# Patient Record
Sex: FEMALE | Race: WHITE | NOT HISPANIC OR LATINO | URBAN - METROPOLITAN AREA
[De-identification: names, ages, dates, MRNs, and addresses within clinical notes are randomized per-mention and may not be internally consistent; named-entity substitution may affect disease eponyms.]

---

## 2021-11-17 ENCOUNTER — INPATIENT (INPATIENT)
Facility: HOSPITAL | Age: 86
LOS: 4 days | Discharge: HOME | DRG: 308 | End: 2021-11-22
Attending: INTERNAL MEDICINE | Admitting: INTERNAL MEDICINE
Payer: MEDICARE

## 2021-11-17 VITALS
HEIGHT: 58 IN | RESPIRATION RATE: 18 BRPM | SYSTOLIC BLOOD PRESSURE: 128 MMHG | OXYGEN SATURATION: 96 % | TEMPERATURE: 99 F | DIASTOLIC BLOOD PRESSURE: 78 MMHG | WEIGHT: 110.01 LBS | HEART RATE: 127 BPM

## 2021-11-17 DIAGNOSIS — I10 ESSENTIAL (PRIMARY) HYPERTENSION: ICD-10-CM

## 2021-11-17 DIAGNOSIS — I50.23 ACUTE ON CHRONIC SYSTOLIC (CONGESTIVE) HEART FAILURE: ICD-10-CM

## 2021-11-17 DIAGNOSIS — K21.9 GASTRO-ESOPHAGEAL REFLUX DISEASE WITHOUT ESOPHAGITIS: ICD-10-CM

## 2021-11-17 DIAGNOSIS — F03.90 UNSPECIFIED DEMENTIA WITHOUT BEHAVIORAL DISTURBANCE: ICD-10-CM

## 2021-11-17 DIAGNOSIS — M19.90 UNSPECIFIED OSTEOARTHRITIS, UNSPECIFIED SITE: ICD-10-CM

## 2021-11-17 DIAGNOSIS — I50.9 HEART FAILURE, UNSPECIFIED: ICD-10-CM

## 2021-11-17 DIAGNOSIS — D50.9 IRON DEFICIENCY ANEMIA, UNSPECIFIED: ICD-10-CM

## 2021-11-17 DIAGNOSIS — I48.91 UNSPECIFIED ATRIAL FIBRILLATION: ICD-10-CM

## 2021-11-17 DIAGNOSIS — Z29.9 ENCOUNTER FOR PROPHYLACTIC MEASURES, UNSPECIFIED: ICD-10-CM

## 2021-11-17 DIAGNOSIS — Z90.49 ACQUIRED ABSENCE OF OTHER SPECIFIED PARTS OF DIGESTIVE TRACT: Chronic | ICD-10-CM

## 2021-11-17 LAB
ALBUMIN SERPL ELPH-MCNC: 3 G/DL — LOW (ref 3.3–5)
ALP SERPL-CCNC: 147 U/L — HIGH (ref 40–120)
ALT FLD-CCNC: 58 U/L — SIGNIFICANT CHANGE UP (ref 12–78)
ANION GAP SERPL CALC-SCNC: 12 MMOL/L — SIGNIFICANT CHANGE UP (ref 5–17)
APTT BLD: 31 SEC — SIGNIFICANT CHANGE UP (ref 27.5–35.5)
AST SERPL-CCNC: 24 U/L — SIGNIFICANT CHANGE UP (ref 15–37)
BASOPHILS # BLD AUTO: 0.02 K/UL — SIGNIFICANT CHANGE UP (ref 0–0.2)
BASOPHILS NFR BLD AUTO: 0.3 % — SIGNIFICANT CHANGE UP (ref 0–2)
BILIRUB SERPL-MCNC: 0.5 MG/DL — SIGNIFICANT CHANGE UP (ref 0.2–1.2)
BUN SERPL-MCNC: 25 MG/DL — HIGH (ref 7–23)
CALCIUM SERPL-MCNC: 8.8 MG/DL — SIGNIFICANT CHANGE UP (ref 8.5–10.1)
CHLORIDE SERPL-SCNC: 112 MMOL/L — HIGH (ref 96–108)
CK MB CFR SERPL CALC: 1.6 NG/ML — SIGNIFICANT CHANGE UP (ref 0–3.6)
CO2 SERPL-SCNC: 21 MMOL/L — LOW (ref 22–31)
CREAT SERPL-MCNC: 0.96 MG/DL — SIGNIFICANT CHANGE UP (ref 0.5–1.3)
EOSINOPHIL # BLD AUTO: 0.03 K/UL — SIGNIFICANT CHANGE UP (ref 0–0.5)
EOSINOPHIL NFR BLD AUTO: 0.4 % — SIGNIFICANT CHANGE UP (ref 0–6)
GLUCOSE SERPL-MCNC: 98 MG/DL — SIGNIFICANT CHANGE UP (ref 70–99)
HCT VFR BLD CALC: 36.5 % — SIGNIFICANT CHANGE UP (ref 34.5–45)
HGB BLD-MCNC: 12 G/DL — SIGNIFICANT CHANGE UP (ref 11.5–15.5)
IMM GRANULOCYTES NFR BLD AUTO: 0.3 % — SIGNIFICANT CHANGE UP (ref 0–1.5)
INR BLD: 1.13 RATIO — SIGNIFICANT CHANGE UP (ref 0.88–1.16)
LYMPHOCYTES # BLD AUTO: 1.34 K/UL — SIGNIFICANT CHANGE UP (ref 1–3.3)
LYMPHOCYTES # BLD AUTO: 17 % — SIGNIFICANT CHANGE UP (ref 13–44)
MCHC RBC-ENTMCNC: 27.6 PG — SIGNIFICANT CHANGE UP (ref 27–34)
MCHC RBC-ENTMCNC: 32.9 GM/DL — SIGNIFICANT CHANGE UP (ref 32–36)
MCV RBC AUTO: 84.1 FL — SIGNIFICANT CHANGE UP (ref 80–100)
MONOCYTES # BLD AUTO: 0.64 K/UL — SIGNIFICANT CHANGE UP (ref 0–0.9)
MONOCYTES NFR BLD AUTO: 8.1 % — SIGNIFICANT CHANGE UP (ref 2–14)
NEUTROPHILS # BLD AUTO: 5.82 K/UL — SIGNIFICANT CHANGE UP (ref 1.8–7.4)
NEUTROPHILS NFR BLD AUTO: 73.9 % — SIGNIFICANT CHANGE UP (ref 43–77)
NRBC # BLD: 0 /100 WBCS — SIGNIFICANT CHANGE UP (ref 0–0)
NT-PROBNP SERPL-SCNC: 4782 PG/ML — HIGH (ref 0–450)
PLATELET # BLD AUTO: 212 K/UL — SIGNIFICANT CHANGE UP (ref 150–400)
POTASSIUM SERPL-MCNC: 4.1 MMOL/L — SIGNIFICANT CHANGE UP (ref 3.5–5.3)
POTASSIUM SERPL-SCNC: 4.1 MMOL/L — SIGNIFICANT CHANGE UP (ref 3.5–5.3)
PROT SERPL-MCNC: 6.3 G/DL — SIGNIFICANT CHANGE UP (ref 6–8.3)
PROTHROM AB SERPL-ACNC: 13.1 SEC — SIGNIFICANT CHANGE UP (ref 10.6–13.6)
RBC # BLD: 4.34 M/UL — SIGNIFICANT CHANGE UP (ref 3.8–5.2)
RBC # FLD: 17.3 % — HIGH (ref 10.3–14.5)
SARS-COV-2 RNA SPEC QL NAA+PROBE: SIGNIFICANT CHANGE UP
SODIUM SERPL-SCNC: 145 MMOL/L — SIGNIFICANT CHANGE UP (ref 135–145)
TROPONIN I, HIGH SENSITIVITY RESULT: 20.2 NG/L — SIGNIFICANT CHANGE UP
WBC # BLD: 7.87 K/UL — SIGNIFICANT CHANGE UP (ref 3.8–10.5)
WBC # FLD AUTO: 7.87 K/UL — SIGNIFICANT CHANGE UP (ref 3.8–10.5)

## 2021-11-17 PROCEDURE — 71045 X-RAY EXAM CHEST 1 VIEW: CPT | Mod: 26

## 2021-11-17 PROCEDURE — 99285 EMERGENCY DEPT VISIT HI MDM: CPT

## 2021-11-17 PROCEDURE — 93010 ELECTROCARDIOGRAM REPORT: CPT | Mod: 77

## 2021-11-17 PROCEDURE — 99222 1ST HOSP IP/OBS MODERATE 55: CPT

## 2021-11-17 PROCEDURE — 93010 ELECTROCARDIOGRAM REPORT: CPT

## 2021-11-17 RX ORDER — METOPROLOL TARTRATE 50 MG
50 TABLET ORAL
Refills: 0 | Status: DISCONTINUED | OUTPATIENT
Start: 2021-11-17 | End: 2021-11-17

## 2021-11-17 RX ORDER — FUROSEMIDE 40 MG
40 TABLET ORAL ONCE
Refills: 0 | Status: COMPLETED | OUTPATIENT
Start: 2021-11-17 | End: 2021-11-17

## 2021-11-17 RX ORDER — FUROSEMIDE 40 MG
40 TABLET ORAL EVERY 12 HOURS
Refills: 0 | Status: DISCONTINUED | OUTPATIENT
Start: 2021-11-17 | End: 2021-11-19

## 2021-11-17 RX ORDER — RIVAROXABAN 15 MG-20MG
15 KIT ORAL DAILY
Refills: 0 | Status: DISCONTINUED | OUTPATIENT
Start: 2021-11-17 | End: 2021-11-22

## 2021-11-17 RX ORDER — METOPROLOL TARTRATE 50 MG
50 TABLET ORAL ONCE
Refills: 0 | Status: COMPLETED | OUTPATIENT
Start: 2021-11-17 | End: 2021-11-17

## 2021-11-17 RX ORDER — ERGOCALCIFEROL 1.25 MG/1
50000 CAPSULE ORAL
Refills: 0 | Status: DISCONTINUED | OUTPATIENT
Start: 2021-11-17 | End: 2021-11-22

## 2021-11-17 RX ORDER — DILTIAZEM HCL 120 MG
10 CAPSULE, EXT RELEASE 24 HR ORAL ONCE
Refills: 0 | Status: COMPLETED | OUTPATIENT
Start: 2021-11-17 | End: 2021-11-17

## 2021-11-17 RX ORDER — ACETAMINOPHEN 500 MG
650 TABLET ORAL EVERY 6 HOURS
Refills: 0 | Status: DISCONTINUED | OUTPATIENT
Start: 2021-11-17 | End: 2021-11-22

## 2021-11-17 RX ORDER — PANTOPRAZOLE SODIUM 20 MG/1
20 TABLET, DELAYED RELEASE ORAL DAILY
Refills: 0 | Status: DISCONTINUED | OUTPATIENT
Start: 2021-11-17 | End: 2021-11-22

## 2021-11-17 RX ORDER — FERROUS SULFATE 325(65) MG
325 TABLET ORAL DAILY
Refills: 0 | Status: DISCONTINUED | OUTPATIENT
Start: 2021-11-17 | End: 2021-11-22

## 2021-11-17 RX ORDER — METOPROLOL TARTRATE 50 MG
5 TABLET ORAL ONCE
Refills: 0 | Status: COMPLETED | OUTPATIENT
Start: 2021-11-17 | End: 2021-11-17

## 2021-11-17 RX ORDER — MEMANTINE HYDROCHLORIDE 10 MG/1
10 TABLET ORAL
Refills: 0 | Status: DISCONTINUED | OUTPATIENT
Start: 2021-11-17 | End: 2021-11-22

## 2021-11-17 RX ORDER — CHOLECALCIFEROL (VITAMIN D3) 125 MCG
1250 CAPSULE ORAL
Refills: 0 | Status: DISCONTINUED | OUTPATIENT
Start: 2021-11-17 | End: 2021-11-17

## 2021-11-17 RX ORDER — ZOLPIDEM TARTRATE 10 MG/1
5 TABLET ORAL AT BEDTIME
Refills: 0 | Status: DISCONTINUED | OUTPATIENT
Start: 2021-11-17 | End: 2021-11-18

## 2021-11-17 RX ORDER — FUROSEMIDE 40 MG
40 TABLET ORAL DAILY
Refills: 0 | Status: DISCONTINUED | OUTPATIENT
Start: 2021-11-17 | End: 2021-11-17

## 2021-11-17 RX ORDER — INFLUENZA VIRUS VACCINE 15; 15; 15; 15 UG/.5ML; UG/.5ML; UG/.5ML; UG/.5ML
0.7 SUSPENSION INTRAMUSCULAR ONCE
Refills: 0 | Status: COMPLETED | OUTPATIENT
Start: 2021-11-17 | End: 2021-11-22

## 2021-11-17 RX ORDER — LANOLIN ALCOHOL/MO/W.PET/CERES
3 CREAM (GRAM) TOPICAL AT BEDTIME
Refills: 0 | Status: DISCONTINUED | OUTPATIENT
Start: 2021-11-17 | End: 2021-11-17

## 2021-11-17 RX ORDER — MEMANTINE HYDROCHLORIDE 10 MG/1
28 TABLET ORAL DAILY
Refills: 0 | Status: DISCONTINUED | OUTPATIENT
Start: 2021-11-17 | End: 2021-11-17

## 2021-11-17 RX ORDER — METOPROLOL TARTRATE 50 MG
50 TABLET ORAL EVERY 8 HOURS
Refills: 0 | Status: DISCONTINUED | OUTPATIENT
Start: 2021-11-17 | End: 2021-11-22

## 2021-11-17 RX ORDER — DILTIAZEM HCL 120 MG
10 CAPSULE, EXT RELEASE 24 HR ORAL
Qty: 125 | Refills: 0 | Status: DISCONTINUED | OUTPATIENT
Start: 2021-11-17 | End: 2021-11-17

## 2021-11-17 RX ORDER — DILTIAZEM HCL 120 MG
5 CAPSULE, EXT RELEASE 24 HR ORAL
Qty: 125 | Refills: 0 | Status: DISCONTINUED | OUTPATIENT
Start: 2021-11-17 | End: 2021-11-17

## 2021-11-17 RX ORDER — METOPROLOL TARTRATE 50 MG
50 TABLET ORAL EVERY 8 HOURS
Refills: 0 | Status: DISCONTINUED | OUTPATIENT
Start: 2021-11-17 | End: 2021-11-17

## 2021-11-17 RX ADMIN — Medication 50 MILLIGRAM(S): at 18:23

## 2021-11-17 RX ADMIN — Medication 10 MILLIGRAM(S): at 20:22

## 2021-11-17 RX ADMIN — MEMANTINE HYDROCHLORIDE 10 MILLIGRAM(S): 10 TABLET ORAL at 18:24

## 2021-11-17 RX ADMIN — Medication 50 MILLIGRAM(S): at 23:09

## 2021-11-17 RX ADMIN — Medication 10 MG/HR: at 19:40

## 2021-11-17 RX ADMIN — Medication 40 MILLIGRAM(S): at 18:24

## 2021-11-17 RX ADMIN — Medication 40 MILLIGRAM(S): at 11:55

## 2021-11-17 RX ADMIN — Medication 5 MILLIGRAM(S): at 13:53

## 2021-11-17 NOTE — H&P ADULT - NEUROLOGICAL DETAILS
alert and oriented x 3/responds to verbal commands/sensation intact/normal strength alert and oriented x 3/responds to verbal commands/sensation intact/cranial nerves intact/normal strength

## 2021-11-17 NOTE — H&P ADULT - NSICDXPASTMEDICALHX_GEN_ALL_CORE_FT
PAST MEDICAL HISTORY:  CHF, chronic     Chronic atrial fibrillation     Dementia     GERD (gastroesophageal reflux disease)     Hypertension     Iron deficiency anemia     Osteoarthritis

## 2021-11-17 NOTE — CHART NOTE - NSCHARTNOTEFT_GEN_A_CORE
Called by Rn for pts HRs in afib to 60's to 80s with 3 second pause. STAT EKG obtained consistent with afib at 75 bpm. Bp 105/62. Will d/c cardizem gtt for now, give PO metoprolol as ordered. Continue tele monitoring, if HRs increase will consider restarting cardizem gtt. Discussed with Dr. Cole Called by Rn for pts HRs in afib to 60's to 80s with 3 second pause. STAT EKG obtained consistent with afib at 75 bpm. Bp 105/62. Will d/c cardizem gtt for now, give PO metoprolol as ordered. Continue tele monitoring, if HRs increase will consider restarting cardizem gtt. Discussed with Dr. Cole    Addendum:   Tele strip examined, pt still in afib with HRs in 100's to 120's. Blood pressure 132/69. Will restart cardizem gtt at 5 cc/hr. Continue to monitor on tele, increase as needed, wean as tolerated. Monitor hemodynamics closely. Rn to notify of changes. Called by Rn for pts HRs in afib to 60's to 80s with 3 second pause. STAT EKG obtained consistent with afib at 75 bpm. Bp 105/62. Will d/c cardizem gtt for now, give PO metoprolol as ordered. Continue tele monitoring, if HRs increase will consider restarting cardizem gtt. Discussed with Dr. Cole    Addendum 3:30 AM on 11/18/21:  Tele strip examined, pt still in afib with HRs in 100's to 120's. Blood pressure 132/69. Will restart cardizem gtt at 5 cc/hr. Continue to monitor on tele, increase as needed, wean as tolerated. Monitor hemodynamics closely. Rn to notify of changes.

## 2021-11-17 NOTE — ED PROVIDER NOTE - OBJECTIVE STATEMENT
Pt is a 89 yo female with pmhx of CHF pleural effusion afib HTN on O2 at home as needed here for worsening sob for last few days. Pt denies any cough fever nvd abdominal pain. Son in law states pt also having episodes of rapid afib. Pt is Angolan speaking daughter and daughter in law translating. Pt is vaccinated for covid. Pt states sob worse with exertion denies any leg swelling.    cardio [place] Pt is a 89 yo female with pmhx of CHF pleural effusion afib on Xarelto HTN on O2 at home as needed here for worsening sob for last few days. Pt denies any cough fever nvd abdominal pain. Son in law states pt also having episodes of rapid afib. Pt is Montenegrin speaking daughter and daughter in law translating. Pt is vaccinated for covid. Pt states sob worse with exertion denies any leg swelling.    cardio [place]

## 2021-11-17 NOTE — H&P ADULT - HISTORY OF PRESENT ILLNESS
Patient is a 89 year old female with PMH afib on Xarelto, CHF, HTN, GERD, osteoarthritis, anxiety, iron deficiency anemia who presents to the ER with complaint of SOB.         In the ED:   VS: T:99, HR: 127, BP: 128/78, RR;18, SpO2: 96% on room air   Labs were significant for Cl: 112, BUN: 25, alk phos: 147, proBNP: 4782  CXR: on personal read, pulmonary vascular congestion noted   EKG:   Patient received Lasix 40 mg IVP x 1  Patient is a 89 year old female with PMH afib on Xarelto, CHF, HTN, GERD, osteoarthritis, anxiety, iron deficiency anemia who presents to the ER with complaint of SOB. She has been feeling short of breath for a few months now, but her symptoms have worsened over the past week. She has been having shortness of breath with minimal exertion, and some orthopnea, but is able to tolerate lying flat. She denies any cough, congestion, fever, chills, chest pain, abd pain, diarrhea, nausea, or LE pain. She reports being chronically constipated, and is on multiple medications to help her have BMs. Since her symptoms were exacerbated, she has been noticing palpitations. She has been hospitalized for atrial fibrillation before in 2019 in [place]. She has no further complaints or concerns at this time.     In the ED:   VS: T:99, HR: 127, BP: 128/78, RR;18, SpO2: 96% on room air   Labs were significant for Cl: 112, BUN: 25, alk phos: 147, proBNP: 4782  CXR: on personal read, pulmonary vascular congestion noted   EKG: , AFib with RVR, LAD, nonspecific ST and T wave abnormality  Patient received Lasix 40 mg IVP x 1

## 2021-11-17 NOTE — H&P ADULT - PROBLEM SELECTOR PLAN 5
History of mild dementia  -Continue memantine History of mild dementia  -Continue memantine home dose

## 2021-11-17 NOTE — H&P ADULT - PROBLEM SELECTOR PLAN 1
Patient presenting with SOB for the past few days, likely secondary to acute on chronic CHF exacerbation   - Admit to telemetry   - Patient takes 20 mg Lasix daily, will start Lasix 40 mg IVP ---  - Continue beta blocker- metoprolol 50 mg BID   - Last TTE per patient was ----   - Obtain TTE   - Strict I&Os, daily weights   - Cardio (Dr Mukherjee) consulted; f/u recommendations Patient presenting with SOB for the past few days, likely secondary to acute on chronic CHF exacerbation   - Admit to telemetry   - Patient takes 20 mg Lasix daily, will start Lasix 40 mg IVP daily, f.u cardiology recommendations regarding diuresis  - Continue beta blocker- metoprolol 50 mg BID   - Last TTE per patient was ----   - Obtain TTE   - Strict I&Os, daily weights   - Cardio (Dr Mukherjee) consulted; f/u recommendations Patient presenting with SOB for the past few days, likely secondary to acute on chronic CHF exacerbation   - Admit to telemetry   - Patient takes 20 mg Lasix daily, will start Lasix 40 mg IVP daily, f.u cardiology recommendations regarding diuresis  - Continue beta blocker- metoprolol 50 mg BID --> change to q 8 hrs   - Last TTE per patient was   - Obtain TTE   - Strict I&Os, daily weights   - Cardio (Dr Mukherjee) consulted; f/u recommendations,

## 2021-11-17 NOTE — H&P ADULT - GASTROINTESTINAL DETAILS
normal/soft/nontender/no distention/bowel sounds normal/no bruit/no guarding/no rigidity normal/soft/nontender/no distention/no masses palpable/bowel sounds normal/no bruit/no rebound tenderness/no guarding/no rigidity/no organomegaly

## 2021-11-17 NOTE — H&P ADULT - NSHPSOCIALHISTORY_GEN_ALL_CORE
Pt lives alone at home, receives help from an aide a few times a week for a few hours at a time to help with IADLs.  Denies EtOH, tobacco, and recreational drug use.   Uses walker to ambulate at home.

## 2021-11-17 NOTE — ED ADULT NURSE NOTE - OBJECTIVE STATEMENT
Received p tin bed alert and oriented x4. C.O SOB and weakness x a few months.  Pt son in law explains that its been worsening the last 2 weeks.  pt son also explains that shes been in AFib for that time as well and has been having complaints of SOB as well.  EKG completed.  Pt on monitor.

## 2021-11-17 NOTE — CONSULT NOTE ADULT - SUBJECTIVE AND OBJECTIVE BOX
Date/Time Patient Seen:  		  Referring MD:   Data Reviewed	       Patient is a 89y old  Female who presents with a chief complaint of     Subjective/HPI  in bed  seen and examined  vs noted  labs reviewed  er provider note reviewed  cardio eval in progress  spoke with pt - son in law and daughter  pt lives with aide  sob and mayer  follows with Cardio team in [place]  lately more sob - known AF - more dyspnea and LE edema  no travel  retired  ros - sob  family hx - HTN    born in [place]    Subjective History of Illness:  · Chief Complaint: The patient is a 89y Female complaining of shortness of breath.  · HPI Objective Statement: Received p tin bed alert and oriented x4. C.O SOB and weakness x a few months.  Pt son in law explains that its been worsening the last 2 weeks.  pt son also explains that shes been in AFib for that time as well and has been having complaints of SOB as well.  EKG completed.  Pt on monitor.  · Presenting Symptoms: PALPITATIONS, SHORTNESS OF BREATH  · Negative Findings: no back pain  · Timing: gradual onset  · Duration: month(s)  · Context: unknown  · Aggravated Factors: none  · Relieving Factors: none       PAST MEDICAL & SURGICAL HISTORY:        Medication list         MEDICATIONS  (STANDING):    MEDICATIONS  (PRN):         Vitals log        ICU Vital Signs Last 24 Hrs  T(C): 37.1 (17 Nov 2021 11:09), Max: 37.2 (17 Nov 2021 10:41)  T(F): 98.7 (17 Nov 2021 11:09), Max: 99 (17 Nov 2021 10:41)  HR: 120 (17 Nov 2021 11:52) (120 - 135)  BP: 131/70 (17 Nov 2021 11:52) (128/78 - 131/70)  BP(mean): --  ABP: --  ABP(mean): --  RR: 16 (17 Nov 2021 11:52) (16 - 18)  SpO2: 96% (17 Nov 2021 11:52) (96% - 96%)           Input and Output:  I&O's Detail      Lab Data                        12.0   7.87  )-----------( 212      ( 17 Nov 2021 11:31 )             36.5     11-17    145  |  112<H>  |  25<H>  ----------------------------<  98  4.1   |  21<L>  |  0.96    Ca    8.8      17 Nov 2021 11:31    TPro  6.3  /  Alb  3.0<L>  /  TBili  0.5  /  DBili  x   /  AST  24  /  ALT  58  /  AlkPhos  147<H>  11-17      CARDIAC MARKERS ( 17 Nov 2021 11:31 )  x     / x     / x     / x     / 1.6 ng/mL        Review of Systems	  hard of hearing  verbal  alert  sob  mayer  weakness  LE edema      Objective     Physical Examination    heart s1s2  lung dec BS  abd soft  head nc  extr edema  cn grossly int      Pertinent Lab findings & Imaging      Lynn:  NO   Adequate UO     I&O's Detail           Discussed with:     Cultures:	        Radiology          cxr  eff  atelectasis  cardiomegaly - vs pericardial eff -                   
  CHIEF COMPLAINT: Patient is a 89y old  Female who presents with a chief complaint of CHF exacerbation (17 Nov 2021 13:14)      HPI:  Patient is a 89 year old female with PMH afib on Xarelto, CHF, HTN, GERD, osteoarthritis, anxiety, iron deficiency anemia who presents to the ER with complaint of SOB. She has been feeling short of breath for a few months now, but her symptoms have worsened over the past week. She has been having shortness of breath with minimal exertion, and some orthopnea, but is able to tolerate lying flat. She denies any cough, congestion, fever, chills, chest pain, abd pain, diarrhea, nausea, or LE pain. She reports being chronically constipated, and is on multiple medications to help her have BMs. Since her symptoms were exacerbated, she has been noticing palpitations. She has been hospitalized for atrial fibrillation before in 2019 in [place]. She has no further complaints or concerns at this time.     In the ED:   VS: T:99, HR: 127, BP: 128/78, RR;18, SpO2: 96% on room air   Labs were significant for Cl: 112, BUN: 25, alk phos: 147, proBNP: 4782  CXR: on personal read, pulmonary vascular congestion noted   EKG: , AFib with RVR, LAD, nonspecific ST and T wave abnormality      REVIEW OF SYSTEMS:   All other review of systems are negative unless indicated above    PAST MEDICAL & SURGICAL HISTORY:  Chronic atrial fibrillation    CHF, chronic    Hypertension    Osteoarthritis    Dementia    GERD (gastroesophageal reflux disease)    Iron deficiency anemia    History of cholecystectomy        SOCIAL HISTORY:  No tobacco, ethanol, or drug abuse.    FAMILY HISTORY:  Family history of breast cancer (Mother)  FH: diabetes mellitus (Father)  no family history of acute MI or sudden cardiac death.    MEDICATIONS  (STANDING):  ergocalciferol 04322 Unit(s) Oral <User Schedule>  ferrous    sulfate 325 milliGRAM(s) Oral daily  furosemide   Injectable 40 milliGRAM(s) IV Push daily  memantine 28 milliGRAM(s) Oral daily  metoprolol tartrate 50 milliGRAM(s) Oral two times a day  pantoprazole    Tablet 20 milliGRAM(s) Oral daily  rivaroxaban 15 milliGRAM(s) Oral daily    MEDICATIONS  (PRN):  acetaminophen     Tablet .. 650 milliGRAM(s) Oral every 6 hours PRN Temp greater or equal to 38C (100.4F), Mild Pain (1 - 3)  melatonin 3 milliGRAM(s) Oral at bedtime PRN Insomnia      Allergies    No Known Allergies    Intolerances        Home meds:  Home Medications:  Ferrex 150 Plus oral capsule: 1 cap(s) orally once a day (17 Nov 2021 13:16)  furosemide 20 mg oral tablet: 1 tab(s) orally once a day (17 Nov 2021 13:16)  memantine 28 mg oral capsule, extended release: 1 cap(s) orally once a day (17 Nov 2021 13:16)  Metoprolol Tartrate 25 mg oral tablet: 2 tab(s) orally 2 times a day (17 Nov 2021 13:16)  pantoprazole 20 mg oral delayed release tablet: 1 tab(s) orally once a day (17 Nov 2021 13:16)  Vitamin D3 1250 mcg (50,000 intl units) oral capsule: 1 cap(s) orally once a week on Monday  (17 Nov 2021 13:16)  Xarelto 15 mg oral tablet: 1 tab(s) orally once a day (in the evening) (17 Nov 2021 13:16)  zolpidem 10 mg oral tablet: 1 tab(s) orally once a day (at bedtime), As Needed (17 Nov 2021 13:16)        VITAL SIGNS:   Vital Signs Last 24 Hrs  T(C): 37.1 (17 Nov 2021 11:09), Max: 37.2 (17 Nov 2021 10:41)  T(F): 98.7 (17 Nov 2021 11:09), Max: 99 (17 Nov 2021 10:41)  HR: 112 (17 Nov 2021 13:50) (112 - 135)  BP: 124/78 (17 Nov 2021 13:50) (124/78 - 131/70)  BP(mean): --  RR: 17 (17 Nov 2021 13:50) (16 - 18)  SpO2: 95% (17 Nov 2021 13:50) (95% - 96%)    I&O's Summary      On Exam:  TELE: AF w RVR   Constitutional: NAD, awake and alert, well-developed  HEENT: Moist Mucous Membranes, Anicteric, + JVP  Pulmonary: Decreased breath sounds b/l. bibasilar crackles   Cardiovascular: IRRR tachy, , S1 and S2, 2/6 SM  Gastrointestinal: Bowel Sounds present, soft, nontender.   Lymph: + peripheral edema. No lymphadenopathy.  Skin: No visible rashes or ulcers.  Psych:  Mood & affect appropriate    LABS: All Labs Reviewed:                        12.0   7.87  )-----------( 212      ( 17 Nov 2021 11:31 )             36.5     17 Nov 2021 11:31    145    |  112    |  25     ----------------------------<  98     4.1     |  21     |  0.96     Ca    8.8        17 Nov 2021 11:31    TPro  6.3    /  Alb  3.0    /  TBili  0.5    /  DBili  x      /  AST  24     /  ALT  58     /  AlkPhos  147    17 Nov 2021 11:31    PT/INR - ( 17 Nov 2021 11:31 )   PT: 13.1 sec;   INR: 1.13 ratio         PTT - ( 17 Nov 2021 11:31 )  PTT:31.0 sec  CARDIAC MARKERS ( 17 Nov 2021 11:31 )  x     / x     / x     / x     / 1.6 ng/mL      Blood Culture:   11-17 @ 11:31  Pro Bnp 4782        RADIOLOGY:

## 2021-11-17 NOTE — ED PROVIDER NOTE - CONSTITUTIONAL, MLM
Well appearing, awake, alert, oriented to person, place, time/situation + mild tachypnea, pt on O2 normal...

## 2021-11-17 NOTE — CONSULT NOTE ADULT - ASSESSMENT
89 year old female with PMH afib on Xarelto, CHF, HTN, GERD, osteoarthritis, anxiety, iron deficiency anemia presents with decompensated heart failure    - She is in acute decompensated HF.   - cont supplemental O2 as necessary  - BNP is elevated  - Lasix 40mg IV q12  - Please continue to maintain strict I/Os, monitor daily weights, Cr, and K.     - Has a mumur of ?mod/mod-severe AS  - check echo    - no signs of ischemia. Hs trop is neg. Doubt 2/2 ACS    - AF is uncontrolled  - this is likely primarily driven by her ADHF  - cont metoprolol 50mg q12   - was on dig but stopped 2/2 elevated level. check dig level. would resume QOD if needed  - monitor tele  - cont Xarelto     - Monitor and replete electrolytes. Keep K>4.0 and Mg>2.0.  - Further cardiac workup will depend on clinical course.   - All other workup per primary team. Will followup. 
pt with AF - HTN - OP - OA - Hard of Hearing - HF - valvular heart disease - on AC - now with LE edema and more sob - mayer -     cardio eval in progress - Judicious Diuresis - I and O - cvs rx regimen and BP control - rate control for AF - TTE -     CXR reviewed    monitor VS and HD and Sat    GOC discussion    dietary discretion    proBNP consistent with vol overload - HF -     spoke with Daughter and Son in Law

## 2021-11-17 NOTE — H&P ADULT - ASSESSMENT
Patient is a 89 year old female with PMH afib on Xarelto, CHF, HTN, GERD, osteoarthritis, anxiety, iron deficiency anemia who presents to the ER with complaint of SOB, admitted for CHF exacerbation Patient is a 89 year old female with PMH afib on Xarelto, CHF, HTN, GERD, osteoarthritis, anxiety, iron deficiency anemia who presents to the ER with complaint of SOB, admitted for  Ac on chronic CHF exacerbation

## 2021-11-17 NOTE — H&P ADULT - RS GEN PE MLT RESP DETAILS PC
airway patent/breath sounds equal/good air movement/rales airway patent/breath sounds equal/good air movement/no rales/no rhonchi/no wheezes/rales

## 2021-11-17 NOTE — ED ADULT NURSE REASSESSMENT NOTE - NS ED NURSE REASSESS COMMENT FT1
dr flores contacted about HR.  50 lopressor ti be given as per orders.
 to pt bedside HR noted 160s in AFIB. Diltiazem drip 10mg/hr started.   Will monitor rate before bringing patient to floor.

## 2021-11-17 NOTE — ED PROVIDER NOTE - ATTENDING CONTRIBUTION TO CARE
89 yo F p/w worsening dyspnea over past several days. Pt with hx chronic dyspnea over past couple months, now worse. no le edema. no chest pain. no fever/chills. no cough / uri. Pt fully vaccinated for covid, no known exposures. no recent travel / sick contacts. no agg/allev factors. no other acute co.  exam; MM MOist. neck supple. no meningeal signs. abd soft NT. no hsm. no cvat. no c/c/e. no calf tend. lungs with course bs, lower lungs bl, no acc muscle use. Pos rapid , irreg HR. no other acute findings.  check labs, xr, cardio, pulm, TBA

## 2021-11-17 NOTE — H&P ADULT - MUSCULOSKELETAL
normal/ROM intact/no calf tenderness details… detailed exam normal/ROM intact/no joint erythema/no calf tenderness

## 2021-11-17 NOTE — ED PROVIDER NOTE - CLINICAL SUMMARY MEDICAL DECISION MAKING FREE TEXT BOX
Pt is a 89 yo female with sob will get ekg labs cxr cardio consult will require admission lasix 40 mg IV

## 2021-11-17 NOTE — H&P ADULT - PROBLEM SELECTOR PLAN 3
Chronic, stable   - Continue metoprolol 50 mg BID with hold parameters Chronic, stable   - Continue metoprolol 50 mg BID with hold parameters  - Monitor routine hemodynamics Chronic, stable   - Continue metoprolol 50 mg q 8 hrs with hold parameters  - Monitor routine hemodynamics Chronic, stable -IV Cardizem drip  - Continue metoprolol 50 mg q 8 hrs with hold parameters  - Monitor routine hemodynamics

## 2021-11-17 NOTE — H&P ADULT - NSICDXFAMILYHX_GEN_ALL_CORE_FT
FAMILY HISTORY:  Father  Still living? No  FH: diabetes mellitus, Age at diagnosis: Age Unknown    Mother  Still living? No  Family history of breast cancer, Age at diagnosis: Age Unknown

## 2021-11-17 NOTE — H&P ADULT - ATTENDING COMMENTS
Patient is a 89 year old female with PMH afib on Xarelto, CHF, HTN, GERD, osteoarthritis, anxiety, iron deficiency anemia who presents to the ER with complaint of SOB, admitted for CHF exacerbation.  Pt seen, examined, case & care plan d/w pt, resident at detail.  Care plan d/w Dr MARIELY Mukherjee Cardiology-start IV Cardizem drip for HR control,   AM Labs   PO diet   ECHO  D/W DR [facility],  Palliative Care eval for GOC.  PT Eval.

## 2021-11-17 NOTE — H&P ADULT - PROBLEM SELECTOR PLAN 2
History of afib on Xarelto  - History of afib on Xarelto  - Continue home dose of Xarelto 15 mg daily  - Given 5 mg Lopressor IVP  - F/u cardiology recs regarding rate control strategy History of afib on Xarelto  - Continue home dose of Xarelto 15 mg daily  - Given 5 mg Lopressor IVP  - F/u cardiology recs regarding rate control strategy  - Pt will be admitted to telemetry for cardiac monitoring History of afib on Xarelto  - Continue home dose of Xarelto 15 mg daily  - Given 5 mg Lopressor IVP x 1 dose.  -On Lopressor 50 mg 1 tab q 8 hrs   - F/u cardiology recs regarding rate control strategy  - Pt will be admitted to telemetry for cardiac monitoring- A Fib with RVR -start IV Cardizem Drip   ECHO, TFT's History of afib on Xarelto  A Fib with RVR   - Continue home dose of Xarelto 15 mg daily  - Given 5 mg Lopressor IVP x 1 dose.  -On Lopressor 50 mg 1 tab q 8 hrs   - F/u cardiology recs regarding rate control strategy  - Pt will be admitted to telemetry for cardiac monitoring- A Fib with RVR -start IV Cardizem Drip   ECHO, TFT's

## 2021-11-18 LAB
ALBUMIN SERPL ELPH-MCNC: 3.1 G/DL — LOW (ref 3.3–5)
ALP SERPL-CCNC: 141 U/L — HIGH (ref 40–120)
ALT FLD-CCNC: 50 U/L — SIGNIFICANT CHANGE UP (ref 12–78)
ANION GAP SERPL CALC-SCNC: 10 MMOL/L — SIGNIFICANT CHANGE UP (ref 5–17)
AST SERPL-CCNC: 22 U/L — SIGNIFICANT CHANGE UP (ref 15–37)
BASOPHILS # BLD AUTO: 0.02 K/UL — SIGNIFICANT CHANGE UP (ref 0–0.2)
BASOPHILS NFR BLD AUTO: 0.4 % — SIGNIFICANT CHANGE UP (ref 0–2)
BILIRUB SERPL-MCNC: 0.6 MG/DL — SIGNIFICANT CHANGE UP (ref 0.2–1.2)
BUN SERPL-MCNC: 20 MG/DL — SIGNIFICANT CHANGE UP (ref 7–23)
CALCIUM SERPL-MCNC: 8.6 MG/DL — SIGNIFICANT CHANGE UP (ref 8.5–10.1)
CHLORIDE SERPL-SCNC: 106 MMOL/L — SIGNIFICANT CHANGE UP (ref 96–108)
CO2 SERPL-SCNC: 29 MMOL/L — SIGNIFICANT CHANGE UP (ref 22–31)
COVID-19 NUCLEOCAPSID GAM AB INTERP: NEGATIVE — SIGNIFICANT CHANGE UP
COVID-19 NUCLEOCAPSID TOTAL GAM ANTIBODY RESULT: 0.08 INDEX — SIGNIFICANT CHANGE UP
COVID-19 SPIKE DOMAIN AB INTERP: POSITIVE
COVID-19 SPIKE DOMAIN ANTIBODY RESULT: >250 U/ML — HIGH
CREAT SERPL-MCNC: 0.91 MG/DL — SIGNIFICANT CHANGE UP (ref 0.5–1.3)
DIGOXIN SERPL-MCNC: 0.2 NG/ML — LOW (ref 0.8–2)
EOSINOPHIL # BLD AUTO: 0.12 K/UL — SIGNIFICANT CHANGE UP (ref 0–0.5)
EOSINOPHIL NFR BLD AUTO: 2.2 % — SIGNIFICANT CHANGE UP (ref 0–6)
GLUCOSE SERPL-MCNC: 98 MG/DL — SIGNIFICANT CHANGE UP (ref 70–99)
HCT VFR BLD CALC: 38.1 % — SIGNIFICANT CHANGE UP (ref 34.5–45)
HGB BLD-MCNC: 12.1 G/DL — SIGNIFICANT CHANGE UP (ref 11.5–15.5)
IMM GRANULOCYTES NFR BLD AUTO: 0.2 % — SIGNIFICANT CHANGE UP (ref 0–1.5)
LYMPHOCYTES # BLD AUTO: 0.98 K/UL — LOW (ref 1–3.3)
LYMPHOCYTES # BLD AUTO: 17.9 % — SIGNIFICANT CHANGE UP (ref 13–44)
MCHC RBC-ENTMCNC: 26.8 PG — LOW (ref 27–34)
MCHC RBC-ENTMCNC: 31.8 GM/DL — LOW (ref 32–36)
MCV RBC AUTO: 84.3 FL — SIGNIFICANT CHANGE UP (ref 80–100)
MONOCYTES # BLD AUTO: 0.61 K/UL — SIGNIFICANT CHANGE UP (ref 0–0.9)
MONOCYTES NFR BLD AUTO: 11.1 % — SIGNIFICANT CHANGE UP (ref 2–14)
NEUTROPHILS # BLD AUTO: 3.74 K/UL — SIGNIFICANT CHANGE UP (ref 1.8–7.4)
NEUTROPHILS NFR BLD AUTO: 68.2 % — SIGNIFICANT CHANGE UP (ref 43–77)
NRBC # BLD: 0 /100 WBCS — SIGNIFICANT CHANGE UP (ref 0–0)
PLATELET # BLD AUTO: 211 K/UL — SIGNIFICANT CHANGE UP (ref 150–400)
POTASSIUM SERPL-MCNC: 3.1 MMOL/L — LOW (ref 3.5–5.3)
POTASSIUM SERPL-SCNC: 3.1 MMOL/L — LOW (ref 3.5–5.3)
PROT SERPL-MCNC: 6.4 G/DL — SIGNIFICANT CHANGE UP (ref 6–8.3)
RBC # BLD: 4.52 M/UL — SIGNIFICANT CHANGE UP (ref 3.8–5.2)
RBC # FLD: 17 % — HIGH (ref 10.3–14.5)
SARS-COV-2 IGG+IGM SERPL QL IA: 0.08 INDEX — SIGNIFICANT CHANGE UP
SARS-COV-2 IGG+IGM SERPL QL IA: >250 U/ML — HIGH
SARS-COV-2 IGG+IGM SERPL QL IA: NEGATIVE — SIGNIFICANT CHANGE UP
SARS-COV-2 IGG+IGM SERPL QL IA: POSITIVE
SODIUM SERPL-SCNC: 145 MMOL/L — SIGNIFICANT CHANGE UP (ref 135–145)
WBC # BLD: 5.48 K/UL — SIGNIFICANT CHANGE UP (ref 3.8–10.5)
WBC # FLD AUTO: 5.48 K/UL — SIGNIFICANT CHANGE UP (ref 3.8–10.5)

## 2021-11-18 PROCEDURE — 99232 SBSQ HOSP IP/OBS MODERATE 35: CPT

## 2021-11-18 PROCEDURE — 93306 TTE W/DOPPLER COMPLETE: CPT | Mod: 26

## 2021-11-18 RX ORDER — DILTIAZEM HCL 120 MG
5 CAPSULE, EXT RELEASE 24 HR ORAL
Qty: 125 | Refills: 0 | Status: DISCONTINUED | OUTPATIENT
Start: 2021-11-18 | End: 2021-11-19

## 2021-11-18 RX ORDER — ZOLPIDEM TARTRATE 10 MG/1
10 TABLET ORAL AT BEDTIME
Refills: 0 | Status: DISCONTINUED | OUTPATIENT
Start: 2021-11-18 | End: 2021-11-22

## 2021-11-18 RX ORDER — POTASSIUM CHLORIDE 20 MEQ
40 PACKET (EA) ORAL ONCE
Refills: 0 | Status: COMPLETED | OUTPATIENT
Start: 2021-11-18 | End: 2021-11-18

## 2021-11-18 RX ORDER — SENNA PLUS 8.6 MG/1
2 TABLET ORAL AT BEDTIME
Refills: 0 | Status: DISCONTINUED | OUTPATIENT
Start: 2021-11-18 | End: 2021-11-22

## 2021-11-18 RX ORDER — POTASSIUM CHLORIDE 20 MEQ
40 PACKET (EA) ORAL EVERY 4 HOURS
Refills: 0 | Status: DISCONTINUED | OUTPATIENT
Start: 2021-11-18 | End: 2021-11-18

## 2021-11-18 RX ORDER — DILTIAZEM HCL 120 MG
30 CAPSULE, EXT RELEASE 24 HR ORAL EVERY 6 HOURS
Refills: 0 | Status: DISCONTINUED | OUTPATIENT
Start: 2021-11-18 | End: 2021-11-19

## 2021-11-18 RX ORDER — DILTIAZEM HCL 120 MG
5 CAPSULE, EXT RELEASE 24 HR ORAL
Qty: 125 | Refills: 0 | Status: DISCONTINUED | OUTPATIENT
Start: 2021-11-18 | End: 2021-11-18

## 2021-11-18 RX ADMIN — Medication 30 MILLIGRAM(S): at 23:05

## 2021-11-18 RX ADMIN — MEMANTINE HYDROCHLORIDE 10 MILLIGRAM(S): 10 TABLET ORAL at 05:27

## 2021-11-18 RX ADMIN — Medication 40 MILLIEQUIVALENT(S): at 13:10

## 2021-11-18 RX ADMIN — Medication 30 MILLIGRAM(S): at 13:10

## 2021-11-18 RX ADMIN — Medication 50 MILLIGRAM(S): at 13:10

## 2021-11-18 RX ADMIN — Medication 5 MG/HR: at 05:28

## 2021-11-18 RX ADMIN — Medication 50 MILLIGRAM(S): at 21:05

## 2021-11-18 RX ADMIN — Medication 5 MG/HR: at 10:39

## 2021-11-18 RX ADMIN — MEMANTINE HYDROCHLORIDE 10 MILLIGRAM(S): 10 TABLET ORAL at 17:01

## 2021-11-18 RX ADMIN — ZOLPIDEM TARTRATE 10 MILLIGRAM(S): 10 TABLET ORAL at 21:05

## 2021-11-18 RX ADMIN — Medication 325 MILLIGRAM(S): at 10:11

## 2021-11-18 RX ADMIN — Medication 40 MILLIGRAM(S): at 05:27

## 2021-11-18 RX ADMIN — Medication 50 MILLIGRAM(S): at 05:27

## 2021-11-18 RX ADMIN — Medication 30 MILLIGRAM(S): at 17:01

## 2021-11-18 RX ADMIN — SENNA PLUS 2 TABLET(S): 8.6 TABLET ORAL at 21:05

## 2021-11-18 RX ADMIN — PANTOPRAZOLE SODIUM 20 MILLIGRAM(S): 20 TABLET, DELAYED RELEASE ORAL at 10:11

## 2021-11-18 RX ADMIN — Medication 40 MILLIEQUIVALENT(S): at 10:11

## 2021-11-18 RX ADMIN — RIVAROXABAN 15 MILLIGRAM(S): KIT at 10:11

## 2021-11-18 NOTE — PROGRESS NOTE ADULT - ASSESSMENT
pt with AF - HTN - OP - OA - Hard of Hearing - HF - valvular heart disease - on AC - now with LE edema and more sob - mayer -     overnight events noted - vs noted - on LASIX - on Cardizem -     cardio eval in progress - Judicious Diuresis - I and O - cvs rx regimen and BP control - rate control for AF - TTE -     CXR reviewed - eff - atelectasis -     monitor VS and HD and Sat    GOC discussion    dietary discretion    proBNP consistent with vol overload - HF -     spoke with Daughter and Son in Law

## 2021-11-18 NOTE — PROGRESS NOTE ADULT - SUBJECTIVE AND OBJECTIVE BOX
Patient is a 89y old  Female who presents with a chief complaint of CHF exacerbation (18 Nov 2021 06:02)    HPI: Patient is a 89 year old female with PMH afib on Xarelto, CHF, HTN, GERD, osteoarthritis, anxiety, iron deficiency anemia who presents to the ER with complaint of SOB. She has been feeling short of breath for a few months now, but her symptoms have worsened over the past week. She has been having shortness of breath with minimal exertion, and some orthopnea, but is able to tolerate lying flat. She denies any cough, congestion, fever, chills, chest pain, abd pain, diarrhea, nausea, or LE pain. She reports being chronically constipated, and is on multiple medications to help her have BMs. Since her symptoms were exacerbated, she has been noticing palpitations. She has been hospitalized for atrial fibrillation before in 2019 in [place]. She has no further complaints or concerns at this time.     In the ED:   VS: T:99, HR: 127, BP: 128/78, RR;18, SpO2: 96% on room air   Labs were significant for Cl: 112, BUN: 25, alk phos: 147, proBNP: 4782  CXR: on personal read, pulmonary vascular congestion noted   EKG: , AFib with RVR, LAD, nonspecific ST and T wave abnormality  Patient received Lasix 40 mg IVP x 1    INTERVAL HPI:  (11/18/21)- Pt seen and examined at bedside this AM. She reports improvement in her difficulty breathing and her palpitations. She appears more comfortable today as well. She has no pain, and otherwise has no complaints or concerns.     OVERNIGHT EVENTS: Overnight, Cardizem drip was held for low HRs and sinus pause of around 3 seconds. It was then restarted at 5mg/hr due to elevated heart rates.     Home Medications:  Ferrex 150 Plus oral capsule: 1 cap(s) orally once a day (17 Nov 2021 13:16)  furosemide 20 mg oral tablet: 1 tab(s) orally once a day (17 Nov 2021 13:16)  memantine 28 mg oral capsule, extended release: 1 cap(s) orally once a day (17 Nov 2021 13:16)  Metoprolol Tartrate 25 mg oral tablet: 2 tab(s) orally 2 times a day (17 Nov 2021 13:16)  pantoprazole 20 mg oral delayed release tablet: 1 tab(s) orally once a day (17 Nov 2021 13:16)  Vitamin D3 1250 mcg (50,000 intl units) oral capsule: 1 cap(s) orally once a week on Monday  (17 Nov 2021 13:16)  Xarelto 15 mg oral tablet: 1 tab(s) orally once a day (in the evening) (17 Nov 2021 13:16)  zolpidem 10 mg oral tablet: 1 tab(s) orally once a day (at bedtime), As Needed (17 Nov 2021 13:16)    MEDICATIONS  (STANDING):  diltiazem Infusion 5 mG/Hr (5 mL/Hr) IV Continuous <Continuous>  ergocalciferol 77714 Unit(s) Oral <User Schedule>  ferrous    sulfate 325 milliGRAM(s) Oral daily  furosemide   Injectable 40 milliGRAM(s) IV Push every 12 hours  influenza  Vaccine (HIGH DOSE) 0.7 milliLiter(s) IntraMuscular once  memantine 10 milliGRAM(s) Oral two times a day  metoprolol tartrate 50 milliGRAM(s) Oral every 8 hours  pantoprazole    Tablet 20 milliGRAM(s) Oral daily  potassium chloride    Tablet ER 40 milliEquivalent(s) Oral every 4 hours  rivaroxaban 15 milliGRAM(s) Oral daily    MEDICATIONS  (PRN):  acetaminophen     Tablet .. 650 milliGRAM(s) Oral every 6 hours PRN Temp greater or equal to 38C (100.4F), Mild Pain (1 - 3)  zolpidem 5 milliGRAM(s) Oral at bedtime PRN Insomnia    No Known Allergies    Social History:  Pt lives alone at home, receives help from an aide a few times a week for a few hours at a time to help with IADLs.  Denies EtOH, tobacco, and recreational drug use.   Uses walker to ambulate at home. (17 Nov 2021 13:14)    REVIEW OF SYSTEMS:  CONSTITUTIONAL: No fever, No chills, No Body ache, No Weakness  EYES: No eye pain, No visual disturbances, No discharge, No Redness  ENMT: No sinus pain, No throat pain, No Congestion  NECK: No pain, No stiffness  RESPIRATORY: No cough, No wheezing, No shortness of breath  CARDIOVASCULAR: No chest pain, No palpitations  GASTROINTESTINAL: No abdominal pain, No nausea, No vomiting, No diarrhea, No constipation; [  ] BM  GENITOURINARY: No dysuria, No frequency, No urgency, No incontinence  NEUROLOGICAL: No headaches, No dizziness, No numbness  EXTREMITIES: No Swelling, No Pain, No Edema  SKIN: [ x ] No itching, burning, rashes  MUSCULOSKELETAL: No joint pain, No joint swelling; No muscle pain, No back pain, No extremity pain  PSYCHIATRIC: No depression, No anxiety, No difficulty sleeping at night  PAIN SCALE: [ x ] None  [  ] Other-  ROS Unable to obtain due to: [  ] Dementia  [  ] Lethargy  [  ] Sedated  [  ] Non verbal  REST OF REVIEW OF SYSTEMS: [ x ] Normal     Vital Signs Last 24 Hrs  T(C): 36.7 (18 Nov 2021 04:30), Max: 37.2 (17 Nov 2021 10:41)  T(F): 98 (18 Nov 2021 04:30), Max: 99 (17 Nov 2021 10:41)  HR: 78 (18 Nov 2021 04:30) (56 - 145)  BP: 101/66 (18 Nov 2021 04:30) (98/62 - 133/71)  RR: 18 (18 Nov 2021 04:30) (16 - 18)  SpO2: 94% (18 Nov 2021 04:30) (92% - 96%)    CAPILLARY BLOOD GLUCOSE    I&O's Summary    17 Nov 2021 07:01  -  18 Nov 2021 07:00  --------------------------------------------------------  IN: 240 mL / OUT: 0 mL / NET: 240 mL    PHYSICAL EXAM:  GENERAL:  [ x ] NAD, [ x ] Well appearing, [  ] Agitated, [  ] Drowsy, [  ] Lethargy, [  ] Confused   HEAD:  [ x ] Normal, [  ] Other  EYES:  [ x ] EOMI, [ x ] PERRLA, [ x ] Conjunctiva and sclera clear normal, [  ] Other, [  ] Pallor, [  ] Discharge  ENMT:  [ x ] Normal, [ x ] Moist mucous membranes, [  ] Good dentition, [ x ] No thrush  NECK:  [ x ] Supple, [ x ] No JVD, [  ] Normal thyroid, [  ] Lymphadenopathy, [  ] Other  CHEST/LUNG:  [  ] Clear to auscultation bilaterally, [  ] Breath Sounds equal B/L / decreased, [  ] Poor effort, [ x ] Rales up to mid-lung fields, [ x ] No rhonchi, [ x ] No wheezing  HEART:  [  ] Regular rate and rhythm, [  ] Tachycardia, [  ] Bradycardia, [ x ] Irregular, [ x ] No rubs, No gallops, [ ] Systolic murmur sternal border, [  ] PPM in place (Mfr:  )  ABDOMEN:  [ x ] Soft, [ x ] Nontender, [ x ] Nondistended, [  ] No mass, [ x ] Bowel sounds present, [  ] Obese  NERVOUS SYSTEM:  [ x ] Alert & Oriented x3, [ x ] Nonfocal, [  ] Confusion, [  ] Encephalopathic, [  ] Sedated, [  ] Unable to assess, [  ] Dementia, [  ] Other-  EXTREMITIES:  [ x ] 2+ Peripheral Pulses, No clubbing, No cyanosis,  [ x ] Edema B/L lower EXT, [  ] PVD stasis skin changes B/L lower EXT, [  ] Wound  LYMPH:  No lymphadenopathy noted  SKIN:  [ x ] No rashes or lesions, [  ] Pressure ulcers, [  ] Ecchymosis, [  ] Skin tears, [  ] Other    DIET: Diet, DASH/TLC:   Sodium & Cholesterol Restricted  1000mL Fluid Restriction ([id number]) (11-17-21 @ 16:02)    LABS:                        12.1   5.48  )-----------( 211      ( 18 Nov 2021 06:53 )             38.1     18 Nov 2021 06:53    145    |  106    |  20     ----------------------------<  98     3.1     |  29     |  0.91     Ca    8.6        18 Nov 2021 06:53    TPro  6.4    /  Alb  3.1    /  TBili  0.6    /  DBili  x      /  AST  22     /  ALT  50     /  AlkPhos  141    18 Nov 2021 06:53    PT/INR - ( 17 Nov 2021 11:31 )   PT: 13.1 sec;   INR: 1.13 ratio      PTT - ( 17 Nov 2021 11:31 )  PTT:31.0 sec    CARDIAC MARKERS ( 17 Nov 2021 11:31 )  x     / x     / x     / x     / 1.6 ng/mL     Anemia Panel:    Thyroid Panel:    Serum Pro-Brain Natriuretic Peptide: 4782 pg/mL (11-17-21 @ 11:31)    RADIOLOGY & ADDITIONAL TESTS:    HEALTH ISSUES - PROBLEM Dx:  Acute exacerbation of CHF (congestive heart failure)  Atrial fibrillation  Hypertension  GERD (gastroesophageal reflux disease)  Dementia  Iron deficiency anemia  Osteoarthritis  Need for prophylactic measure    Consultant(s) Notes Reviewed:  [ x ] YES     Care Discussed with [ x ] Consultants, [ x ] Patient, [  ] Family, [  ] HCP, [  ] , [  ] Social Service, [  ] RN, [  ] Physical Therapy, [  ] Palliative Care Team  DVT PPX: [  ] Lovenox, [  ] SC Heparin, [  ] Coumadin, [ x ] Xarelto, [  ] Eliquis, [  ] Pradaxa, [  ] IV Heparin drip, [  ] SCD, [  ] Ambulation, [  ] Contraindicated 2/2 GI Bleed, [  ] Contraindicated 2/2  Bleed, [  ] Contraindicated 2/2 Brain Bleed  Advanced Directive: [ x ] None, [  ] DNR/DNI Patient is a 89y old  Female who presents with a chief complaint of CHF exacerbation (18 Nov 2021 06:02)    HPI: Patient is a 89 year old female with PMH afib on Xarelto, CHF, HTN, GERD, osteoarthritis, anxiety, iron deficiency anemia who presents to the ER with complaint of SOB. She has been feeling short of breath for a few months now, but her symptoms have worsened over the past week. She has been having shortness of breath with minimal exertion, and some orthopnea, but is able to tolerate lying flat. She denies any cough, congestion, fever, chills, chest pain, abd pain, diarrhea, nausea, or LE pain. She reports being chronically constipated, and is on multiple medications to help her have BMs. Since her symptoms were exacerbated, she has been noticing palpitations. She has been hospitalized for atrial fibrillation before in 2019 in [place]. She has no further complaints or concerns at this time.     In the ED:   VS: T:99, HR: 127, BP: 128/78, RR;18, SpO2: 96% on room air   Labs were significant for Cl: 112, BUN: 25, alk phos: 147, proBNP: 4782  CXR: on personal read, pulmonary vascular congestion noted   EKG: , AFib with RVR, LAD, nonspecific ST and T wave abnormality  Patient received Lasix 40 mg IVP x 1    INTERVAL HPI:  (11/18/21)- Pt seen and examined at bedside this AM. She reports improvement in her difficulty breathing and her palpitations. She appears more comfortable today as well. She has no pain, and otherwise has no complaints or concerns. On IV Lasix , Pt has Home O2 uses PRN,    OVERNIGHT EVENTS: Overnight, Cardizem drip was held for low HRs and sinus pause of around 3 seconds. It was then restarted at 5mg/hr due to elevated heart rates.     Home Medications:  Ferrex 150 Plus oral capsule: 1 cap(s) orally once a day (17 Nov 2021 13:16)  furosemide 20 mg oral tablet: 1 tab(s) orally once a day (17 Nov 2021 13:16)  memantine 28 mg oral capsule, extended release: 1 cap(s) orally once a day (17 Nov 2021 13:16)  Metoprolol Tartrate 25 mg oral tablet: 2 tab(s) orally 2 times a day (17 Nov 2021 13:16)  pantoprazole 20 mg oral delayed release tablet: 1 tab(s) orally once a day (17 Nov 2021 13:16)  Vitamin D3 1250 mcg (50,000 intl units) oral capsule: 1 cap(s) orally once a week on Monday  (17 Nov 2021 13:16)  Xarelto 15 mg oral tablet: 1 tab(s) orally once a day (in the evening) (17 Nov 2021 13:16)  zolpidem 10 mg oral tablet: 1 tab(s) orally once a day (at bedtime), As Needed (17 Nov 2021 13:16)    MEDICATIONS  (STANDING):  diltiazem Infusion 5 mG/Hr (5 mL/Hr) IV Continuous <Continuous>  ergocalciferol 78756 Unit(s) Oral <User Schedule>  ferrous    sulfate 325 milliGRAM(s) Oral daily  furosemide   Injectable 40 milliGRAM(s) IV Push every 12 hours  influenza  Vaccine (HIGH DOSE) 0.7 milliLiter(s) IntraMuscular once  memantine 10 milliGRAM(s) Oral two times a day  metoprolol tartrate 50 milliGRAM(s) Oral every 8 hours  pantoprazole    Tablet 20 milliGRAM(s) Oral daily  potassium chloride    Tablet ER 40 milliEquivalent(s) Oral every 4 hours  rivaroxaban 15 milliGRAM(s) Oral daily    MEDICATIONS  (PRN):  acetaminophen     Tablet .. 650 milliGRAM(s) Oral every 6 hours PRN Temp greater or equal to 38C (100.4F), Mild Pain (1 - 3)  zolpidem 5 milliGRAM(s) Oral at bedtime PRN Insomnia    No Known Allergies    Social History:  Pt lives alone at home, receives help from an aide a few times a week for a few hours at a time to help with IADLs.  Denies EtOH, tobacco, and recreational drug use.   Uses walker to ambulate at home. (17 Nov 2021 13:14)    REVIEW OF SYSTEMS: i feel a Lot better  CONSTITUTIONAL: No fever, No chills, No Body ache, No Weakness  EYES: No eye pain, No visual disturbances, No discharge, No Redness  ENMT: No sinus pain, No throat pain, No Congestion  NECK: No pain, No stiffness  RESPIRATORY: No cough, No wheezing, No shortness of breath  CARDIOVASCULAR: No chest pain, No palpitations  GASTROINTESTINAL: No abdominal pain, No nausea, No vomiting, No diarrhea, No constipation; [  ] BM  GENITOURINARY: No dysuria, No frequency, No urgency, No incontinence  NEUROLOGICAL: No headaches, No dizziness, No numbness  EXTREMITIES: No Swelling, No Pain, No Edema  SKIN: [ x ] No itching, burning, rashes  MUSCULOSKELETAL: No joint pain, No joint swelling; No muscle pain, No back pain, No extremity pain  PSYCHIATRIC: No depression, No anxiety, No difficulty sleeping at night  PAIN SCALE: [ x ] None  [  ] Other-  ROS Unable to obtain due to: [  ] Dementia  [  ] Lethargy  [  ] Sedated  [  ] Non verbal  REST OF REVIEW OF SYSTEMS: [ x ] Normal     Vital Signs Last 24 Hrs  T(C): 36.7 (18 Nov 2021 04:30), Max: 37.2 (17 Nov 2021 10:41)  T(F): 98 (18 Nov 2021 04:30), Max: 99 (17 Nov 2021 10:41)  HR: 78 (18 Nov 2021 04:30) (56 - 145)  BP: 101/66 (18 Nov 2021 04:30) (98/62 - 133/71)  RR: 18 (18 Nov 2021 04:30) (16 - 18)  SpO2: 94% (18 Nov 2021 04:30) (92% - 96%)    CAPILLARY BLOOD GLUCOSE    I&O's Summary    17 Nov 2021 07:01  -  18 Nov 2021 07:00  --------------------------------------------------------  IN: 240 mL / OUT: 0 mL / NET: 240 mL    PHYSICAL EXAM:  GENERAL:  [ x ] NAD, [ x ] Well appearing, [  ] Agitated, [  ] Drowsy, [  ] Lethargy, [  ] Confused   HEAD:  [ x ] Normal, [  ] Other  EYES:  [ x ] EOMI, [ x ] PERRLA, [ x ] Conjunctiva and sclera clear normal, [  ] Other, [  ] Pallor, [  ] Discharge  ENMT:  [ x ] Normal, [ x ] Moist mucous membranes, [ x ] Good dentition, [ x ] No thrush  NECK:  [ x ] Supple, [ x ] No JVD, [x  ] Normal thyroid, [  ] Lymphadenopathy, [  ] Other  CHEST/LUNG:  [  ] Clear to auscultation bilaterally, [ x ] Breath Sounds equal B/L / decreased, [  ] Poor effort, [ x ] Rales bases  B/L -lung fields, [ x ] No rhonchi, [ x ] No wheezing  HEART:  [  ] Regular rate and rhythm, [  ] Tachycardia, [  ] Bradycardia, [ x ] Irregular, [ x ] No rubs, No gallops, [ ] Systolic murmur sternal border, [  ] PPM in place (Mfr:  )  ABDOMEN:  [ x ] Soft, [ x ] Nontender, [ x ] Nondistended, [ x ] No mass, [ x ] Bowel sounds present, [  ] Obese  NERVOUS SYSTEM:  [ x ] Alert & Oriented x3, [ x ] Nonfocal, [  ] Confusion, [  ] Encephalopathic, [  ] Sedated, [  ] Unable to assess, [  ] Dementia, [  ] Other-  EXTREMITIES:  [ x ] 2+ Peripheral Pulses, No clubbing, No cyanosis,  [ x ] Edema B/L lower EXT, [  ] PVD stasis skin changes B/L lower EXT, [  ] Wound  LYMPH:  No lymphadenopathy noted  SKIN:  [ x ] No rashes or lesions, [  ] Pressure ulcers, [  ] Ecchymosis, [  ] Skin tears, [  ] Other    DIET: Diet, DASH/TLC:   Sodium & Cholesterol Restricted  1000mL Fluid Restriction ([id number]) (11-17-21 @ 16:02)    LABS:                        12.1   5.48  )-----------( 211      ( 18 Nov 2021 06:53 )             38.1     18 Nov 2021 06:53    145    |  106    |  20     ----------------------------<  98     3.1     |  29     |  0.91     Ca    8.6        18 Nov 2021 06:53    TPro  6.4    /  Alb  3.1    /  TBili  0.6    /  DBili  x      /  AST  22     /  ALT  50     /  AlkPhos  141    18 Nov 2021 06:53    PT/INR - ( 17 Nov 2021 11:31 )   PT: 13.1 sec;   INR: 1.13 ratio      PTT - ( 17 Nov 2021 11:31 )  PTT:31.0 sec    CARDIAC MARKERS ( 17 Nov 2021 11:31 )  x     / x     / x     / x     / 1.6 ng/mL     Anemia Panel:    Thyroid Panel:    Serum Pro-Brain Natriuretic Peptide: 4782 pg/mL (11-17-21 @ 11:31)    RADIOLOGY & ADDITIONAL TESTS:    HEALTH ISSUES - PROBLEM Dx:  Acute exacerbation of CHF (congestive heart failure)  Atrial fibrillation  Hypertension  GERD (gastroesophageal reflux disease)  Dementia  Iron deficiency anemia  Osteoarthritis  Need for prophylactic measure    Consultant(s) Notes Reviewed:  [ x ] YES     Care Discussed with [ x ] Consultants, [ x ] Patient, [  ] Family, [  ] HCP, [  ] , [  ] Social Service, [x  ] RN, [  ] Physical Therapy, [  ] Palliative Care Team  DVT PPX: [  ] Lovenox, [  ] SC Heparin, [  ] Coumadin, [ x ] Xarelto, [  ] Eliquis, [  ] Pradaxa, [  ] IV Heparin drip, [  ] SCD, [  ] Ambulation, [  ] Contraindicated 2/2 GI Bleed, [  ] Contraindicated 2/2  Bleed, [  ] Contraindicated 2/2 Brain Bleed  Advanced Directive: [ x ] None, [  ] DNR/DNI

## 2021-11-18 NOTE — DIETITIAN INITIAL EVALUATION ADULT. - PROBLEM SELECTOR PLAN 1
Patient presenting with SOB for the past few days, likely secondary to acute on chronic CHF exacerbation   - Admit to telemetry   - Patient takes 20 mg Lasix daily, will start Lasix 40 mg IVP daily, f.u cardiology recommendations regarding diuresis  - Continue beta blocker- metoprolol 50 mg BID --> change to q 8 hrs   - Last TTE per patient was   - Obtain TTE   - Strict I&Os, daily weights   - Cardio (Dr Mukherjee) consulted; f/u recommendations,

## 2021-11-18 NOTE — PROGRESS NOTE ADULT - ATTENDING COMMENTS
Chart reviewed    Patient seen and examined    Agree with plan as outlined above    89 year old female with PMH afib on Xarelto, CHF, HTN, GERD, osteoarthritis, anxiety, iron deficiency anemia presents with decompensated heart failure    - She is in acute decompensated HF.   - cont supplemental O2 as necessary  - BNP is elevated  - continue Lasix 40mg IV q12 for goal net negative fluid balance   - currently net even  - Please continue to maintain strict I/Os, monitor daily weights, Cr, and K.     - Has a murmur of ?mod/mod-severe AS  - f/u TTE     - no signs of ischemia. Hs trop is neg. Doubt 2/2 ACS    - AF is uncontrolled, this is likely primarily driven by her ADHF  - remains HR: 78 (11-18 @ 04:30) (56 - 145, with an episode of 3.3 sec pause on tele, continue cardizem gtt, would transition to PO   - cont metoprolol would  - was on dig but stopped 2/2 elevated level.   - f/u check dig level, would resume QOD if needed  - cont Xarelto   - continue telemonitoring   - Monitor and replete Lytes. Keep K > 4 and Mg > 2    - All other medical needs as per primary team.  - Other cardiovascular workup will depend on clinical course.  - Will continue to follow.

## 2021-11-18 NOTE — PHYSICAL THERAPY INITIAL EVALUATION ADULT - PATIENT PROFILE REVIEW, REHAB EVAL
Reason for Call:  Other call back    Detailed comments: Patient was seen yesterday. Patient states his prescription for  LANTUS SOLOSTAR 100 UNIT/ML soln was to be updated but [facility] - [place], [place] - [address] does not have it. Please advise    Phone Number Patient can be reached at: Home number on file [phone] (home)    Best Time: Any    Can we leave a detailed message on this number? YES    Call taken on 3/16/2018 at 11:51 AM by Lynn Carey     yes

## 2021-11-18 NOTE — PHYSICAL THERAPY INITIAL EVALUATION ADULT - PERTINENT HX OF CURRENT PROBLEM, REHAB EVAL
89 yo F presents to the ER with SOB worsened over the past week.  Admitted for  Ac on chronic CHF exacerbation. CXR: Suspect LEFT basilar airspace disease and/or effusion obscuring LEFT diaphragm contour.

## 2021-11-18 NOTE — DIETITIAN INITIAL EVALUATION ADULT. - OTHER INFO
89 y/o female adm with HF. PMH Fe def anemia, GERD, dementia, OA, HTN, HF, afib. Pt visited at bedside this am, daughter present. Pt's appetite is starting to resume. #. Provided pt and pt's daughter with verbal and written information re: HF/nutrition. Understanding has been verbalized and compliance is expected.

## 2021-11-18 NOTE — PROGRESS NOTE ADULT - SUBJECTIVE AND OBJECTIVE BOX
Date/Time Patient Seen:  		  Referring MD:   Data Reviewed	       Patient is a 89y old  Female who presents with a chief complaint of CHF exacerbation (17 Nov 2021 14:15)      Subjective/HPI     PAST MEDICAL & SURGICAL HISTORY:  Chronic atrial fibrillation    CHF, chronic    Hypertension    Osteoarthritis    Dementia    GERD (gastroesophageal reflux disease)    Iron deficiency anemia    History of cholecystectomy          Medication list         MEDICATIONS  (STANDING):  diltiazem Infusion 5 mG/Hr (5 mL/Hr) IV Continuous <Continuous>  ergocalciferol 83387 Unit(s) Oral <User Schedule>  ferrous    sulfate 325 milliGRAM(s) Oral daily  furosemide   Injectable 40 milliGRAM(s) IV Push every 12 hours  influenza  Vaccine (HIGH DOSE) 0.7 milliLiter(s) IntraMuscular once  memantine 10 milliGRAM(s) Oral two times a day  metoprolol tartrate 50 milliGRAM(s) Oral every 8 hours  pantoprazole    Tablet 20 milliGRAM(s) Oral daily  rivaroxaban 15 milliGRAM(s) Oral daily    MEDICATIONS  (PRN):  acetaminophen     Tablet .. 650 milliGRAM(s) Oral every 6 hours PRN Temp greater or equal to 38C (100.4F), Mild Pain (1 - 3)  zolpidem 5 milliGRAM(s) Oral at bedtime PRN Insomnia         Vitals log        ICU Vital Signs Last 24 Hrs  T(C): 36.7 (18 Nov 2021 04:30), Max: 37.2 (17 Nov 2021 10:41)  T(F): 98 (18 Nov 2021 04:30), Max: 99 (17 Nov 2021 10:41)  HR: 78 (18 Nov 2021 04:30) (56 - 145)  BP: 101/66 (18 Nov 2021 04:30) (98/62 - 133/71)  BP(mean): --  ABP: --  ABP(mean): --  RR: 18 (18 Nov 2021 04:30) (16 - 18)  SpO2: 94% (18 Nov 2021 04:30) (92% - 96%)           Input and Output:  I&O's Detail      Lab Data                        12.0   7.87  )-----------( 212      ( 17 Nov 2021 11:31 )             36.5     11-17    145  |  112<H>  |  25<H>  ----------------------------<  98  4.1   |  21<L>  |  0.96    Ca    8.8      17 Nov 2021 11:31    TPro  6.3  /  Alb  3.0<L>  /  TBili  0.5  /  DBili  x   /  AST  24  /  ALT  58  /  AlkPhos  147<H>  11-17      CARDIAC MARKERS ( 17 Nov 2021 11:31 )  x     / x     / x     / x     / 1.6 ng/mL        Review of Systems	      Objective     Physical Examination    heart s1s2  lung dec BS  abd soft  head nc  verbal      Pertinent Lab findings & Imaging      Leah:  NO   Adequate UO     I&O's Detail           Discussed with:     Cultures:	        Radiology

## 2021-11-18 NOTE — DIETITIAN INITIAL EVALUATION ADULT. - ORAL INTAKE PTA/DIET HISTORY
poor appetite for a few days pta  usually eats well  doesn't completely avoid salt, however doesn't use too much

## 2021-11-18 NOTE — PROGRESS NOTE ADULT - ASSESSMENT
89 year old female with PMH afib on Xarelto, CHF, HTN, GERD, osteoarthritis, anxiety, iron deficiency anemia presents with decompensated heart failure    - She is in acute decompensated HF.   - cont supplemental O2 as necessary  - BNP is elevated  - continue Lasix 40mg IV q12  - currently net even   - Please continue to maintain strict I/Os, monitor daily weights, Cr, and K.     - Has a mumur of ?mod/mod-severe AS  - f/u TTE     - no signs of ischemia. Hs trop is neg. Doubt 2/2 ACS    - AF is uncontrolled, this is likely primarily driven by her ADHF  - remains HR: 78 (11-18 @ 04:30) (56 - 145, with an episode of 3.3 sec pause on tele, continue cardizem gtt  - cont metoprolol would  - was on dig but stopped 2/2 elevated level.   - f/u check dig level, would resume QOD if needed  - cont Xarelto   - continue telemonitoring   - Monitor and replete Lytes. Keep K > 4 and Mg > 2    - All other medical needs as per primary team.  - Other cardiovascular workup will depend on clinical course.  - Will continue to follow.    Becka Haider, [facility]  Nurse Practitioner - Cardiology   Spectra #[phone]/ [phone]     89 year old female with PMH afib on Xarelto, CHF, HTN, GERD, osteoarthritis, anxiety, iron deficiency anemia presents with decompensated heart failure    - She is in acute decompensated HF.   - cont supplemental O2 as necessary  - BNP is elevated  - continue Lasix 40mg IV q12 for goal net negative fluid balance   - currently net even  - Please continue to maintain strict I/Os, monitor daily weights, Cr, and K.     - Has a murmur of ?mod/mod-severe AS  - f/u TTE     - no signs of ischemia. Hs trop is neg. Doubt 2/2 ACS    - AF is uncontrolled, this is likely primarily driven by her ADHF  - remains HR: 78 (11-18 @ 04:30) (56 - 145, with an episode of 3.3 sec pause on tele, continue cardizem gtt, would transition to PO   - cont metoprolol would  - was on dig but stopped 2/2 elevated level.   - f/u check dig level, would resume QOD if needed  - cont Xarelto   - continue telemonitoring   - Monitor and replete Lytes. Keep K > 4 and Mg > 2    - All other medical needs as per primary team.  - Other cardiovascular workup will depend on clinical course.  - Will continue to follow.    Becka Haider, [facility]  Nurse Practitioner - Cardiology   Spectra #[phone]/ [phone]

## 2021-11-18 NOTE — PHYSICAL THERAPY INITIAL EVALUATION ADULT - MANUAL MUSCLE TESTING RESULTS, REHAB EVAL
Reason for PC Consult: Advance Care Planning    Consulted by:   Dr. Robin    Assessment:  General:   67 year old female admitted for sepsis on 10/25/17. Pt has a history of macular degeneration, blind in right eye, lymphedema of left ankle, arthritis, colon cancer, liver cancer, and diabetes type 2. Pt was recently discharged from [facility] with a new diagnosis of widely metastatic colon cancer, PE and DVT. Pt was discharged to follow up with oncology, however she felt dizzy and weak with a subsequent fall. Pt was unable to ambulate upon arrival with increased abdominal pain.     Dyspnea: Yes, 97% on 4L NC  Last BM:  (PTA)    Pain: Unable to determine    Depression: Unable to determine     Spiritual:  Is Caodaism or spirituality important for coping with this illness? No    Has a  or spiritual provider visit been requested? No    Palliative Performance Scale: 10%    Advance Directive: None on File  DPOA: None on File, ARIAS Kavinsheila Landry ([phone])    POLST: None on File    Code Status: Comfort Measures      Outcome:  PC RN introduced self and role of PC to family. Family recently decided to transistion pt to comfort care. PC RN answered questions. Pt's spouse requested coffee, PC RN provided a cup of coffee to spouse. They have no further questions.    Emotional support and therapeutic touch provided to family. Bedside RN already requested a bereavement tray, family declined a visit from .         Plan:   Continue to support pt and family.     Thank you for allowing Palliative Care to participate in this patient's care. Please feel free to call [phone] with any questions or concerns.   BUE/ BLE grossly 3+/5 throughout

## 2021-11-18 NOTE — PROGRESS NOTE ADULT - PROBLEM SELECTOR PLAN 3
Chronic, stable   - Continue metoprolol 50 mg q 8 hrs with hold parameters  - Continue cardizem drip, transition to PO when indicated  - Monitor routine hemodynamics

## 2021-11-18 NOTE — PROGRESS NOTE ADULT - ATTENDING COMMENTS
Patient is a 89 year old female with PMH afib on Xarelto, CHF, HTN, GERD, osteoarthritis, anxiety, iron deficiency anemia who presents to the ER with complaint of SOB, admitted for CHF exacerbation.  Pt seen, examined, case & care plan d/w pt, resident at detail.  AM Labs   PO diet   ECHO  D/W  Dtr at bed side   Palliative Care eval for GOC. Dtr is HCP.  PT Eval.    Total care time is 45 minutes.

## 2021-11-18 NOTE — DIETITIAN INITIAL EVALUATION ADULT. - PROBLEM SELECTOR PLAN 2
History of afib on Xarelto  - Continue home dose of Xarelto 15 mg daily  - Given 5 mg Lopressor IVP x 1 dose.  -On Lopressor 50 mg 1 tab q 8 hrs   - F/u cardiology recs regarding rate control strategy  - Pt will be admitted to telemetry for cardiac monitoring- A Fib with RVR -start IV Cardizem Drip   ECHO, TFT's

## 2021-11-18 NOTE — PROGRESS NOTE ADULT - PROBLEM SELECTOR PLAN 2
History of afib on Xarelto  - Continue home dose of Xarelto 15 mg daily  - Given 5 mg Lopressor IVP x 1 dose.  - On Lopressor 50 mg 1 tab q 8 hrs   - Continue Cardizem drip at 5 mh/hr, consider transitioning to PO Cardizem if rates controlled on tele monitor  - Cardiology following  - Pt will be admitted to telemetry for cardiac monitoring- A Fib with RVR   ECHO, TFT's History of afib , now with RVR   -on Xarelto  -S/P IV Cardizem drip   - Continue home dose of Xarelto 15 mg daily  - Given 5 mg Lopressor IVP x 1 dose.  - On Lopressor 50 mg 1 tab q 8 hrs   - Continue Cardizem drip at 5 mh/hr, consider transitioning to PO Cardizem 30 mg q 6 hrs    - Cardiology following Dr Gray   ECHO, TFT's

## 2021-11-18 NOTE — DIETITIAN INITIAL EVALUATION ADULT. - PROBLEM SELECTOR PLAN 3
Chronic, stable   - Continue metoprolol 50 mg q 8 hrs with hold parameters  - Monitor routine hemodynamics

## 2021-11-18 NOTE — PROGRESS NOTE ADULT - SUBJECTIVE AND OBJECTIVE BOX
[facility] -- Sage Monzon, Isaac, Jessica, Lonny Alegre, Isabella Handley: Office # [phone]    Follow Up:  Afib with RVR    Subjective/Observations: Patient seen and examined, awake, alert, resting comfortably in bed, no complaints of chest pain, dyspnea, palpitations or dizziness, orthopnea and PND. Tolerating room air. IVF / ABX infusing     REVIEW OF SYSTEMS: All review of systems is negative for eye, ENT, GI, , allergic, dermatologic, musculoskeletal and neurologic except as described above    PAST MEDICAL & SURGICAL HISTORY:  Chronic atrial fibrillation    CHF, chronic    Hypertension    Osteoarthritis    Dementia    GERD (gastroesophageal reflux disease)    Iron deficiency anemia    History of cholecystectomy        MEDICATIONS  (STANDING):  diltiazem Infusion 5 mG/Hr (5 mL/Hr) IV Continuous <Continuous>  ergocalciferol 78052 Unit(s) Oral <User Schedule>  ferrous    sulfate 325 milliGRAM(s) Oral daily  furosemide   Injectable 40 milliGRAM(s) IV Push every 12 hours  influenza  Vaccine (HIGH DOSE) 0.7 milliLiter(s) IntraMuscular once  memantine 10 milliGRAM(s) Oral two times a day  metoprolol tartrate 50 milliGRAM(s) Oral every 8 hours  pantoprazole    Tablet 20 milliGRAM(s) Oral daily  potassium chloride   Powder 40 milliEquivalent(s) Oral once  rivaroxaban 15 milliGRAM(s) Oral daily    MEDICATIONS  (PRN):  acetaminophen     Tablet .. 650 milliGRAM(s) Oral every 6 hours PRN Temp greater or equal to 38C (100.4F), Mild Pain (1 - 3)  zolpidem 5 milliGRAM(s) Oral at bedtime PRN Insomnia    Allergies    No Known Allergies    Intolerances      Vital Signs Last 24 Hrs  T(C): 36.7 (18 Nov 2021 04:30), Max: 37.1 (17 Nov 2021 11:09)  T(F): 98 (18 Nov 2021 04:30), Max: 98.7 (17 Nov 2021 11:09)  HR: 78 (18 Nov 2021 04:30) (56 - 145)  BP: 101/66 (18 Nov 2021 04:30) (98/62 - 133/71)  BP(mean): --  RR: 18 (18 Nov 2021 04:30) (16 - 18)  SpO2: 94% (18 Nov 2021 04:30) (92% - 96%)  I&O's Summary    17 Nov 2021 07:01  -  18 Nov 2021 07:00  --------------------------------------------------------  IN: 240 mL / OUT: 0 mL / NET: 240 mL          TELE: afib 80's-150's with 3.3 sec pause overnight   PHYSICAL EXAM:  Appearance: NAD, no distress, alert,  HEENT: Moist Mucous Membranes, Anicteric  Cardiovascular: Regular rate and rhythm, Normal S1 S2, No JVD, No murmurs, No rubs, gallops or clicks  Respiratory: Non-labored, Clear to auscultation, No rales, No rhonchi, No wheezing.   Gastrointestinal:  Soft, Non-tender, + BS  Neurologic: Non-focal  Skin: Warm and dry, No visible rashes or ulcers, No ecchymosis, No cyanosis  Musculoskeletal: No clubbing, No cyanosis, No joint swelling/tenderness  Psychiatry: Mood & affect appropriate  Lymph: No peripheral edema.     LABS: All Labs Reviewed:                        12.1   5.48  )-----------( 211      ( 18 Nov 2021 06:53 )             38.1                         12.0   7.87  )-----------( 212      ( 17 Nov 2021 11:31 )             36.5     18 Nov 2021 06:53    145    |  106    |  20     ----------------------------<  98     3.1     |  29     |  0.91   17 Nov 2021 11:31    145    |  112    |  25     ----------------------------<  98     4.1     |  21     |  0.96     Ca    8.6        18 Nov 2021 06:53  Ca    8.8        17 Nov 2021 11:31    TPro  6.4    /  Alb  3.1    /  TBili  0.6    /  DBili  x      /  AST  22     /  ALT  50     /  AlkPhos  141    18 Nov 2021 06:53  TPro  6.3    /  Alb  3.0    /  TBili  0.5    /  DBili  x      /  AST  24     /  ALT  58     /  AlkPhos  147    17 Nov 2021 11:31    PT/INR - ( 17 Nov 2021 11:31 )   PT: 13.1 sec;   INR: 1.13 ratio         PTT - ( 17 Nov 2021 11:31 )  PTT:31.0 sec  Troponin I, High Sensitivity Result: 20.2 ng/L (11-17-21 @ 11:31)                12 Lead ECG:   Ventricular Rate 129 BPM    Atrial Rate 138 BPM    QRS Duration 110 ms    Q-T Interval 268 ms    QTC Calculation(Bazett) 392 ms    R Axis -40 degrees    T Axis 161 degrees    Diagnosis Line Atrial fibrillation with rapid ventricular response  Left axis deviation  ST & T wave abnormality, consider anterior ischemia  Abnormal ECG  No previous ECGs available  Confirmed by JONATAN DUNN ([id number]) on 11/17/2021 6:45:34 PM (11-17-21 @ 13:00)    < from: Xray Chest 1 View AP/PA (11.17.21 @ 11:59) >    EXAM:  XR CHEST AP OR PA 1V                            PROCEDURE DATE:  11/17/2021          INTERPRETATION:  Portable chest radiograph    CLINICAL INFORMATION: Dyspnea, shortness of breath.    TECHNIQUE:  Portable  AP view of the chest.    COMPARISON: No previous examinations are available for review.    FINDINGS:    Ill-defined LEFT diaphragmatic contour concerning for LEFT basilar airspace disease and/or effusion. Remaining of visualized lung parenchyma grossly clear.   No pneumothorax.    The  heart is enlarged in transverse diameter. No hilar mass.    Visualized osseous structures are intact.    IMPRESSION: Cardiomegaly  Suspect LEFT basilar airspace disease and/or effusion obscuring LEFT diaphragm contour..    --- End of Report ---            RADHA WASHINGTON MD; Attending Radiologist  This document has been electronically signed. Nov 17 2021  9:10PM    < end of copied text >   [facility] -- Sage Monzon, Isaac, Jessica, Lonny Alegre, Isabella Handley: Office # [phone]    Follow Up:  Afib with RVR    Subjective/Observations: Patient seen and examined, awake, alert, resting comfortably in bed, no complaints of chest pain, dyspnea, palpitations or dizziness, orthopnea and PND. Tolerating room air. IVF / ABX infusing     REVIEW OF SYSTEMS: All review of systems is negative for eye, ENT, GI, , allergic, dermatologic, musculoskeletal and neurologic except as described above    PAST MEDICAL & SURGICAL HISTORY:  Chronic atrial fibrillation    CHF, chronic    Hypertension    Osteoarthritis    Dementia    GERD (gastroesophageal reflux disease)    Iron deficiency anemia    History of cholecystectomy        MEDICATIONS  (STANDING):  diltiazem Infusion 5 mG/Hr (5 mL/Hr) IV Continuous <Continuous>  ergocalciferol 11956 Unit(s) Oral <User Schedule>  ferrous    sulfate 325 milliGRAM(s) Oral daily  furosemide   Injectable 40 milliGRAM(s) IV Push every 12 hours  influenza  Vaccine (HIGH DOSE) 0.7 milliLiter(s) IntraMuscular once  memantine 10 milliGRAM(s) Oral two times a day  metoprolol tartrate 50 milliGRAM(s) Oral every 8 hours  pantoprazole    Tablet 20 milliGRAM(s) Oral daily  potassium chloride   Powder 40 milliEquivalent(s) Oral once  rivaroxaban 15 milliGRAM(s) Oral daily    MEDICATIONS  (PRN):  acetaminophen     Tablet .. 650 milliGRAM(s) Oral every 6 hours PRN Temp greater or equal to 38C (100.4F), Mild Pain (1 - 3)  zolpidem 5 milliGRAM(s) Oral at bedtime PRN Insomnia    Allergies    No Known Allergies    Intolerances      Vital Signs Last 24 Hrs  T(C): 36.7 (18 Nov 2021 04:30), Max: 37.1 (17 Nov 2021 11:09)  T(F): 98 (18 Nov 2021 04:30), Max: 98.7 (17 Nov 2021 11:09)  HR: 78 (18 Nov 2021 04:30) (56 - 145)  BP: 101/66 (18 Nov 2021 04:30) (98/62 - 133/71)  BP(mean): --  RR: 18 (18 Nov 2021 04:30) (16 - 18)  SpO2: 94% (18 Nov 2021 04:30) (92% - 96%)  I&O's Summary    17 Nov 2021 07:01  -  18 Nov 2021 07:00  --------------------------------------------------------  IN: 240 mL / OUT: 0 mL / NET: 240 mL          TELE: afib 80's-150's with 3.3 sec pause overnight   PHYSICAL EXAM:  Appearance: NAD, no distress, alert,  HEENT: Moist Mucous Membranes, Anicteric  Cardiovascular: Regular rate and rhythm, Normal S1 S2, No JVD, + murmurs, No rubs, gallops or clicks  Respiratory: Non-labored, Clear to auscultation, No rales, No rhonchi, No wheezing.   Gastrointestinal:  Soft, Non-tender, + BS  Neurologic: Non-focal  Skin: Warm and dry, No visible rashes or ulcers, No ecchymosis, No cyanosis  Musculoskeletal: No clubbing, No cyanosis, No joint swelling/tenderness  Psychiatry: Mood & affect appropriate  Lymph: +1 B/L LE edema.     LABS: All Labs Reviewed:                        12.1   5.48  )-----------( 211      ( 18 Nov 2021 06:53 )             38.1                         12.0   7.87  )-----------( 212      ( 17 Nov 2021 11:31 )             36.5     18 Nov 2021 06:53    145    |  106    |  20     ----------------------------<  98     3.1     |  29     |  0.91   17 Nov 2021 11:31    145    |  112    |  25     ----------------------------<  98     4.1     |  21     |  0.96     Ca    8.6        18 Nov 2021 06:53  Ca    8.8        17 Nov 2021 11:31    TPro  6.4    /  Alb  3.1    /  TBili  0.6    /  DBili  x      /  AST  22     /  ALT  50     /  AlkPhos  141    18 Nov 2021 06:53  TPro  6.3    /  Alb  3.0    /  TBili  0.5    /  DBili  x      /  AST  24     /  ALT  58     /  AlkPhos  147    17 Nov 2021 11:31    PT/INR - ( 17 Nov 2021 11:31 )   PT: 13.1 sec;   INR: 1.13 ratio         PTT - ( 17 Nov 2021 11:31 )  PTT:31.0 sec  Troponin I, High Sensitivity Result: 20.2 ng/L (11-17-21 @ 11:31)                12 Lead ECG:   Ventricular Rate 129 BPM    Atrial Rate 138 BPM    QRS Duration 110 ms    Q-T Interval 268 ms    QTC Calculation(Bazett) 392 ms    R Axis -40 degrees    T Axis 161 degrees    Diagnosis Line Atrial fibrillation with rapid ventricular response  Left axis deviation  ST & T wave abnormality, consider anterior ischemia  Abnormal ECG  No previous ECGs available  Confirmed by JONATAN DUNN ([id number]) on 11/17/2021 6:45:34 PM (11-17-21 @ 13:00)    < from: Xray Chest 1 View AP/PA (11.17.21 @ 11:59) >    EXAM:  XR CHEST AP OR PA 1V                            PROCEDURE DATE:  11/17/2021          INTERPRETATION:  Portable chest radiograph    CLINICAL INFORMATION: Dyspnea, shortness of breath.    TECHNIQUE:  Portable  AP view of the chest.    COMPARISON: No previous examinations are available for review.    FINDINGS:    Ill-defined LEFT diaphragmatic contour concerning for LEFT basilar airspace disease and/or effusion. Remaining of visualized lung parenchyma grossly clear.   No pneumothorax.    The  heart is enlarged in transverse diameter. No hilar mass.    Visualized osseous structures are intact.    IMPRESSION: Cardiomegaly  Suspect LEFT basilar airspace disease and/or effusion obscuring LEFT diaphragm contour..    --- End of Report ---            RADHA WASHINGTON MD; Attending Radiologist  This document has been electronically signed. Nov 17 2021  9:10PM    < end of copied text >

## 2021-11-18 NOTE — PROGRESS NOTE ADULT - PROBLEM SELECTOR PLAN 1
Patient presenting with SOB for the past few days, likely secondary to acute on chronic CHF exacerbation   - Admit to telemetry   - Patient takes 20 mg Lasix daily, will start Lasix 40 mg IVP BID, cardiology recommending aggressive diuresis at this time  - Continue beta blocker- metoprolol 50 mg BID --> change to q 8 hrs   - Last TTE per patient was   - Obtain TTE   - Strict I&Os, daily weights   - Cardio (Dr Mukherjee) consulted; f/u recommendations, - acute on chronic CHF exacerbation  2/2 Rapid AFib, RVR  -Tele    -Lasix 40 mg IVP BID, Fluid restriction 1200 ml/.24 hrs   - Continue beta blocker- metoprolol 50 mg q 8 hrs   -  TTE   - Strict I&Os, daily weights   - Cardio (Dr osei's group consulted; f/u recommendations,

## 2021-11-18 NOTE — PROGRESS NOTE ADULT - ASSESSMENT
Patient is a 89 year old female with PMH afib on Xarelto, CHF, HTN, GERD, osteoarthritis, anxiety, iron deficiency anemia who presents to the ER with complaint of SOB, admitted for acute on chronic CHF exacerbation.

## 2021-11-19 ENCOUNTER — TRANSCRIPTION ENCOUNTER (OUTPATIENT)
Age: 86
End: 2021-11-19

## 2021-11-19 LAB
ANION GAP SERPL CALC-SCNC: 10 MMOL/L — SIGNIFICANT CHANGE UP (ref 5–17)
BUN SERPL-MCNC: 26 MG/DL — HIGH (ref 7–23)
CALCIUM SERPL-MCNC: 9.1 MG/DL — SIGNIFICANT CHANGE UP (ref 8.5–10.1)
CHLORIDE SERPL-SCNC: 109 MMOL/L — HIGH (ref 96–108)
CO2 SERPL-SCNC: 25 MMOL/L — SIGNIFICANT CHANGE UP (ref 22–31)
CREAT SERPL-MCNC: 1.3 MG/DL — SIGNIFICANT CHANGE UP (ref 0.5–1.3)
GLUCOSE SERPL-MCNC: 115 MG/DL — HIGH (ref 70–99)
HCT VFR BLD CALC: 41.4 % — SIGNIFICANT CHANGE UP (ref 34.5–45)
HGB BLD-MCNC: 13.5 G/DL — SIGNIFICANT CHANGE UP (ref 11.5–15.5)
MAGNESIUM SERPL-MCNC: 2.4 MG/DL — SIGNIFICANT CHANGE UP (ref 1.6–2.6)
MCHC RBC-ENTMCNC: 27.8 PG — SIGNIFICANT CHANGE UP (ref 27–34)
MCHC RBC-ENTMCNC: 32.6 GM/DL — SIGNIFICANT CHANGE UP (ref 32–36)
MCV RBC AUTO: 85.4 FL — SIGNIFICANT CHANGE UP (ref 80–100)
NRBC # BLD: 0 /100 WBCS — SIGNIFICANT CHANGE UP (ref 0–0)
PHOSPHATE SERPL-MCNC: 3.4 MG/DL — SIGNIFICANT CHANGE UP (ref 2.5–4.5)
PLATELET # BLD AUTO: 230 K/UL — SIGNIFICANT CHANGE UP (ref 150–400)
POTASSIUM SERPL-MCNC: 4.5 MMOL/L — SIGNIFICANT CHANGE UP (ref 3.5–5.3)
POTASSIUM SERPL-SCNC: 4.5 MMOL/L — SIGNIFICANT CHANGE UP (ref 3.5–5.3)
RBC # BLD: 4.85 M/UL — SIGNIFICANT CHANGE UP (ref 3.8–5.2)
RBC # FLD: 17.2 % — HIGH (ref 10.3–14.5)
SODIUM SERPL-SCNC: 144 MMOL/L — SIGNIFICANT CHANGE UP (ref 135–145)
WBC # BLD: 7.5 K/UL — SIGNIFICANT CHANGE UP (ref 3.8–10.5)
WBC # FLD AUTO: 7.5 K/UL — SIGNIFICANT CHANGE UP (ref 3.8–10.5)

## 2021-11-19 PROCEDURE — 99233 SBSQ HOSP IP/OBS HIGH 50: CPT

## 2021-11-19 RX ORDER — FUROSEMIDE 40 MG
40 TABLET ORAL EVERY 12 HOURS
Refills: 0 | Status: DISCONTINUED | OUTPATIENT
Start: 2021-11-20 | End: 2021-11-20

## 2021-11-19 RX ORDER — DILTIAZEM HCL 120 MG
30 CAPSULE, EXT RELEASE 24 HR ORAL EVERY 6 HOURS
Refills: 0 | Status: DISCONTINUED | OUTPATIENT
Start: 2021-11-19 | End: 2021-11-21

## 2021-11-19 RX ORDER — DIGOXIN 250 MCG
125 TABLET ORAL EVERY OTHER DAY
Refills: 0 | Status: DISCONTINUED | OUTPATIENT
Start: 2021-11-19 | End: 2021-11-22

## 2021-11-19 RX ADMIN — Medication 50 MILLIGRAM(S): at 05:41

## 2021-11-19 RX ADMIN — Medication 125 MICROGRAM(S): at 16:54

## 2021-11-19 RX ADMIN — MEMANTINE HYDROCHLORIDE 10 MILLIGRAM(S): 10 TABLET ORAL at 05:41

## 2021-11-19 RX ADMIN — PANTOPRAZOLE SODIUM 20 MILLIGRAM(S): 20 TABLET, DELAYED RELEASE ORAL at 12:04

## 2021-11-19 RX ADMIN — Medication 40 MILLIGRAM(S): at 05:41

## 2021-11-19 RX ADMIN — Medication 325 MILLIGRAM(S): at 12:03

## 2021-11-19 RX ADMIN — Medication 30 MILLIGRAM(S): at 17:38

## 2021-11-19 RX ADMIN — RIVAROXABAN 15 MILLIGRAM(S): KIT at 12:04

## 2021-11-19 RX ADMIN — SENNA PLUS 2 TABLET(S): 8.6 TABLET ORAL at 21:00

## 2021-11-19 RX ADMIN — ZOLPIDEM TARTRATE 10 MILLIGRAM(S): 10 TABLET ORAL at 20:47

## 2021-11-19 RX ADMIN — Medication 30 MILLIGRAM(S): at 05:41

## 2021-11-19 RX ADMIN — MEMANTINE HYDROCHLORIDE 10 MILLIGRAM(S): 10 TABLET ORAL at 17:39

## 2021-11-19 NOTE — PROGRESS NOTE ADULT - ASSESSMENT
pt with AF - HTN - OP - OA - Hard of Hearing - HF - valvular heart disease - on AC - now with LE edema and more sob - mayer -     on LASIX - Cardizem - HF and AF optimization management in progress  remains on o2 support  keep sat > 88 pct  pt is DNR  TTE report pending    cardio eval in progress - Judicious Diuresis - I and O - cvs rx regimen and BP control - rate control for AF - TTE -     CXR reviewed - eff - atelectasis -     monitor VS and HD and Sat    GOC discussion - DNR    dietary discretion    proBNP consistent with vol overload - HF -     spoke with Daughter and Son in Law

## 2021-11-19 NOTE — DISCHARGE NOTE PROVIDER - NSDCMRMEDTOKEN_GEN_ALL_CORE_FT
Ferrex 150 Plus oral capsule: 1 cap(s) orally once a day  furosemide 20 mg oral tablet: 1 tab(s) orally once a day  memantine 28 mg oral capsule, extended release: 1 cap(s) orally once a day  Metoprolol Tartrate 25 mg oral tablet: 2 tab(s) orally 2 times a day  pantoprazole 20 mg oral delayed release tablet: 1 tab(s) orally once a day  Vitamin D3 1250 mcg (50,000 intl units) oral capsule: 1 cap(s) orally once a week on Monday   Xarelto 15 mg oral tablet: 1 tab(s) orally once a day (in the evening)  zolpidem 10 mg oral tablet: 1 tab(s) orally once a day (at bedtime), As Needed   digoxin 125 mcg (0.125 mg) oral tablet: 1 tab(s) orally every other day  dilTIAZem 120 mg/24 hours oral capsule, extended release: 1 cap(s) orally once a day  Ferrex 150 Plus oral capsule: 1 cap(s) orally once a day  Lasix 40 mg oral tablet: 1 tab(s) orally once a day  memantine 28 mg oral capsule, extended release: 1 cap(s) orally once a day  metoprolol tartrate 50 mg oral tablet: 1 tab(s) orally every 8 hours  Outpatient PT: Outpatient PT for R26.2   Please evaluate and treat  pantoprazole 20 mg oral delayed release tablet: 1 tab(s) orally once a day  potassium chloride 15 mEq oral powder for reconstitution: 1 packet(s) orally every othe day   If you dont take Lasix , DO NOT take K Cl  rivaroxaban 15 mg oral tablet: 1 tab(s) orally once a day  Vitamin D3 1250 mcg (50,000 intl units) oral capsule: 1 cap(s) orally once a week on Monday   zolpidem 10 mg oral tablet: 1 tab(s) orally once a day (at bedtime), As Needed

## 2021-11-19 NOTE — DISCHARGE NOTE PROVIDER - NSDCCPCAREPLAN_GEN_ALL_CORE_FT
PRINCIPAL DISCHARGE DIAGNOSIS  Diagnosis: Atrial fibrillation  Assessment and Plan of Treatment: You have a history of atrial fibrillation, which is an abnormal heart rhythm that can make your heart beat very fast. You were treated with medications that slow down your heart rate and help control your heart rhythm. On discharge:  - Follow up with your cardiologuist and primary care doctor for further monitoring and recommendations 1 week after discharge.  - CONTINUE Xarelto 15 mg daily with food  - CONTINUE digoxin, metoprolol, and Cardizem      SECONDARY DISCHARGE DIAGNOSES  Diagnosis: Acute on chronic systolic congestive heart failure  Assessment and Plan of Treatment:     Diagnosis: Hypertension  Assessment and Plan of Treatment:     Diagnosis: GERD (gastroesophageal reflux disease)  Assessment and Plan of Treatment:     Diagnosis: Iron deficiency anemia  Assessment and Plan of Treatment:     Diagnosis: Dementia  Assessment and Plan of Treatment:      PRINCIPAL DISCHARGE DIAGNOSIS  Diagnosis: Atrial fibrillation  Assessment and Plan of Treatment: You have a history of atrial fibrillation, which is an abnormal heart rhythm that can make your heart beat very fast. You were treated with medications that slow down your heart rate and help control your heart rhythm. On discharge:  - Follow up with your cardiologist and primary care doctor for further monitoring and recommendations 1 week after discharge.  - CONTINUE Xarelto 15 mg daily with food  - CONTINUE digoxin, metoprolol, and Cardizem      SECONDARY DISCHARGE DIAGNOSES  Diagnosis: Acute on chronic systolic congestive heart failure  Assessment and Plan of Treatment: You had some changes in your medications on admission. You also had an ultrasound of your heart while you were here, which showed moderate to severe aortic stenosis and a mild decrease in your ejection fraction. On discharge:   - Follow up with your cardiologist and primary care doctor for further monitoring and recommendations 1 week after discharge.    Diagnosis: Hypertension  Assessment and Plan of Treatment: You had some changes in your blood pressure medications. On discharge:  - Follow up with your cardiologist and primary care doctor for further monitoring and recommendations 1 week after discharge.    Diagnosis: GERD (gastroesophageal reflux disease)  Assessment and Plan of Treatment: Continue taking pantoprazole 20 mg daily.    Diagnosis: Iron deficiency anemia  Assessment and Plan of Treatment: Continue taking ferrous sulfate 325 mg daily.    Diagnosis: Dementia  Assessment and Plan of Treatment: Continue taking your home dose of memantine.     PRINCIPAL DISCHARGE DIAGNOSIS  Diagnosis: Atrial fibrillation  Assessment and Plan of Treatment: You have a history of atrial fibrillation, You came with A Fib RVR  . You were treated with medications IV Cardizem & Oral Metoprolol  that slow down your heart rate and help control your heart rhythm. On discharge:  - Follow up with your cardiologist DR Lopez in 1-2 weeks  and primary care doctor for further monitoring and recommendations 1 week after discharge.  - CONTINUE Xarelto 15 mg daily with food  - CONTINUE digoxin 125 mcg 1 tab every other day, Check Dig level in 2weeks  -Increase , Metoprolol 50 mg 1 tab q 8 hrs , and Cardizem  mg 1 tab daily      SECONDARY DISCHARGE DIAGNOSES  Diagnosis: Acute on chronic diastolic congestive heart failure  Assessment and Plan of Treatment: 2/2 Rapid A Fib R[facility] along with Severe AS   You also had an ECHO  of your heart while you were here, which showed moderate to severe aortic stenosis and a mild decrease in your 50- 55% ejection fraction. On discharge:   -Lasix 40 mg 1 tab Daily , Fluid restriction 1 lit/24 hrs   -Take K Cl Powder 1 PK every other day with Lasix, Check  K level in 2 weeks   Daily Weight   - Follow up with your cardiologist with DR Lopez  and primary care doctor for further monitoring and recommendations 1 week after discharge.    Diagnosis: Hypertension  Assessment and Plan of Treatment: You had some changes in your blood pressure medications. On discharge:  - Follow up with your cardiologist in 1 week   -On Cardizem  mg daily, Metoprolol 50 mg 1 tab q 8 hrs   -Lasix 40 mg 1 tab daily.    Diagnosis: GERD (gastroesophageal reflux disease)  Assessment and Plan of Treatment: Continue taking pantoprazole 20 mg daily.    Diagnosis: Iron deficiency anemia  Assessment and Plan of Treatment: Continue taking ferrous sulfate 325 mg daily.    Diagnosis: Dementia  Assessment and Plan of Treatment: Continue taking your home dose of memantine.daily    Diagnosis: Insomnia  Assessment and Plan of Treatment: On Ambien 10 mg 1 tab at bed time .

## 2021-11-19 NOTE — PROGRESS NOTE ADULT - PROBLEM SELECTOR PLAN 2
History of afib, presented to ER in [facility]   -on Xarelto  -S/P IV Cardizem drip, transitioned to 30 Cardizem q6h   - On tele patient is in afib with rates in 80-100s   - Continue home dose of Xarelto 15 mg daily  - On Lopressor 50 mg 1 tab q 8 hrs   - F/u TTE   - F/u thyroid panel   - Cardiology following Dr Gray History of afib, presented to ER in [facility]   -on Xarelto  -S/P IV Cardizem drip, transitioned to 30 Cardizem q6h   - On tele patient is in afib with rates in 100s   - Continue home dose of Xarelto 15 mg daily  - On Lopressor 50 mg 1 tab q 8 hrs   - start digoxin 125 mcg qod  - TTE- prelim read shows severe AS; f/u official read   - F/u thyroid panel   - Cardiology following Dr Gray

## 2021-11-19 NOTE — PROGRESS NOTE ADULT - ASSESSMENT
89 year old female with PMH afib on Xarelto, CHF, HTN, GERD, osteoarthritis, anxiety, iron deficiency anemia presents with decompensated heart failure      Severe AS,/ CHF/ HTN   continue iv lasix 40mg BID, now with slight increase in BUN/cr and soft bp, hold this pm dose   strict intake and output negative 300 cc 24hours   echo prelim with severe AS, follow up official read, given advanced age co morbidities likely not candidate for intervention   supplemental oxygen   tele with afib 100-123 bpm overnight   resume digoxin at every other day was held initially for high level (repeat level  0.2 11/18)       Monitor and replete electrolytes. Keep K>4.0 and Mg>2.0.  Liana Oneill FNP-C  Cardiology NP  [facility] [phone]           89 year old female with PMH afib on Xarelto, CHF, HTN, GERD, osteoarthritis, anxiety, iron deficiency anemia presents with decompensated heart failure      Severe AS,/ CHF/ HTN   continue iv lasix 40mg BID, now with slight increase in BUN/cr and soft bp, hold this pm dose   strict intake and output negative 300 cc 24hours   echo prelim with severe AS, follow up official read, given advanced age co morbidities may not be an ideal candidate for intervention   supplemental oxygen   tele with afib 100-123 bpm overnight   resume digoxin at every other day was held initially for high level (repeat level  0.2 11/18)       Monitor and replete electrolytes. Keep K>4.0 and Mg>2.0.  Liana Oneill FNP-C  Cardiology NP  [facility] [phone]

## 2021-11-19 NOTE — DISCHARGE NOTE PROVIDER - HOSPITAL COURSE
ADMISSION DATE:  11-17-21    ---  FROM ADMISSION H+P:   HPI:  Patient is a 89 year old female with PMH afib on Xarelto, CHF, HTN, GERD, osteoarthritis, anxiety, iron deficiency anemia who presents to the ER with complaint of SOB. She has been feeling short of breath for a few months now, but her symptoms have worsened over the past week. She has been having shortness of breath with minimal exertion, and some orthopnea, but is able to tolerate lying flat. She denies any cough, congestion, fever, chills, chest pain, abd pain, diarrhea, nausea, or LE pain. She reports being chronically constipated, and is on multiple medications to help her have BMs. Since her symptoms were exacerbated, she has been noticing palpitations. She has been hospitalized for atrial fibrillation before in 2019 in [place]. She has no further complaints or concerns at this time.     In the ED:   VS: T:99, HR: 127, BP: 128/78, RR;18, SpO2: 96% on room air   Labs were significant for Cl: 112, BUN: 25, alk phos: 147, proBNP: 4782  CXR: on personal read, pulmonary vascular congestion noted   EKG: , AFib with RVR, LAD, nonspecific ST and T wave abnormality  Patient received Lasix 40 mg IVP x 1 (17 Nov 2021 13:14)      ---  HOSPITAL COURSE/PERTINENT LABS/PROCEDURES PERFORMED/PENDING TESTS:  Patient admitted for acute on chronic CHF exacerbation and afib with RVR. Patient was evaluated by cardiology (Dr Mukherjee) who recommended diuresis with Lasix 40 mg BID. Patient was given 5 mg Lopressor x 2 for afib, started on Lopressor 50 mg TID, and started on Cardizem gtt. Eventually, patient was transitioned to PO Cardizem 30 mg q6h and started on digoxin qod. Patient had an echocardiogram done which showed----. Patient was evaluated by palliative care for GOC discussion, patient was made DNR/DNI.     PT evaluated the patient and recommended discharge home with home PT.     ---  PATIENT CONDITION:  - stable    ---  PHYSICAL EXAM ON DAY OF DISCHARGE:    ---  CONSULTANTS:   Cardiology (Dr Mukherjee)     ---  ADVANCED CARE PLANNING:  - Code status:   DNR/DNI    - [facility] completed:      [ x ] NO     [  ] YES    ---  TIME SPENT:  I, the attending physician, was physically present for the key portions of the evaluation and management (E/M) service provided. The total amount of time spent reviewing the hospital notes, laboratory values, imaging findings, assessing/counseling the patient, discussing with consultant physicians, social work, nursing staff was -- minutes ADMISSION DATE:  11-17-21    ---  FROM ADMISSION H+P:   HPI:  Patient is a 89 year old female with PMH afib on Xarelto, CHF, HTN, GERD, osteoarthritis, anxiety, iron deficiency anemia who presents to the ER with complaint of SOB. She has been feeling short of breath for a few months now, but her symptoms have worsened over the past week. She has been having shortness of breath with minimal exertion, and some orthopnea, but is able to tolerate lying flat. She denies any cough, congestion, fever, chills, chest pain, abd pain, diarrhea, nausea, or LE pain. She reports being chronically constipated, and is on multiple medications to help her have BMs. Since her symptoms were exacerbated, she has been noticing palpitations. She has been hospitalized for atrial fibrillation before in 2019 in [place]. She has no further complaints or concerns at this time.     In the ED:   VS: T:99, HR: 127, BP: 128/78, RR;18, SpO2: 96% on room air   Labs were significant for Cl: 112, BUN: 25, alk phos: 147, proBNP: 4782  CXR: on personal read, pulmonary vascular congestion noted   EKG: , AFib with RVR, LAD, nonspecific ST and T wave abnormality  Patient received Lasix 40 mg IVP x 1 (17 Nov 2021 13:14)      ---  HOSPITAL COURSE/PERTINENT LABS/PROCEDURES PERFORMED/PENDING TESTS:  Patient admitted for acute on chronic CHF exacerbation and afib with RVR. Patient was evaluated by cardiology (Dr Mukherjee) who recommended diuresis with Lasix 40 mg BID. Patient was given 5 mg Lopressor x 2 for afib, started on Lopressor 50 mg TID, and started on Cardizem gtt. Eventually, patient was transitioned to PO Cardizem 30 mg q6h and started on digoxin qod. Patient had an echocardiogram done which showed LVEF of 50-55%, and moderate to severe aortic stenosis. Patient was evaluated by palliative care for GOC discussion, patient was made DNR/DNI.     PT evaluated the patient and recommended discharge home with home PT.     ---  PATIENT CONDITION:  - stable    ---  PHYSICAL EXAM ON DAY OF DISCHARGE:    ---  CONSULTANTS:   Cardiology (Dr. Mukherjee)   Pulmonology (Dr. Gillespie)    ---  ADVANCED CARE PLANNING:  - Code status:   DNR/DNI    - [facility] completed:      [ x ] NO     [  ] YES    ---  TIME SPENT:  I, the attending physician, was physically present for the key portions of the evaluation and management (E/M) service provided. The total amount of time spent reviewing the hospital notes, laboratory values, imaging findings, assessing/counseling the patient, discussing with consultant physicians, social work, nursing staff was -- minutes ADMISSION DATE:  11-17-21    ---  FROM ADMISSION H+P:   HPI:  Patient is a 89 year old female with PMH afib on Xarelto, CHF, HTN, GERD, osteoarthritis, anxiety, iron deficiency anemia who presents to the ER with complaint of SOB. She has been feeling short of breath for a few months now, but her symptoms have worsened over the past week. She has been having shortness of breath with minimal exertion, and some orthopnea, but is able to tolerate lying flat. She denies any cough, congestion, fever, chills, chest pain, abd pain, diarrhea, nausea, or LE pain. She reports being chronically constipated, and is on multiple medications to help her have BMs. Since her symptoms were exacerbated, she has been noticing palpitations. She has been hospitalized for atrial fibrillation before in 2019 in [place]. She has no further complaints or concerns at this time.     In the ED:   VS: T:99, HR: 127, BP: 128/78, RR;18, SpO2: 96% on room air   Labs were significant for Cl: 112, BUN: 25, alk phos: 147, proBNP: 4782  CXR: on personal read, pulmonary vascular congestion noted   EKG: , AFib with RVR, LAD, nonspecific ST and T wave abnormality  Patient received Lasix 40 mg IVP x 1 (17 Nov 2021 13:14)    ---  HOSPITAL COURSE/PERTINENT LABS/PROCEDURES PERFORMED/PENDING TESTS:  Patient admitted for acute on chronic CHF exacerbation and afib with RVR. Patient was evaluated by cardiology (Dr Mukherjee) who recommended diuresis with Lasix 40 mg BID. Patient was given 5 mg Lopressor x 2 for afib, started on Lopressor 50 mg TID, and started on Cardizem gtt. Eventually, patient was transitioned to PO Cardizem 30 mg q6h and started on digoxin qod. Patient had an echocardiogram done which showed LVEF of 50-55%, and moderate to severe aortic stenosis. Patient was evaluated by palliative care for GOC discussion, patient was made DNR/DNI.     PT evaluated the patient and recommended discharge home with home PT.     ---  PATIENT CONDITION:  - stable    ---  PHYSICAL EXAM ON DAY OF DISCHARGE:    ---  CONSULTANTS:   Cardiology (Dr. Mukherjee)   Pulmonology (Dr. Gillespie)    ---  ADVANCED CARE PLANNING:  - Code status:   DNR/DNI    - [facility] completed:      [ x ] NO     [  ] YES    ---  TIME SPENT:  I, the attending physician, was physically present for the key portions of the evaluation and management (E/M) service provided. The total amount of time spent reviewing the hospital notes, laboratory values, imaging findings, assessing/counseling the patient, discussing with consultant physicians, social work, nursing staff was -- minutes ADMISSION DATE:  11-17-21    ---  FROM ADMISSION H+P:   HPI:  Patient is a 89 year old female with PMH afib on Xarelto, CHF, HTN, GERD, osteoarthritis, anxiety, iron deficiency anemia who presents to the ER with complaint of SOB. She has been feeling short of breath for a few months now, but her symptoms have worsened over the past week. She has been having shortness of breath with minimal exertion, and some orthopnea, but is able to tolerate lying flat. She denies any cough, congestion, fever, chills, chest pain, abd pain, diarrhea, nausea, or LE pain. She reports being chronically constipated, and is on multiple medications to help her have BMs. Since her symptoms were exacerbated, she has been noticing palpitations. She has been hospitalized for atrial fibrillation before in 2019 in [place]. She has no further complaints or concerns at this time.     In the ED:   VS: T:99, HR: 127, BP: 128/78, RR;18, SpO2: 96% on room air   Labs were significant for Cl: 112, BUN: 25, alk phos: 147, proBNP: 4782  CXR: on personal read, pulmonary vascular congestion noted   EKG: , AFib with RVR, LAD, nonspecific ST and T wave abnormality  Patient received Lasix 40 mg IVP x 1 (17 Nov 2021 13:14)    ---  HOSPITAL COURSE/PERTINENT LABS/PROCEDURES PERFORMED/PENDING TESTS:  Patient admitted for acute on chronic CHF exacerbation and afib with RVR. Patient was evaluated by cardiology (Dr Mukherjee) who recommended diuresis with Lasix 40 mg BID. Patient was given 5 mg Lopressor x 2 for afib, started on Lopressor 50 mg TID, and started on Cardizem gtt. Eventually, patient was transitioned to PO Cardizem 30 mg q6h and started on digoxin qod. Cardizem was eventually transitioned to once a day dosing once heart rates stabilized. Patient had an echocardiogram done which showed LVEF of 50-55%, and moderate to severe aortic stenosis. Patient was evaluated by palliative care for GOC discussion, patient was made DNR/DNI.     PT evaluated the patient and recommended discharge home with home PT.     ---  PATIENT CONDITION:  - stable    ---  PHYSICAL EXAM ON DAY OF DISCHARGE:    ---  CONSULTANTS:   Cardiology (Dr. Mukherjee)   Pulmonology (Dr. Gillespie)    ---  ADVANCED CARE PLANNING:  - Code status:   DNR/DNI    - [facility] completed:      [ x ] NO     [  ] YES    ---  TIME SPENT:  I, the attending physician, was physically present for the key portions of the evaluation and management (E/M) service provided. The total amount of time spent reviewing the hospital notes, laboratory values, imaging findings, assessing/counseling the patient, discussing with consultant physicians, social work, nursing staff was -- minutes ADMISSION DATE:  11-17-21    ---  FROM ADMISSION H+P:   HPI:  Patient is a 89 year old female with PMH afib on Xarelto, CHF, HTN, GERD, osteoarthritis, anxiety, iron deficiency anemia who presents to the ER with complaint of SOB. She has been feeling short of breath for a few months now, but her symptoms have worsened over the past week. She has been having shortness of breath with minimal exertion, and some orthopnea, but is able to tolerate lying flat. She denies any cough, congestion, fever, chills, chest pain, abd pain, diarrhea, nausea, or LE pain. She reports being chronically constipated, and is on multiple medications to help her have BMs. Since her symptoms were exacerbated, she has been noticing palpitations. She has been hospitalized for atrial fibrillation before in 2019 in [place]. She has no further complaints or concerns at this time.     In the ED:   VS: T:99, HR: 127, BP: 128/78, RR;18, SpO2: 96% on room air   Labs were significant for Cl: 112, BUN: 25, alk phos: 147, proBNP: 4782  CXR: on personal read, pulmonary vascular congestion noted   EKG: , AFib with RVR, LAD, nonspecific ST and T wave abnormality  Patient received Lasix 40 mg IVP x 1 (17 Nov 2021 13:14)    ---  HOSPITAL COURSE/PERTINENT LABS/PROCEDURES PERFORMED/PENDING TESTS:  Patient admitted for acute on chronic CHF exacerbation and afib with RVR. Patient was evaluated by cardiology (Dr Mukherjee) who recommended diuresis with Lasix 40 mg BID. Patient was given 5 mg Lopressor x 2 for afib, started on Lopressor 50 mg TID, and started on Cardizem gtt. Eventually, patient was transitioned to PO Cardizem 30 mg q6h and started on digoxin qod. Cardizem was eventually transitioned to once a day dosing once heart rates stabilized. Patient had an echocardiogram done which showed LVEF of 50-55%, and moderate to severe aortic stenosis. Patient was evaluated by palliative care for GOC discussion, patient was made DNR/DNI.    PT evaluated the patient and recommended discharge home with home PT.     ---  PATIENT CONDITION:  - stable    ---  PHYSICAL EXAM ON DAY OF DISCHARGE:    ---  CONSULTANTS:   Cardiology (Dr. Mukherjee)   Pulmonology (Dr. Gillespie)    ---  ADVANCED CARE PLANNING:  - Code status:   DNR/DNI    - [facility] completed:      [ x ] NO     [  ] YES    ---  TIME SPENT:  I, the attending physician, was physically present for the key portions of the evaluation and management (E/M) service provided. The total amount of time spent reviewing the hospital notes, laboratory values, imaging findings, assessing/counseling the patient, discussing with consultant physicians, social work, nursing staff was -- minutes ADMISSION DATE:  11-17-21    ---  FROM ADMISSION H+P:   HPI:  Patient is a 89 year old female with PMH afib on Xarelto, CHF, HTN, GERD, osteoarthritis, anxiety, iron deficiency anemia who presents to the ER with complaint of SOB. She has been feeling short of breath for a few months now, but her symptoms have worsened over the past week. She has been having shortness of breath with minimal exertion, and some orthopnea, but is able to tolerate lying flat. She denies any cough, congestion, fever, chills, chest pain, abd pain, diarrhea, nausea, or LE pain. She reports being chronically constipated, and is on multiple medications to help her have BMs. Since her symptoms were exacerbated, she has been noticing palpitations. She has been hospitalized for atrial fibrillation before in 2019 in [place]. She has no further complaints or concerns at this time.     In the ED:   VS: T:99, HR: 127, BP: 128/78, RR;18, SpO2: 96% on room air   Labs were significant for Cl: 112, BUN: 25, alk phos: 147, proBNP: 4782  CXR: on personal read, pulmonary vascular congestion noted   EKG: , AFib with RVR, LAD, nonspecific ST and T wave abnormality  Patient received Lasix 40 mg IVP x 1 (17 Nov 2021 13:14)    ---  HOSPITAL COURSE/PERTINENT LABS/PROCEDURES PERFORMED/PENDING TESTS:  Patient admitted for acute on chronic  diastolic CHF exacerbation and afib with RVR. Patient was evaluated by cardiology (Dr Mukherjee) who recommended diuresis with Lasix 40 mg IV BID. Patient was given 5 mg Lopressor x 2 for afib, started on Lopressor 50 mg TID, and started on Cardizem gtt.IV  Eventually, patient was transitioned to PO Cardizem 30 mg q6h and started on digoxin qod. HR improved , Cardizem was eventually transitioned to once a day 120 mg CD daily , dosing once heart rates stabilized. Patient had an echocardiogram done which showed LVEF of 50-55%, and moderate to severe aortic stenosis. Patient was evaluated by palliative care for GOC discussion, patient was made DNR/DNI. Lasix 40 mg 1 tab daily with Fluid restriction. Pt will follow up with DR Lopez for Possible TAVR Eval as out pt. for Mod to severe AS.    PT evaluated the patient and recommended discharge home with  out PT as per Family.    ---  PATIENT CONDITION:  - stable    ---  PHYSICAL EXAM ON DAY OF DISCHARGE:    ---  CONSULTANTS:   Cardiology (Dr. Mukherjee)   Pulmonology (Dr. Gillespie)    ---  ADVANCED CARE PLANNING:  - Code status:   DNR/DNI    - [facility] completed:      [ ] NO     [  x] YES    ---  TIME SPENT:  I, the attending physician, was physically present for the key portions of the evaluation and management (E/M) service provided. The total amount of time spent reviewing the hospital notes, laboratory values, imaging findings, assessing/counseling the patient, discussing with consultant physicians, social work, nursing staff was 60 minutes ADMISSION DATE:  11-17-21    ---  FROM ADMISSION H+P:   HPI:  Patient is a 89 year old female with PMH afib on Xarelto, CHF, HTN, GERD, osteoarthritis, anxiety, iron deficiency anemia who presents to the ER with complaint of SOB. She has been feeling short of breath for a few months now, but her symptoms have worsened over the past week. She has been having shortness of breath with minimal exertion, and some orthopnea, but is able to tolerate lying flat. She denies any cough, congestion, fever, chills, chest pain, abd pain, diarrhea, nausea, or LE pain. She reports being chronically constipated, and is on multiple medications to help her have BMs. Since her symptoms were exacerbated, she has been noticing palpitations. She has been hospitalized for atrial fibrillation before in 2019 in [place]. She has no further complaints or concerns at this time.     In the ED:   VS: T:99, HR: 127, BP: 128/78, RR;18, SpO2: 96% on room air   Labs were significant for Cl: 112, BUN: 25, alk phos: 147, proBNP: 4782  CXR: on personal read, pulmonary vascular congestion noted   EKG: , AFib with RVR, LAD, nonspecific ST and T wave abnormality  Patient received Lasix 40 mg IVP x 1 (17 Nov 2021 13:14)    ---  HOSPITAL COURSE/PERTINENT LABS/PROCEDURES PERFORMED/PENDING TESTS:  Patient admitted for acute on chronic  diastolic CHF exacerbation and afib with RVR. Patient was evaluated by cardiology (Dr Mukherjee) who recommended diuresis with Lasix 40 mg IV BID. Patient was given 5 mg Lopressor x 2 for afib, started on Lopressor 50 mg TID, and started on Cardizem gtt.IV  Eventually, patient was transitioned to PO Cardizem 30 mg q6h and started on digoxin qod. HR improved , Cardizem was eventually transitioned to once a day 120 mg CD daily , dosing once heart rates stabilized. Patient had an echocardiogram done which showed LVEF of 50-55%, and moderate to severe aortic stenosis. Patient was evaluated by palliative care for GOC discussion, patient was made DNR/DNI. Lasix 40 mg 1 tab daily with Fluid restriction. Pt will follow up with DR Lopez for Possible TAVR Eval as out pt. for Mod to severe AS.    PT evaluated the patient and recommended discharge home with  out PT as per Family.    ---  PATIENT CONDITION:  - stable    ---  PHYSICAL EXAM ON DAY OF DISCHARGE:  VITALS:   T(C): 36.7 (11-22-21 @ 12:01), Max: 36.8 (11-21-21 @ 19:17)  HR: 66 (11-22-21 @ 12:01) (66 - 110)  BP: 100/62 (11-22-21 @ 12:01) (90/62 - 104/70)  RR: 18 (11-22-21 @ 12:01) (18 - 19)  SpO2: 92% (11-22-21 @ 12:01) (90% - 94%)    GENERAL: NAD, lying in bed comfortably  HEAD:  Atraumatic, normocephalic  EYES: EOMI, PERRLA, conjunctiva and sclera clear  ENT: Moist mucous membranes  NECK: Supple, no JVD  HEART: Regular rate and rhythm, no murmurs, rubs, or gallops  LUNGS: Unlabored respirations.  Clear to auscultation bilaterally, no crackles, wheezing, or rhonchi  ABDOMEN: Soft, nontender, nondistended, +BS  EXTREMITIES: 2+ peripheral pulses bilaterally. No clubbing, cyanosis, or edema  NERVOUS SYSTEM:  A&Ox3, no focal deficits   SKIN: No rashes or lesions    ---  CONSULTANTS:   Cardiology (Dr. Mukherjee)   Pulmonology (Dr. Gillespie)    ---  ADVANCED CARE PLANNING:  - Code status:   DNR/DNI    - [facility] completed:      [ ] NO     [  x] YES    ---  TIME SPENT:  I, the attending physician, was physically present for the key portions of the evaluation and management (E/M) service provided. The total amount of time spent reviewing the hospital notes, laboratory values, imaging findings, assessing/counseling the patient, discussing with consultant physicians, social work, nursing staff was 60 minutes

## 2021-11-19 NOTE — DISCHARGE NOTE PROVIDER - CARE PROVIDER_API CALL
Bere Mccloud  Phone: (   )    -  Fax: (   )    -  Follow Up Time: 1 week    Dr. Molina  Phone: (   )    -  Fax: (   )    -  Follow Up Time: 1 week   Bere Mccloud  Phone: (   )    -  Fax: (   )    -  Follow Up Time: 1 week    Dr. Molina  Phone: (   )    -  Fax: (   )    -  Follow Up Time: 1 week    Giacomo Lopez)  Cardiovascular Disease  [address] [place] [address]  Phone: [phone]  Fax: [phone]  Follow Up Time: 1 week

## 2021-11-19 NOTE — PROGRESS NOTE ADULT - SUBJECTIVE AND OBJECTIVE BOX
Patient is a 89y old  Female who presents with a chief complaint of CHF exacerbation (19 Nov 2021 06:18)    HPI: Patient is a 89 year old female with PMH afib on Xarelto, CHF, HTN, GERD, osteoarthritis, anxiety, iron deficiency anemia who presents to the ER with complaint of SOB. She has been feeling short of breath for a few months now, but her symptoms have worsened over the past week. She has been having shortness of breath with minimal exertion, and some orthopnea, but is able to tolerate lying flat. She denies any cough, congestion, fever, chills, chest pain, abd pain, diarrhea, nausea, or LE pain. She reports being chronically constipated, and is on multiple medications to help her have BMs. Since her symptoms were exacerbated, she has been noticing palpitations. She has been hospitalized for atrial fibrillation before in 2019 in [place]. She has no further complaints or concerns at this time.     In the ED:   VS: T:99, HR: 127, BP: 128/78, RR;18, SpO2: 96% on room air   Labs were significant for Cl: 112, BUN: 25, alk phos: 147, proBNP: 4782  CXR: on personal read, pulmonary vascular congestion noted   EKG: , AFib with RVR, LAD, nonspecific ST and T wave abnormality  Patient received Lasix 40 mg IVP x 1    INTERVAL HPI:  (11/18/21)- Pt seen and examined at bedside this AM. She reports improvement in her difficulty breathing and her palpitations. She appears more comfortable today as well. She has no pain, and otherwise has no complaints or concerns. On IV Lasix , Pt has Home O2 uses PRN,  (11/19/21)- Pt seen and examined at bedside this morning. States she feels well, denies any SOB or difficulty breathing, appears comfortable in bed. Palliative evaluated patient yesterday and she is DNR/DNI. Remains on IV Lasix, mild increase in Cr noted.     OVERNIGHT EVENTS: Overnight team called to assess patient for confusion at night. Patient appeared to be sundowning however was redirectable and no interventions were needed.     Home Medications:  Ferrex 150 Plus oral capsule: 1 cap(s) orally once a day (17 Nov 2021 13:16)  furosemide 20 mg oral tablet: 1 tab(s) orally once a day (17 Nov 2021 13:16)  memantine 28 mg oral capsule, extended release: 1 cap(s) orally once a day (17 Nov 2021 13:16)  Metoprolol Tartrate 25 mg oral tablet: 2 tab(s) orally 2 times a day (17 Nov 2021 13:16)  pantoprazole 20 mg oral delayed release tablet: 1 tab(s) orally once a day (17 Nov 2021 13:16)  Vitamin D3 1250 mcg (50,000 intl units) oral capsule: 1 cap(s) orally once a week on Monday  (17 Nov 2021 13:16)  Xarelto 15 mg oral tablet: 1 tab(s) orally once a day (in the evening) (17 Nov 2021 13:16)  zolpidem 10 mg oral tablet: 1 tab(s) orally once a day (at bedtime), As Needed (17 Nov 2021 13:16)      MEDICATIONS  (STANDING):  diltiazem    Tablet 30 milliGRAM(s) Oral every 6 hours  ergocalciferol 21429 Unit(s) Oral <User Schedule>  ferrous    sulfate 325 milliGRAM(s) Oral daily  furosemide   Injectable 40 milliGRAM(s) IV Push every 12 hours  influenza  Vaccine (HIGH DOSE) 0.7 milliLiter(s) IntraMuscular once  memantine 10 milliGRAM(s) Oral two times a day  metoprolol tartrate 50 milliGRAM(s) Oral every 8 hours  pantoprazole    Tablet 20 milliGRAM(s) Oral daily  rivaroxaban 15 milliGRAM(s) Oral daily  senna 2 Tablet(s) Oral at bedtime  zolpidem 10 milliGRAM(s) Oral at bedtime    MEDICATIONS  (PRN):  acetaminophen     Tablet .. 650 milliGRAM(s) Oral every 6 hours PRN Temp greater or equal to 38C (100.4F), Mild Pain (1 - 3)      No Known Allergies    Social History:  Pt lives alone at home, receives help from an aide a few times a week for a few hours at a time to help with IADLs.  Denies EtOH, tobacco, and recreational drug use.   Uses walker to ambulate at home. (17 Nov 2021 13:14)    REVIEW OF SYSTEMS: i feel good   CONSTITUTIONAL: No fever, No chills, No Body ache, No Weaknes  EYES: No eye pain, No visual disturbances, No discharge, No Redness  ENMT: No sinus pain, No throat pain, No Congestion  NECK: No pain, No stiffness  RESPIRATORY: No cough, No wheezing, No shortness of breath  CARDIOVASCULAR: No chest pain, No palpitations  GASTROINTESTINAL: No abdominal pain, No nausea, No vomiting, No diarrhea, No constipation; [  ] BM  GENITOURINARY: No dysuria, No frequency, No urgency, No incontinence  NEUROLOGICAL: No headaches, No dizziness, No numbness  EXTREMITIES: No Swelling, No Pain, No Edema  SKIN: [ x ] No itching, burning, rashes  MUSCULOSKELETAL: No joint pain, No joint swelling; No muscle pain, No back pain, No extremity pain  PSYCHIATRIC: No depression, No anxiety, No difficulty sleeping at night  PAIN SCALE: [ x ] None  [  ] Other-  ROS Unable to obtain due to: [  ] Dementia  [  ] Lethargy  [  ] Sedated  [  ] Non verbal  REST OF REVIEW OF SYSTEMS: [ x ] Normal     Vital Signs Last 24 Hrs  T(C): 36.5 (19 Nov 2021 09:51), Max: 36.6 (18 Nov 2021 11:31)  T(F): 97.7 (19 Nov 2021 09:51), Max: 97.9 (18 Nov 2021 11:31)  HR: 77 (19 Nov 2021 09:51) (68 - 94)  BP: 101/59 (19 Nov 2021 09:51) (101/59 - 112/55)  RR: 18 (19 Nov 2021 09:51) (18 - 19)  SpO2: 92% (19 Nov 2021 09:51) (92% - 94%)    CAPILLARY BLOOD GLUCOSE      I&O's Summary    18 Nov 2021 07:01  -  19 Nov 2021 07:00  --------------------------------------------------------  IN: 300 mL / OUT: 600 mL / NET: -300 mL      PHYSICAL EXAM:  GENERAL:  [ x ] NAD, [ x ] Well appearing, [  ] Agitated, [  ] Drowsy, [  ] Lethargy, [  ] Confused   HEAD:  [ x ] Normal, [  ] Other  EYES:  [ x ] EOMI, [ x ] PERRLA, [ x ] Conjunctiva and sclera clear normal, [  ] Other, [  ] Pallor, [  ] Discharge  ENMT:  [ x ] Normal, [ x ] Moist mucous membranes, [ x ] Good dentition, [ x ] No thrush  NECK:  [ x ] Supple, [ x ] No JVD, [x  ] Normal thyroid, [  ] Lymphadenopathy, [  ] Other  CHEST/LUNG:  [  ] Clear to auscultation bilaterally, [ x ] Breath Sounds equal B/L / decreased, [  ] Poor effort, [ x ] Rales bases  B/L -lung fields, [ x ] No rhonchi, [ x ] No wheezing  HEART:  [  ] Regular rate and rhythm, [  ] Tachycardia, [  ] Bradycardia, [ x ] Irregular, [ x ] No rubs, No gallops, [ ] Systolic murmur sternal border, [  ] PPM in place (Mfr:  )  ABDOMEN:  [ x ] Soft, [ x ] Nontender, [ x ] Nondistended, [ x ] No mass, [ x ] Bowel sounds present, [  ] Obese  NERVOUS SYSTEM:  [ x ] Alert & Oriented x3, [ x ] Nonfocal, [  ] Confusion, [  ] Encephalopathic, [  ] Sedated, [  ] Unable to assess, [  ] Dementia, [  ] Other-  EXTREMITIES:  [ x ] 2+ Peripheral Pulses, No clubbing, No cyanosis,  [ x ] Edema B/L lower EXT, [  ] PVD stasis skin changes B/L lower EXT, [  ] Wound  LYMPH:  No lymphadenopathy noted  SKIN:  [ x ] No rashes or lesions, [  ] Pressure ulcers, [  ] Ecchymosis, [  ] Skin tears, [  ] Other    DIET: Diet, DASH/TLC:   Sodium & Cholesterol Restricted  1000mL Fluid Restriction ([id number]) (11-17-21 @ 16:02)      LABS:                        13.5   7.50  )-----------( 230      ( 19 Nov 2021 08:02 )             41.4     19 Nov 2021 08:02    144    |  109    |  26     ----------------------------<  115    4.5     |  25     |  1.30     Ca    9.1        19 Nov 2021 08:02  Phos  3.4       19 Nov 2021 08:02  Mg     2.4       19 Nov 2021 08:02      PT/INR - ( 17 Nov 2021 11:31 )   PT: 13.1 sec;   INR: 1.13 ratio         PTT - ( 17 Nov 2021 11:31 )  PTT:31.0 sec        CARDIAC MARKERS ( 17 Nov 2021 11:31 )  x     / x     / x     / x     / 1.6 ng/mL      Anemia Panel:      Thyroid Panel:      Serum Pro-Brain Natriuretic Peptide: 4782 pg/mL (11-17-21 @ 11:31)      RADIOLOGY & ADDITIONAL TESTS:      HEALTH ISSUES - PROBLEM Dx:  Acute exacerbation of CHF (congestive heart failure)    Atrial fibrillation    Hypertension    GERD (gastroesophageal reflux disease)    Dementia    Iron deficiency anemia    Osteoarthritis    Need for prophylactic measure      Consultant(s) Notes Reviewed:  [ x ] YES     Care Discussed with [ x ] Consultants, [ x ] Patient, [  ] Family, [  ] HCP, [  ] , [  ] Social Service, [x  ] RN, [  ] Physical Therapy, [  ] Palliative Care Team  DVT PPX: [  ] Lovenox, [  ] SC Heparin, [  ] Coumadin, [ x ] Xarelto, [  ] Eliquis, [  ] Pradaxa, [  ] IV Heparin drip, [  ] SCD, [  ] Ambulation, [  ] Contraindicated 2/2 GI Bleed, [  ] Contraindicated 2/2  Bleed, [  ] Contraindicated 2/2 Brain Bleed  Advanced Directive: [ x ] None, [ x] DNR/DNI   Patient is a 89y old  Female who presents with a chief complaint of CHF exacerbation (19 Nov 2021 06:18)    HPI: Patient is a 89 year old female with PMH afib on Xarelto, CHF, HTN, GERD, osteoarthritis, anxiety, iron deficiency anemia who presents to the ER with complaint of SOB. She has been feeling short of breath for a few months now, but her symptoms have worsened over the past week. She has been having shortness of breath with minimal exertion, and some orthopnea, but is able to tolerate lying flat. She denies any cough, congestion, fever, chills, chest pain, abd pain, diarrhea, nausea, or LE pain. She reports being chronically constipated, and is on multiple medications to help her have BMs. Since her symptoms were exacerbated, she has been noticing palpitations. She has been hospitalized for atrial fibrillation before in 2019 in [place]. She has no further complaints or concerns at this time.     In the ED:   VS: T:99, HR: 127, BP: 128/78, RR;18, SpO2: 96% on room air   Labs were significant for Cl: 112, BUN: 25, alk phos: 147, proBNP: 4782  CXR: on personal read, pulmonary vascular congestion noted   EKG: , AFib with RVR, LAD, nonspecific ST and T wave abnormality  Patient received Lasix 40 mg IVP x 1    INTERVAL HPI:  (11/18/21)- Pt seen and examined at bedside this AM. She reports improvement in her difficulty breathing and her palpitations. She appears more comfortable today as well. She has no pain, and otherwise has no complaints or concerns. On IV Lasix , Pt has Home O2 uses PRN,  (11/19/21)- Pt seen and examined at bedside this morning. States she feels well, denies any SOB or difficulty breathing, appears comfortable in bed. Palliative evaluated patient yesterday and she is DNR/DNI. Remains on IV Lasix, mild increase in Cr noted. Will hold PM dose today. Remains in afib with rates in the 100s. Started on digoxin qod as per cardio recommendations.     OVERNIGHT EVENTS: Overnight team called to assess patient for confusion at night. Patient appeared to be sundowning however was redirectable and no interventions were needed.     Home Medications:  Ferrex 150 Plus oral capsule: 1 cap(s) orally once a day (17 Nov 2021 13:16)  furosemide 20 mg oral tablet: 1 tab(s) orally once a day (17 Nov 2021 13:16)  memantine 28 mg oral capsule, extended release: 1 cap(s) orally once a day (17 Nov 2021 13:16)  Metoprolol Tartrate 25 mg oral tablet: 2 tab(s) orally 2 times a day (17 Nov 2021 13:16)  pantoprazole 20 mg oral delayed release tablet: 1 tab(s) orally once a day (17 Nov 2021 13:16)  Vitamin D3 1250 mcg (50,000 intl units) oral capsule: 1 cap(s) orally once a week on Monday  (17 Nov 2021 13:16)  Xarelto 15 mg oral tablet: 1 tab(s) orally once a day (in the evening) (17 Nov 2021 13:16)  zolpidem 10 mg oral tablet: 1 tab(s) orally once a day (at bedtime), As Needed (17 Nov 2021 13:16)      MEDICATIONS  (STANDING):  diltiazem    Tablet 30 milliGRAM(s) Oral every 6 hours  ergocalciferol 84466 Unit(s) Oral <User Schedule>  ferrous    sulfate 325 milliGRAM(s) Oral daily  furosemide   Injectable 40 milliGRAM(s) IV Push every 12 hours  influenza  Vaccine (HIGH DOSE) 0.7 milliLiter(s) IntraMuscular once  memantine 10 milliGRAM(s) Oral two times a day  metoprolol tartrate 50 milliGRAM(s) Oral every 8 hours  pantoprazole    Tablet 20 milliGRAM(s) Oral daily  rivaroxaban 15 milliGRAM(s) Oral daily  senna 2 Tablet(s) Oral at bedtime  zolpidem 10 milliGRAM(s) Oral at bedtime    MEDICATIONS  (PRN):  acetaminophen     Tablet .. 650 milliGRAM(s) Oral every 6 hours PRN Temp greater or equal to 38C (100.4F), Mild Pain (1 - 3)      No Known Allergies    Social History:  Pt lives alone at home, receives help from an aide a few times a week for a few hours at a time to help with IADLs.  Denies EtOH, tobacco, and recreational drug use.   Uses walker to ambulate at home. (17 Nov 2021 13:14)    REVIEW OF SYSTEMS: i feel good   CONSTITUTIONAL: No fever, No chills, No Body ache, No Weaknes  EYES: No eye pain, No visual disturbances, No discharge, No Redness  ENMT: No sinus pain, No throat pain, No Congestion  NECK: No pain, No stiffness  RESPIRATORY: No cough, No wheezing, No shortness of breath  CARDIOVASCULAR: No chest pain, No palpitations  GASTROINTESTINAL: No abdominal pain, No nausea, No vomiting, No diarrhea, No constipation; [  ] BM  GENITOURINARY: No dysuria, No frequency, No urgency, No incontinence  NEUROLOGICAL: No headaches, No dizziness, No numbness  EXTREMITIES: No Swelling, No Pain, No Edema  SKIN: [ x ] No itching, burning, rashes  MUSCULOSKELETAL: No joint pain, No joint swelling; No muscle pain, No back pain, No extremity pain  PSYCHIATRIC: No depression, No anxiety, No difficulty sleeping at night  PAIN SCALE: [ x ] None  [  ] Other-  ROS Unable to obtain due to: [  ] Dementia  [  ] Lethargy  [  ] Sedated  [  ] Non verbal  REST OF REVIEW OF SYSTEMS: [ x ] Normal     Vital Signs Last 24 Hrs  T(C): 36.5 (19 Nov 2021 09:51), Max: 36.6 (18 Nov 2021 11:31)  T(F): 97.7 (19 Nov 2021 09:51), Max: 97.9 (18 Nov 2021 11:31)  HR: 77 (19 Nov 2021 09:51) (68 - 94)  BP: 101/59 (19 Nov 2021 09:51) (101/59 - 112/55)  RR: 18 (19 Nov 2021 09:51) (18 - 19)  SpO2: 92% (19 Nov 2021 09:51) (92% - 94%)    CAPILLARY BLOOD GLUCOSE      I&O's Summary    18 Nov 2021 07:01  -  19 Nov 2021 07:00  --------------------------------------------------------  IN: 300 mL / OUT: 600 mL / NET: -300 mL      PHYSICAL EXAM:  GENERAL:  [ x ] NAD, [ x ] Well appearing, [  ] Agitated, [  ] Drowsy, [  ] Lethargy, [  ] Confused   HEAD:  [ x ] Normal, [  ] Other  EYES:  [ x ] EOMI, [ x ] PERRLA, [ x ] Conjunctiva and sclera clear normal, [  ] Other, [  ] Pallor, [  ] Discharge  ENMT:  [ x ] Normal, [ x ] Moist mucous membranes, [ x ] Good dentition, [ x ] No thrush  NECK:  [ x ] Supple, [ x ] No JVD, [x  ] Normal thyroid, [  ] Lymphadenopathy, [  ] Other  CHEST/LUNG:  [  ] Clear to auscultation bilaterally, [ x ] Breath Sounds equal B/L / decreased, [  ] Poor effort, [ x ] Rales bases  B/L -lung fields, [ x ] No rhonchi, [ x ] No wheezing  HEART:  [  ] Regular rate and rhythm, [  ] Tachycardia, [  ] Bradycardia, [ x ] Irregular, [ x ] No rubs, No gallops, [ x ] Systolic murmur sternal border, [  ] PPM in place (Mfr:  )  ABDOMEN:  [ x ] Soft, [ x ] Nontender, [ x ] Nondistended, [ x ] No mass, [ x ] Bowel sounds present, [  ] Obese  NERVOUS SYSTEM:  [ x ] Alert & Oriented x3, [ x ] Nonfocal, [  ] Confusion, [  ] Encephalopathic, [  ] Sedated, [  ] Unable to assess, [  ] Dementia, [  ] Other-  EXTREMITIES:  [ x ] 2+ Peripheral Pulses, No clubbing, No cyanosis,  [ x ] Edema B/L lower EXT, [  ] PVD stasis skin changes B/L lower EXT, [  ] Wound  LYMPH:  No lymphadenopathy noted  SKIN:  [ x ] No rashes or lesions, [  ] Pressure ulcers, [  ] Ecchymosis, [  ] Skin tears, [  ] Other    DIET: Diet, DASH/TLC:   Sodium & Cholesterol Restricted  1000mL Fluid Restriction ([id number]) (11-17-21 @ 16:02)      LABS:                        13.5   7.50  )-----------( 230      ( 19 Nov 2021 08:02 )             41.4     19 Nov 2021 08:02    144    |  109    |  26     ----------------------------<  115    4.5     |  25     |  1.30     Ca    9.1        19 Nov 2021 08:02  Phos  3.4       19 Nov 2021 08:02  Mg     2.4       19 Nov 2021 08:02      PT/INR - ( 17 Nov 2021 11:31 )   PT: 13.1 sec;   INR: 1.13 ratio         PTT - ( 17 Nov 2021 11:31 )  PTT:31.0 sec        CARDIAC MARKERS ( 17 Nov 2021 11:31 )  x     / x     / x     / x     / 1.6 ng/mL      Anemia Panel:      Thyroid Panel:      Serum Pro-Brain Natriuretic Peptide: 4782 pg/mL (11-17-21 @ 11:31)      RADIOLOGY & ADDITIONAL TESTS:      HEALTH ISSUES - PROBLEM Dx:  Acute exacerbation of CHF (congestive heart failure)    Atrial fibrillation    Hypertension    GERD (gastroesophageal reflux disease)    Dementia    Iron deficiency anemia    Osteoarthritis    Need for prophylactic measure      Consultant(s) Notes Reviewed:  [ x ] YES     Care Discussed with [ x ] Consultants, [ x ] Patient, [  ] Family, [  ] HCP, [  ] , [  ] Social Service, [x  ] RN, [  ] Physical Therapy, [  ] Palliative Care Team  DVT PPX: [  ] Lovenox, [  ] SC Heparin, [  ] Coumadin, [ x ] Xarelto, [  ] Eliquis, [  ] Pradaxa, [  ] IV Heparin drip, [  ] SCD, [  ] Ambulation, [  ] Contraindicated 2/2 GI Bleed, [  ] Contraindicated 2/2  Bleed, [  ] Contraindicated 2/2 Brain Bleed  Advanced Directive: [ x ] None, [ x] DNR/DNI   Patient is a 89y old  Female who presents with a chief complaint of CHF exacerbation (19 Nov 2021 06:18)    HPI: Patient is a 89 year old female with PMH afib on Xarelto, CHF, HTN, GERD, osteoarthritis, anxiety, iron deficiency anemia who presents to the ER with complaint of SOB. She has been feeling short of breath for a few months now, but her symptoms have worsened over the past week. She has been having shortness of breath with minimal exertion, and some orthopnea, but is able to tolerate lying flat. She denies any cough, congestion, fever, chills, chest pain, abd pain, diarrhea, nausea, or LE pain. She reports being chronically constipated, and is on multiple medications to help her have BMs. Since her symptoms were exacerbated, she has been noticing palpitations. She has been hospitalized for atrial fibrillation before in 2019 in [place]. She has no further complaints or concerns at this time.     In the ED:   VS: T:99, HR: 127, BP: 128/78, RR;18, SpO2: 96% on room air   Labs were significant for Cl: 112, BUN: 25, alk phos: 147, proBNP: 4782  CXR: on personal read, pulmonary vascular congestion noted   EKG: , AFib with RVR, LAD, nonspecific ST and T wave abnormality  Patient received Lasix 40 mg IVP x 1    INTERVAL HPI:  (11/18/21)- Pt seen and examined at bedside this AM. She reports improvement in her difficulty breathing and her palpitations. She appears more comfortable today as well. She has no pain, and otherwise has no complaints or concerns. On IV Lasix , Pt has Home O2 uses PRN,  (11/19/21)- Pt seen and examined at bedside this morning. States she feels well, denies any SOB or difficulty breathing, appears comfortable in bed. Palliative evaluated patient yesterday and she is DNR/DNI. Remains on IV Lasix, mild increase in Cr noted. Will hold PM dose today. Remains in afib with rates in the 100s. Started on digoxin qod as per cardio recommendations.     OVERNIGHT EVENTS: Overnight team called to assess patient for confusion at night. Patient appeared to be sundowning however was redirectable and no interventions were needed.     Home Medications:  Ferrex 150 Plus oral capsule: 1 cap(s) orally once a day (17 Nov 2021 13:16)  furosemide 20 mg oral tablet: 1 tab(s) orally once a day (17 Nov 2021 13:16)  memantine 28 mg oral capsule, extended release: 1 cap(s) orally once a day (17 Nov 2021 13:16)  Metoprolol Tartrate 25 mg oral tablet: 2 tab(s) orally 2 times a day (17 Nov 2021 13:16)  pantoprazole 20 mg oral delayed release tablet: 1 tab(s) orally once a day (17 Nov 2021 13:16)  Vitamin D3 1250 mcg (50,000 intl units) oral capsule: 1 cap(s) orally once a week on Monday  (17 Nov 2021 13:16)  Xarelto 15 mg oral tablet: 1 tab(s) orally once a day (in the evening) (17 Nov 2021 13:16)  zolpidem 10 mg oral tablet: 1 tab(s) orally once a day (at bedtime), As Needed (17 Nov 2021 13:16)      MEDICATIONS  (STANDING):  diltiazem    Tablet 30 milliGRAM(s) Oral every 6 hours  ergocalciferol 65766 Unit(s) Oral <User Schedule>  ferrous    sulfate 325 milliGRAM(s) Oral daily  furosemide   Injectable 40 milliGRAM(s) IV Push every 12 hours  influenza  Vaccine (HIGH DOSE) 0.7 milliLiter(s) IntraMuscular once  memantine 10 milliGRAM(s) Oral two times a day  metoprolol tartrate 50 milliGRAM(s) Oral every 8 hours  pantoprazole    Tablet 20 milliGRAM(s) Oral daily  rivaroxaban 15 milliGRAM(s) Oral daily  senna 2 Tablet(s) Oral at bedtime  zolpidem 10 milliGRAM(s) Oral at bedtime    MEDICATIONS  (PRN):  acetaminophen     Tablet .. 650 milliGRAM(s) Oral every 6 hours PRN Temp greater or equal to 38C (100.4F), Mild Pain (1 - 3)      No Known Allergies    Social History:  Pt lives alone at home, receives help from an aide a few times a week for a few hours at a time to help with IADLs.  Denies EtOH, tobacco, and recreational drug use.   Uses walker to ambulate at home. (17 Nov 2021 13:14)    REVIEW OF SYSTEMS: i feel good   CONSTITUTIONAL: No fever, No chills, No Body ache, No Weaknes  EYES: No eye pain, No visual disturbances, No discharge, No Redness  ENMT: No sinus pain, No throat pain, No Congestion  NECK: No pain, No stiffness  RESPIRATORY: No cough, No wheezing, No shortness of breath  CARDIOVASCULAR: No chest pain, No palpitations  GASTROINTESTINAL: No abdominal pain, No nausea, No vomiting, No diarrhea, No constipation; [  ] BM  GENITOURINARY: No dysuria, No frequency, No urgency, No incontinence  NEUROLOGICAL: No headaches, No dizziness, No numbness  EXTREMITIES: No Swelling, No Pain, No Edema  SKIN: [ x ] No itching, burning, rashes  MUSCULOSKELETAL: No joint pain, No joint swelling; No muscle pain, No back pain, No extremity pain  PSYCHIATRIC: No depression, No anxiety, No difficulty sleeping at night  PAIN SCALE: [ x ] None  [  ] Other-  ROS Unable to obtain due to: [  ] Dementia  [  ] Lethargy  [  ] Sedated  [  ] Non verbal  REST OF REVIEW OF SYSTEMS: [ x ] Normal     Vital Signs Last 24 Hrs  T(C): 36.5 (19 Nov 2021 09:51), Max: 36.6 (18 Nov 2021 11:31)  T(F): 97.7 (19 Nov 2021 09:51), Max: 97.9 (18 Nov 2021 11:31)  HR: 77 (19 Nov 2021 09:51) (68 - 94)  BP: 101/59 (19 Nov 2021 09:51) (101/59 - 112/55)  RR: 18 (19 Nov 2021 09:51) (18 - 19)  SpO2: 92% (19 Nov 2021 09:51) (92% - 94%)    CAPILLARY BLOOD GLUCOSE      I&O's Summary    18 Nov 2021 07:01  -  19 Nov 2021 07:00  --------------------------------------------------------  IN: 300 mL / OUT: 600 mL / NET: -300 mL      PHYSICAL EXAM:  GENERAL:  [ x ] NAD, [ x ] Well appearing, [  ] Agitated, [  ] Drowsy, [  ] Lethargy, [  ] Confused   HEAD:  [ x ] Normal, [  ] Other  EYES:  [ x ] EOMI, [ x ] PERRLA, [ x ] Conjunctiva and sclera clear normal, [  ] Other, [  ] Pallor, [  ] Discharge  ENMT:  [ x ] Normal, [ x ] Moist mucous membranes, [ x ] Good dentition, [ x ] No thrush  NECK:  [ x ] Supple, [ x ] No JVD, [x  ] Normal thyroid, [  ] Lymphadenopathy, [  ] Other  CHEST/LUNG:  [  ] Clear to auscultation bilaterally, [ x ] Breath Sounds equal B/L / decreased, [  ] Poor effort, [ x ] Rales bases  B/L -lung fields, [ x ] No rhonchi, [ x ] No wheezing  HEART:  [  ] Regular rate and rhythm, [  ] Tachycardia, [  ] Bradycardia, [ x ] Irregular, [ x ] No rubs, No gallops, [ x ] Systolic murmur sternal border, [  ] PPM in place (Mfr:  )  ABDOMEN:  [ x ] Soft, [ x ] Nontender, [ x ] Nondistended, [ x ] No mass, [ x ] Bowel sounds present, [  ] Obese  NERVOUS SYSTEM:  [ x ] Alert & Oriented x3, [ x ] Nonfocal, [  ] Confusion, [  ] Encephalopathic, [  ] Sedated, [  ] Unable to assess, [  ] Dementia, [  ] Other-  EXTREMITIES:  [ x ] 2+ Peripheral Pulses, No clubbing, No cyanosis,  [ x ] Edema B/L lower EXT, [  ] PVD stasis skin changes B/L lower EXT, [  ] Wound  LYMPH:  No lymphadenopathy noted  SKIN:  [ x ] No rashes or lesions, [  ] Pressure ulcers, [  ] Ecchymosis, [  ] Skin tears, [  ] Other    DIET: Diet, DASH/TLC:   Sodium & Cholesterol Restricted  1000mL Fluid Restriction ([id number]) (11-17-21 @ 16:02)      LABS:                        13.5   7.50  )-----------( 230      ( 19 Nov 2021 08:02 )             41.4     19 Nov 2021 08:02    144    |  109    |  26     ----------------------------<  115    4.5     |  25     |  1.30     Ca    9.1        19 Nov 2021 08:02  Phos  3.4       19 Nov 2021 08:02  Mg     2.4       19 Nov 2021 08:02      PT/INR - ( 17 Nov 2021 11:31 )   PT: 13.1 sec;   INR: 1.13 ratio         PTT - ( 17 Nov 2021 11:31 )  PTT:31.0 sec        CARDIAC MARKERS ( 17 Nov 2021 11:31 )  x     / x     / x     / x     / 1.6 ng/mL      Anemia Panel:      Thyroid Panel:      Serum Pro-Brain Natriuretic Peptide: 4782 pg/mL (11-17-21 @ 11:31)      RADIOLOGY & ADDITIONAL TESTS:      HEALTH ISSUES - PROBLEM Dx:  Acute exacerbation of CHF (congestive heart failure)    Atrial fibrillation    Hypertension    GERD (gastroesophageal reflux disease)    Dementia    Iron deficiency anemia    Osteoarthritis    Need for prophylactic measure      Consultant(s) Notes Reviewed:  [ x ] YES     Care Discussed with [ x ] Consultants, [ x ] Patient, [  ] Family, [  ] HCP, [  ] , [  ] Social Service, [x ] RN, [  ] Physical Therapy, [  ] Palliative Care Team  DVT PPX: [  ] Lovenox, [  ] SC Heparin, [  ] Coumadin, [ x ] Xarelto, [  ] Eliquis, [  ] Pradaxa, [  ] IV Heparin drip, [  ] SCD, [  ] Ambulation, [  ] Contraindicated 2/2 GI Bleed, [  ] Contraindicated 2/2  Bleed, [  ] Contraindicated 2/2 Brain Bleed  Advanced Directive: [ x ] None, [ x] DNR/DNI

## 2021-11-19 NOTE — DISCHARGE NOTE PROVIDER - NSDCACTIVITY_GEN_ALL_CORE
Do not drive or operate machinery/Do not make important decisions/No heavy lifting/straining Bathing allowed/Do not drive or operate machinery/Do not make important decisions/Walking - Indoors allowed/No heavy lifting/straining

## 2021-11-19 NOTE — PROGRESS NOTE ADULT - PROBLEM SELECTOR PLAN 1
- acute on chronic CHF exacerbation  2/2 Rapid AFib, RVR  - tele- patient remains in afib with rates in 80-100s   - Lasix 40 mg IVP BID, Fluid restriction 1200 ml/.24 hrs. Slight increase in Cr noted this morning.---    - Continue beta blocker- metoprolol 50 mg q 8 hrs   -  TTE ordered; f/u results   - Strict I&Os, daily weights   - Cardio (Dr osei's group consulted; f/u recommendations, - acute on chronic CHF exacerbation  2/2 Rapid AFib, RVR  - tele- patient remains in afib with rates in 80-100s   - Lasix 40 mg IVP BID, Fluid restriction 1200 ml/.24 hrs. Slight increase in Cr noted this morning. Will hold PM dose today and resume in AM if kidney function is stable.    - Continue beta blocker- metoprolol 50 mg q 8 hrs   -  TTE ordered; prelim read shows severe AS, f/u official read   - Strict I&Os, daily weights   - Cardio (Dr osei's group consulted; f/u recommendations, - acute on chronic CHF exacerbation  2/2 Rapid AFib, RVR  - tele- patient remains in afib with rates in 80-100s   - Lasix 40 mg IVP BID, Fluid restriction 1200 ml/.24 hrs. Slight increase in Cr noted this morning. Will hold PM dose today and resume in AM if kidney function is stable.    - Continue beta blocker- metoprolol 50 mg q 8 hrs   -  TTE ordered; prelim read shows severe AS, f/u official read -ECHO- EF 50-55%  - Strict I&Os, daily weights   - Cardio (Dr osei's group consulted; f/u recommendations,

## 2021-11-19 NOTE — PROGRESS NOTE ADULT - SUBJECTIVE AND OBJECTIVE BOX
[facility] -- Sage Monzon, Isaac, Jessica, Lonny Alegre Savella  Office # [phone]      Follow Up:    afib with rvr   Subjective/Observations:   Seen at bedside remains on nasal cannula. offers no complaints. dr Dean discussed plan of care at length with family over phone     REVIEW OF SYSTEMS: All other review of systems is negative unless indicated above    PAST MEDICAL & SURGICAL HISTORY:  Chronic atrial fibrillation    CHF, chronic    Hypertension    Osteoarthritis    Dementia    GERD (gastroesophageal reflux disease)    Iron deficiency anemia    History of cholecystectomy        MEDICATIONS  (STANDING):  diltiazem    Tablet 30 milliGRAM(s) Oral every 6 hours  ergocalciferol 18605 Unit(s) Oral <User Schedule>  ferrous    sulfate 325 milliGRAM(s) Oral daily  furosemide   Injectable 40 milliGRAM(s) IV Push every 12 hours  influenza  Vaccine (HIGH DOSE) 0.7 milliLiter(s) IntraMuscular once  memantine 10 milliGRAM(s) Oral two times a day  metoprolol tartrate 50 milliGRAM(s) Oral every 8 hours  pantoprazole    Tablet 20 milliGRAM(s) Oral daily  rivaroxaban 15 milliGRAM(s) Oral daily  senna 2 Tablet(s) Oral at bedtime  zolpidem 10 milliGRAM(s) Oral at bedtime    MEDICATIONS  (PRN):  acetaminophen     Tablet .. 650 milliGRAM(s) Oral every 6 hours PRN Temp greater or equal to 38C (100.4F), Mild Pain (1 - 3)      Allergies    No Known Allergies    Intolerances        Vital Signs Last 24 Hrs  T(C): 36.5 (19 Nov 2021 09:51), Max: 36.5 (19 Nov 2021 04:10)  T(F): 97.7 (19 Nov 2021 09:51), Max: 97.7 (19 Nov 2021 04:10)  HR: 62 (19 Nov 2021 12:00) (62 - 94)  BP: 94/59 (19 Nov 2021 12:00) (94/59 - 111/71)  BP(mean): --  RR: 18 (19 Nov 2021 12:00) (18 - 19)  SpO2: 95% (19 Nov 2021 12:00) (92% - 95%)    I&O's Summary    18 Nov 2021 07:01  -  19 Nov 2021 07:00  --------------------------------------------------------  IN: 300 mL / OUT: 600 mL / NET: -300 mL          PHYSICAL EXAM:  TELE: Afib 123   Constitutional: NAD, awake and alert, well-developed  HEENT: Moist Mucous Membranes, Anicteric  Pulmonary: Non-labored, breath sounds are dim bases  Cardiovascular:IRRegular, S1 and S2 nl, + murmur   Gastrointestinal: Bowel Sounds present, soft, nontender.   Lymph: trace peripheral edema.  Skin: No visible rashes or ulcers.  Psych:  Mood & affect appropriate    LABS: All Labs Reviewed:                        13.5   7.50  )-----------( 230      ( 19 Nov 2021 08:02 )             41.4                         12.1   5.48  )-----------( 211      ( 18 Nov 2021 06:53 )             38.1                         12.0   7.87  )-----------( 212      ( 17 Nov 2021 11:31 )             36.5     19 Nov 2021 08:02    144    |  109    |  26     ----------------------------<  115    4.5     |  25     |  1.30   18 Nov 2021 06:53    145    |  106    |  20     ----------------------------<  98     3.1     |  29     |  0.91   17 Nov 2021 11:31    145    |  112    |  25     ----------------------------<  98     4.1     |  21     |  0.96     Ca    9.1        19 Nov 2021 08:02  Ca    8.6        18 Nov 2021 06:53  Ca    8.8        17 Nov 2021 11:31  Phos  3.4       19 Nov 2021 08:02  Mg     2.4       19 Nov 2021 08:02    TPro  6.4    /  Alb  3.1    /  TBili  0.6    /  DBili  x      /  AST  22     /  ALT  50     /  AlkPhos  141    18 Nov 2021 06:53  TPro  6.3    /  Alb  3.0    /  TBili  0.5    /  DBili  x      /  AST  24     /  ALT  58     /  AlkPhos  147    17 Nov 2021 11:31          12 Lead ECG:   Ventricular Rate 129 BPM    Atrial Rate 138 BPM    QRS Duration 110 ms    Q-T Interval 268 ms    QTC Calculation(Bazett) 392 ms    R Axis -40 degrees    T Axis 161 degrees    Diagnosis Line Atrial fibrillation with rapid ventricular response  Left axis deviation  ST & T wave abnormality, consider anterior ischemia  Abnormal ECG  No previous ECGs available  Confirmed by JONATAN DUNN ([id number]) on 11/17/2021 6:45:34 PM (11-17-21 @ 13:00)    < from: Xray Chest 1 View AP/PA (11.17.21 @ 11:59) >    EXAM:  XR CHEST AP OR PA 1V                            PROCEDURE DATE:  11/17/2021          INTERPRETATION:  Portable chest radiograph    CLINICAL INFORMATION: Dyspnea, shortness of breath.    TECHNIQUE:  Portable  AP view of the chest.    COMPARISON: No previous examinations are available for review.    FINDINGS:    Ill-defined LEFT diaphragmatic contour concerning for LEFT basilar airspace disease and/or effusion. Remaining of visualized lung parenchyma grossly clear.   No pneumothorax.    The  heart is enlarged in transverse diameter. No hilar mass.    Visualized osseous structures are intact.    IMPRESSION: Cardiomegaly  Suspect LEFT basilar airspace disease and/or effusion obscuring LEFT diaphragm contour..    --- End of Report ---

## 2021-11-19 NOTE — CHART NOTE - NSCHARTNOTEFT_GEN_A_CORE
Called by RN @0650 for an episode of 6 beats of wide QRS vtach. Patient is asymptomatic at this time. Patient is hemodynamically stable at this time.     Plan:  - Hx of Afib, rate controlled with digoxin and metoprolol  - Patient is hemodynamically stable at this time.   - Mg and P in AM  - Will continue to monitor, RN to call if any changes Called by RN @0650 for an episode of 6 beats of wide QRS vtach. Patient is asymptomatic at this time. Patient is hemodynamically stable at this time.     T(C): 36.6 (19 Nov 2021 16:52), Max: 36.6 (19 Nov 2021 16:52)  T(F): 97.8 (19 Nov 2021 16:52), Max: 97.8 (19 Nov 2021 16:52)  HR: 108 (19 Nov 2021 18:51) (62 - 110)  BP: 105/66 (19 Nov 2021 16:52) (94/59 - 111/71)  BP(mean): --  RR: 16 (19 Nov 2021 16:52) (16 - 18)  SpO2: 93% (19 Nov 2021 16:52) (92% - 95%)    Plan:  - Hx of Afib, rate controlled w/ metoprolol. Digoxin started today.  - Patient is hemodynamically stable at this time.   - Mg and P in AM  - cardio following  - Will continue to monitor, RN to call if any changes Called by RN @0650 for an episode of 6 beats of wide QRS vtach. Patient is asymptomatic at this time. Patient is hemodynamically stable at this time.     T(C): 36.6 (19 Nov 2021 16:52), Max: 36.6 (19 Nov 2021 16:52)  T(F): 97.8 (19 Nov 2021 16:52), Max: 97.8 (19 Nov 2021 16:52)  HR: 108 (19 Nov 2021 18:51) (62 - 110)  BP: 110/62 (19 Nov 2021 18:45) (94/59 - 111/71)  BP(mean): --  RR: 16 (19 Nov 2021 18:45) (16 - 18)  SpO2: 90% (19 Nov 2021 18:45) (90% - 95%)    Plan:  - Hx of Afib, rate controlled w/ metoprolol. Digoxin started today.  - Patient is hemodynamically stable at this time.   - Mg and P in AM  - cardio following  - Will continue to monitor, RN to call if any changes Called by RN @0635 for an episode of 6 beats of wide QRS vtach. Patient is asymptomatic at this time. Patient is hemodynamically stable at this time.     T(C): 36.6 (19 Nov 2021 16:52), Max: 36.6 (19 Nov 2021 16:52)  T(F): 97.8 (19 Nov 2021 16:52), Max: 97.8 (19 Nov 2021 16:52)  HR: 108 (19 Nov 2021 18:51) (62 - 110)  BP: 110/62 (19 Nov 2021 18:45) (94/59 - 111/71)  BP(mean): --  RR: 16 (19 Nov 2021 18:45) (16 - 18)  SpO2: 90% (19 Nov 2021 18:45) (90% - 95%)    Plan:  - Hx of Afib, rate controlled w/ metoprolol. Digoxin started today.  - Patient is hemodynamically stable at this time.   - Mg and P in AM  - cardio following  - Will continue to monitor, RN to call if any changes    %---------------------------------------------------- Addendum ------------------------------------------------------%     Called by RN @4710 for Afib with elevated HRs. Pt seen and examined at bedside. pt is asymptomatic. Tele strip examined, HRs nonsustained in 100s to 130s. Pt with episodes of tachycardia earlier in day per flowsheets. Blood pressure in 90's systolic, metoprolol held due to hold parameter. Given pt is nonsustaining will reevaluate blood pressure prior to scheduled Cardizem dose at 12 am. Discussed with RN, continue to monitor hemodynamics and tele monitoring. rn to notify of changes. Called by RN @0648 for an episode of 6 beats of wide QRS vtach. Patient is asymptomatic at this time. Patient is hemodynamically stable at this time.     T(C): 36.6 (19 Nov 2021 16:52), Max: 36.6 (19 Nov 2021 16:52)  T(F): 97.8 (19 Nov 2021 16:52), Max: 97.8 (19 Nov 2021 16:52)  HR: 108 (19 Nov 2021 18:51) (62 - 110)  BP: 110/62 (19 Nov 2021 18:45) (94/59 - 111/71)  BP(mean): --  RR: 16 (19 Nov 2021 18:45) (16 - 18)  SpO2: 90% (19 Nov 2021 18:45) (90% - 95%)    Plan:  - Hx of Afib, rate controlled w/ metoprolol. Digoxin started today.  - Patient is hemodynamically stable at this time.   - Mg and P in AM  - cardio following  - Will continue to monitor, RN to call if any changes    %---------------------------------------------------- Addendum ------------------------------------------------------%     Called by RN @6750 for Afib with elevated HRs. Pt seen and examined at bedside. pt is asymptomatic. Tele strip examined, HRs nonsustained in 100s to 130s. Pt with episodes of tachycardia earlier in day per flowsheets. Blood pressure in 90's systolic, metoprolol held due to hold parameter. Given pt is nonsustaining will reevaluate blood pressure prior to scheduled Cardizem dose at 12 am. Discussed with RN, continue to monitor hemodynamics and tele monitoring. rn to notify of changes.    %---------------------------------------------------- Addendum ------------------------------------------------------%     Patient tachycardic in afib in 130's fluctuating 110-145 bpm. Patient /60. Okay to receive metoprolol and cardizem this AM per hold parameters. Continue routine hemodynamic monitoring. RN to notify of changes. Will sign out to AM team.

## 2021-11-19 NOTE — PROGRESS NOTE ADULT - ATTENDING COMMENTS
af remains rapid, resume dig  severe as on prelim echo, with overall normal ef  discussed the valve disease with her son in law, Dr. KAREN Howard, by phone.  The family wants to pursue whatever evaluation might be appropriate for her locally, noting that she lives in [place] but has been very independent

## 2021-11-19 NOTE — DISCHARGE NOTE PROVIDER - CARE PROVIDERS DIRECT ADDRESSES
,DirectAddress_Unknown,DirectAddress_Unknown ,DirectAddress_Unknown,DirectAddress_Unknown,[email or web address]

## 2021-11-19 NOTE — PROVIDER CONTACT NOTE (OTHER) - ACTION/TREATMENT ORDERED:
Dr Chaidez made aware. Recommended giving digoxin+
Pt's v/s assessed. Resident will order Mag and phos in AM labs

## 2021-11-19 NOTE — PROGRESS NOTE ADULT - PROBLEM SELECTOR PLAN 3
Chronic, stable   - Continue metoprolol 50 mg q 8 hrs with hold parameters  - Continue cardizem 30 mg q6h   - Monitor routine hemodynamics Chronic, stable   - Continue metoprolol 50 mg q 8 hrs with hold parameters  - Continue Cardizem 30 mg q6h   - Monitor routine hemodynamics

## 2021-11-19 NOTE — DISCHARGE NOTE PROVIDER - PROVIDER TOKENS
FREE:[LAST:[Rogers],FIRST:[Bere],PHONE:[(   )    -],FAX:[(   )    -],FOLLOWUP:[1 week]],FREE:[LAST:[Molina],FIRST:[],PHONE:[(   )    -],FAX:[(   )    -],FOLLOWUP:[1 week]] FREE:[LAST:[Rogers],FIRST:[Bere],PHONE:[(   )    -],FAX:[(   )    -],FOLLOWUP:[1 week]],FREE:[LAST:[Molina],FIRST:[],PHONE:[(   )    -],FAX:[(   )    -],FOLLOWUP:[1 week]],PROVIDER:[TOKEN:[[id number]],FOLLOWUP:[1 week]]

## 2021-11-19 NOTE — PROGRESS NOTE ADULT - ATTENDING COMMENTS
Patient is a 89 year old female with PMH afib on Xarelto, CHF, HTN, GERD, osteoarthritis, anxiety, iron deficiency anemia who presents to the ER with complaint of SOB, admitted for CHF exacerbation.  Pt seen, examined, case & care plan d/w pt, resident at detail.  AM Labs   PO diet   ECHO-shows Severe AS  D/W  Dtr at bed side   Palliative Care eval for GOC. Dtr is HCP. pt is DNR/DNI  PT Eval.    Total care time is 45 minutes.

## 2021-11-20 LAB
ANION GAP SERPL CALC-SCNC: 6 MMOL/L — SIGNIFICANT CHANGE UP (ref 5–17)
BUN SERPL-MCNC: 33 MG/DL — HIGH (ref 7–23)
CALCIUM SERPL-MCNC: 8.8 MG/DL — SIGNIFICANT CHANGE UP (ref 8.5–10.1)
CHLORIDE SERPL-SCNC: 109 MMOL/L — HIGH (ref 96–108)
CO2 SERPL-SCNC: 29 MMOL/L — SIGNIFICANT CHANGE UP (ref 22–31)
CREAT SERPL-MCNC: 1.1 MG/DL — SIGNIFICANT CHANGE UP (ref 0.5–1.3)
GLUCOSE SERPL-MCNC: 107 MG/DL — HIGH (ref 70–99)
HCT VFR BLD CALC: 40.7 % — SIGNIFICANT CHANGE UP (ref 34.5–45)
HGB BLD-MCNC: 12.8 G/DL — SIGNIFICANT CHANGE UP (ref 11.5–15.5)
MAGNESIUM SERPL-MCNC: 2.4 MG/DL — SIGNIFICANT CHANGE UP (ref 1.6–2.6)
MCHC RBC-ENTMCNC: 27 PG — SIGNIFICANT CHANGE UP (ref 27–34)
MCHC RBC-ENTMCNC: 31.4 GM/DL — LOW (ref 32–36)
MCV RBC AUTO: 85.9 FL — SIGNIFICANT CHANGE UP (ref 80–100)
NRBC # BLD: 0 /100 WBCS — SIGNIFICANT CHANGE UP (ref 0–0)
PHOSPHATE SERPL-MCNC: 3.7 MG/DL — SIGNIFICANT CHANGE UP (ref 2.5–4.5)
PLATELET # BLD AUTO: 221 K/UL — SIGNIFICANT CHANGE UP (ref 150–400)
POTASSIUM SERPL-MCNC: 3.9 MMOL/L — SIGNIFICANT CHANGE UP (ref 3.5–5.3)
POTASSIUM SERPL-SCNC: 3.9 MMOL/L — SIGNIFICANT CHANGE UP (ref 3.5–5.3)
RBC # BLD: 4.74 M/UL — SIGNIFICANT CHANGE UP (ref 3.8–5.2)
RBC # FLD: 16.6 % — HIGH (ref 10.3–14.5)
SODIUM SERPL-SCNC: 144 MMOL/L — SIGNIFICANT CHANGE UP (ref 135–145)
T3FREE SERPL-MCNC: 2.43 PG/ML — SIGNIFICANT CHANGE UP (ref 1.8–4.6)
T4 FREE SERPL-MCNC: 1.6 NG/DL — SIGNIFICANT CHANGE UP (ref 0.9–1.8)
TSH SERPL-MCNC: 1.78 UIU/ML — SIGNIFICANT CHANGE UP (ref 0.36–3.74)
WBC # BLD: 5.71 K/UL — SIGNIFICANT CHANGE UP (ref 3.8–10.5)
WBC # FLD AUTO: 5.71 K/UL — SIGNIFICANT CHANGE UP (ref 3.8–10.5)

## 2021-11-20 PROCEDURE — 99233 SBSQ HOSP IP/OBS HIGH 50: CPT

## 2021-11-20 RX ORDER — FUROSEMIDE 40 MG
40 TABLET ORAL DAILY
Refills: 0 | Status: DISCONTINUED | OUTPATIENT
Start: 2021-11-20 | End: 2021-11-22

## 2021-11-20 RX ORDER — FUROSEMIDE 40 MG
40 TABLET ORAL DAILY
Refills: 0 | Status: DISCONTINUED | OUTPATIENT
Start: 2021-11-20 | End: 2021-11-20

## 2021-11-20 RX ADMIN — MEMANTINE HYDROCHLORIDE 10 MILLIGRAM(S): 10 TABLET ORAL at 18:12

## 2021-11-20 RX ADMIN — MEMANTINE HYDROCHLORIDE 10 MILLIGRAM(S): 10 TABLET ORAL at 05:20

## 2021-11-20 RX ADMIN — RIVAROXABAN 15 MILLIGRAM(S): KIT at 11:15

## 2021-11-20 RX ADMIN — ZOLPIDEM TARTRATE 10 MILLIGRAM(S): 10 TABLET ORAL at 20:12

## 2021-11-20 RX ADMIN — SENNA PLUS 2 TABLET(S): 8.6 TABLET ORAL at 22:24

## 2021-11-20 RX ADMIN — Medication 30 MILLIGRAM(S): at 18:12

## 2021-11-20 RX ADMIN — Medication 30 MILLIGRAM(S): at 11:14

## 2021-11-20 RX ADMIN — Medication 30 MILLIGRAM(S): at 05:20

## 2021-11-20 RX ADMIN — Medication 10 MILLIGRAM(S): at 18:08

## 2021-11-20 RX ADMIN — PANTOPRAZOLE SODIUM 20 MILLIGRAM(S): 20 TABLET, DELAYED RELEASE ORAL at 11:15

## 2021-11-20 RX ADMIN — Medication 50 MILLIGRAM(S): at 13:49

## 2021-11-20 RX ADMIN — Medication 325 MILLIGRAM(S): at 11:14

## 2021-11-20 RX ADMIN — Medication 50 MILLIGRAM(S): at 22:24

## 2021-11-20 RX ADMIN — Medication 50 MILLIGRAM(S): at 05:20

## 2021-11-20 NOTE — PROGRESS NOTE ADULT - ATTENDING COMMENTS
volume status improved  rates remain labile with missed doses of meds 2/2 low bp  dig resumed, and other  meds given this am  anticipate dc tomorrow with outpt repetition of echo and tavr evaluation if appropriate

## 2021-11-20 NOTE — PROGRESS NOTE ADULT - ASSESSMENT
89 year old female with PMH afib on Xarelto, CHF, HTN, GERD, osteoarthritis, anxiety, iron deficiency anemia presents with decompensated heart failure      Severe AS,/ CHF/ HTN   on iv lasix BID, change to daily reassess in am   tele with afib episodes of tachy overnight in setting of missed cardizem and bb , now given with rates controlled 80bpm   digoxin resumed to start today   strict intake and output   echo with moderate to severe AS, follow up outpatient with Dr Lopez for possible TAVR eval as outpatient   supplemental oxygen attempt to wean off         Monitor and replete electrolytes. Keep K>4.0 and Mg>2.0.  Liana Oneill FNP-C  Cardiology NP  [phone]

## 2021-11-20 NOTE — PROGRESS NOTE ADULT - PROBLEM SELECTOR PLAN 3
Chronic, stable   - Continue metoprolol 50 mg q 8 hrs with hold parameters  - Continue Cardizem 30 mg q6h with hold parameters  - Monitor routine hemodynamics

## 2021-11-20 NOTE — PROGRESS NOTE ADULT - ASSESSMENT
pt with AF - HTN - OP - OA - Hard of Hearing - HF - valvular heart disease - on AC - now with LE edema and more sob - mayer -     overnight events noted - Digoxin - Cardizem - Lasix - Discussion About TAVR - Cardio f/u -     on LASIX - Cardizem - HF and AF optimization management in progress  remains on o2 support  keep sat > 88 pct  pt is DNR  TTE report severe AS    cardio eval in progress - Judicious Diuresis - I and O - cvs rx regimen and BP control - rate control for AF - TTE -     CXR reviewed - eff - atelectasis -     monitor VS and HD and Sat    GOC discussion - DNR    dietary discretion    proBNP consistent with vol overload - HF -     spoke with Daughter and Son in Law

## 2021-11-20 NOTE — PROGRESS NOTE ADULT - SUBJECTIVE AND OBJECTIVE BOX
Date/Time Patient Seen:  		  Referring MD:   Data Reviewed	       Patient is a 89y old  Female who presents with a chief complaint of CHF exacerbation (19 Nov 2021 16:14)      Subjective/HPI     PAST MEDICAL & SURGICAL HISTORY:  Chronic atrial fibrillation    CHF, chronic    Hypertension    Osteoarthritis    Dementia    GERD (gastroesophageal reflux disease)    Iron deficiency anemia    History of cholecystectomy          Medication list         MEDICATIONS  (STANDING):  digoxin     Tablet 125 MICROGram(s) Oral every other day  diltiazem    Tablet 30 milliGRAM(s) Oral every 6 hours  ergocalciferol 38423 Unit(s) Oral <User Schedule>  ferrous    sulfate 325 milliGRAM(s) Oral daily  furosemide   Injectable 40 milliGRAM(s) IV Push every 12 hours  influenza  Vaccine (HIGH DOSE) 0.7 milliLiter(s) IntraMuscular once  memantine 10 milliGRAM(s) Oral two times a day  metoprolol tartrate 50 milliGRAM(s) Oral every 8 hours  pantoprazole    Tablet 20 milliGRAM(s) Oral daily  rivaroxaban 15 milliGRAM(s) Oral daily  senna 2 Tablet(s) Oral at bedtime  zolpidem 10 milliGRAM(s) Oral at bedtime    MEDICATIONS  (PRN):  acetaminophen     Tablet .. 650 milliGRAM(s) Oral every 6 hours PRN Temp greater or equal to 38C (100.4F), Mild Pain (1 - 3)         Vitals log        ICU Vital Signs Last 24 Hrs  T(C): 36.9 (20 Nov 2021 04:18), Max: 37.1 (19 Nov 2021 22:57)  T(F): 98.4 (20 Nov 2021 04:18), Max: 98.7 (19 Nov 2021 22:57)  HR: 80 (20 Nov 2021 04:18) (62 - 117)  BP: 104/60 (20 Nov 2021 04:43) (90/61 - 110/62)  BP(mean): --  ABP: --  ABP(mean): --  RR: 18 (20 Nov 2021 04:18) (16 - 18)  SpO2: 94% (20 Nov 2021 04:18) (90% - 95%)           Input and Output:  I&O's Detail    18 Nov 2021 07:01  -  19 Nov 2021 07:00  --------------------------------------------------------  IN:    Oral Fluid: 300 mL  Total IN: 300 mL    OUT:    Voided (mL): 600 mL  Total OUT: 600 mL    Total NET: -300 mL      19 Nov 2021 07:01  -  20 Nov 2021 06:18  --------------------------------------------------------  IN:  Total IN: 0 mL    OUT:    Voided (mL): 700 mL  Total OUT: 700 mL    Total NET: -700 mL          Lab Data                        13.5   7.50  )-----------( 230      ( 19 Nov 2021 08:02 )             41.4     11-19    144  |  109<H>  |  26<H>  ----------------------------<  115<H>  4.5   |  25  |  1.30    Ca    9.1      19 Nov 2021 08:02  Phos  3.4     11-19  Mg     2.4     11-19    TPro  6.4  /  Alb  3.1<L>  /  TBili  0.6  /  DBili  x   /  AST  22  /  ALT  50  /  AlkPhos  141<H>  11-18            Review of Systems	      Objective     Physical Examination    heart s1s2  lung dec BS  abd soft  head nc      Pertinent Lab findings & Imaging      Leah:  NO   Adequate UO     I&O's Detail    18 Nov 2021 07:01  -  19 Nov 2021 07:00  --------------------------------------------------------  IN:    Oral Fluid: 300 mL  Total IN: 300 mL    OUT:    Voided (mL): 600 mL  Total OUT: 600 mL    Total NET: -300 mL      19 Nov 2021 07:01  -  20 Nov 2021 06:18  --------------------------------------------------------  IN:  Total IN: 0 mL    OUT:    Voided (mL): 700 mL  Total OUT: 700 mL    Total NET: -700 mL               Discussed with:     Cultures:	        Radiology

## 2021-11-20 NOTE — PROGRESS NOTE ADULT - ATTENDING COMMENTS
Patient is a 89 year old female with PMH afib on Xarelto, CHF, HTN, GERD, osteoarthritis, anxiety, iron deficiency anemia who presents to the ER with complaint of SOB, admitted for CHF exacerbation.  Pt seen, examined, case & care plan d/w pt, resident at detail.  AM Labs   PO diet   ECHO-shows Severe AS  D/W  Dtr at bed side , D/C plan in AM   Palliative Care eval for GOC. Dtr is HCP. pt is DNR/DNI  PT Eval. ---> HCPT D/W Case management.  Total care time is 45 minutes.

## 2021-11-20 NOTE — PROGRESS NOTE ADULT - SUBJECTIVE AND OBJECTIVE BOX
[facility] -- Sage Monzon, Isaac, Jessica, Lonny Alegre Savella  Office # [phone]      Follow Up:    afib aortic stenosis   Subjective/Observations:   No events overnight resting comfortably in bed.  No complaints of chest pain, dyspnea, or palpitations reported. No signs of orthopnea or PND.  remains on nasal cannula 2 liters     REVIEW OF SYSTEMS: All other review of systems is negative unless indicated above    PAST MEDICAL & SURGICAL HISTORY:  Chronic atrial fibrillation    CHF, chronic    Hypertension    Osteoarthritis    Dementia    GERD (gastroesophageal reflux disease)    Iron deficiency anemia    History of cholecystectomy        MEDICATIONS  (STANDING):  digoxin     Tablet 125 MICROGram(s) Oral every other day  diltiazem    Tablet 30 milliGRAM(s) Oral every 6 hours  ergocalciferol 46656 Unit(s) Oral <User Schedule>  ferrous    sulfate 325 milliGRAM(s) Oral daily  influenza  Vaccine (HIGH DOSE) 0.7 milliLiter(s) IntraMuscular once  memantine 10 milliGRAM(s) Oral two times a day  metoprolol tartrate 50 milliGRAM(s) Oral every 8 hours  pantoprazole    Tablet 20 milliGRAM(s) Oral daily  rivaroxaban 15 milliGRAM(s) Oral daily  senna 2 Tablet(s) Oral at bedtime  zolpidem 10 milliGRAM(s) Oral at bedtime    MEDICATIONS  (PRN):  acetaminophen     Tablet .. 650 milliGRAM(s) Oral every 6 hours PRN Temp greater or equal to 38C (100.4F), Mild Pain (1 - 3)      Allergies    No Known Allergies    Intolerances        Vital Signs Last 24 Hrs  T(C): 36.9 (20 Nov 2021 04:18), Max: 37.1 (19 Nov 2021 22:57)  T(F): 98.4 (20 Nov 2021 04:18), Max: 98.7 (19 Nov 2021 22:57)  HR: 80 (20 Nov 2021 04:18) (62 - 117)  BP: 104/60 (20 Nov 2021 04:43) (90/61 - 110/62)  BP(mean): --  RR: 18 (20 Nov 2021 04:18) (16 - 18)  SpO2: 94% (20 Nov 2021 04:18) (90% - 95%)    I&O's Summary    19 Nov 2021 07:01  -  20 Nov 2021 07:00  --------------------------------------------------------  IN: 0 mL / OUT: 700 mL / NET: -700 mL    20 Nov 2021 07:01  -  20 Nov 2021 10:01  --------------------------------------------------------  IN: 120 mL / OUT: 0 mL / NET: 120 mL          PHYSICAL EXAM:  TELE: Afib tachy at times 120bpm, 6 beats nsvt overnight   Constitutional: NAD, awake and alert, well-developed  HEENT: Moist Mucous Membranes, Anicteric  Pulmonary: Non-labored, breath sounds are dim bases   Cardiovascular: Regular, S1 and S2 nl, murmur   Gastrointestinal: Bowel Sounds present, soft, nontender.   Lymph: No lymphadenopathy. No peripheral edema.  Skin: No visible rashes or ulcers.  Psych:  Mood & affect appropriate    LABS: All Labs Reviewed:                        12.8   5.71  )-----------( 221      ( 20 Nov 2021 08:10 )             40.7                         13.5   7.50  )-----------( 230      ( 19 Nov 2021 08:02 )             41.4                         12.1   5.48  )-----------( 211      ( 18 Nov 2021 06:53 )             38.1     20 Nov 2021 08:10    144    |  109    |  33     ----------------------------<  107    3.9     |  29     |  1.10   19 Nov 2021 08:02    144    |  109    |  26     ----------------------------<  115    4.5     |  25     |  1.30   18 Nov 2021 06:53    145    |  106    |  20     ----------------------------<  98     3.1     |  29     |  0.91     Ca    8.8        20 Nov 2021 08:10  Ca    9.1        19 Nov 2021 08:02  Ca    8.6        18 Nov 2021 06:53  Phos  3.7       20 Nov 2021 08:10  Phos  3.4       19 Nov 2021 08:02  Mg     2.4       20 Nov 2021 08:10  Mg     2.4       19 Nov 2021 08:02    TPro  6.4    /  Alb  3.1    /  TBili  0.6    /  DBili  x      /  AST  22     /  ALT  50     /  AlkPhos  141    18 Nov 2021 06:53  TPro  6.3    /  Alb  3.0    /  TBili  0.5    /  DBili  x      /  AST  24     /  ALT  58     /  AlkPhos  147    17 Nov 2021 11:31            < from: TTE Echo Complete w/o Contrast w/ Doppler (11.18.21 @ 10:09) >  XAM:  ECHO TTE WO CON COMP W DOPP         PROCEDURE DATE:  11/18/2021        INTERPRETATION:  INDICATION: Heart failure  Sonographer PH    Blood Pressure 101/66    Height 147.3 cm     Weight 50 kg       BSA 1.4 sq m    Dimensions:  LA 5.2       Normal Values: 2.0 - 4.0 cm  Ao 2.5        Normal Values: 2.0 - 3.8 cm  SEPTUM 1.2       Normal Values: 0.6 - 1.2 cm  PWT 1.2       Normal Values: 0.6 - 1.1 cm  LVIDd 4.0         Normal Values: 3.0 - 5.6 cm  LVIDs 2.8         Normal Values: 1.8 - 4.0 cm      OBSERVATIONS:  Mitral Valve: Mitral annular calcification with thickened leaflets. Mitral valve area equals 1.59 sq cm which is consistent with mild mitral stenosis. Mild MR.  Aortic Valve/Aorta: Calcified trileaflet aortic valve with decreasedopening. Peak transaortic valve gradient is 53 mmHg with a mean transaortic valve gradient of 32 mmHg. The aortic valve area is calculated to be 0.4 sq cm by continuity equation. This is consistent with moderate to severe aortic stenosis.  Tricuspid Valve: Mild TR.  Pulmonic Valve: Not well-visualized  Left Atrium: normal  Right Atrium: Not well-visualized  Left Ventricle: normal LV size and systolic function, estimated LVEF of 50-55%.  Right Ventricle: Grossly normal size and systolic function.  Pericardium: no significant pericardial effusion.  Pulmonary/RV Pressure: estimated PA systolic pressure of 32 mmHg        IMPRESSION:  Normal left ventricular internal dimensions and systolic function, estimated LVEF of 50-55%.  Grossly normal RV size and systolic function.  Calcified trileaflet aortic valve with moderate to severe aortic stenosis, without AI.  Mild mitral stenosis with mild regurgitation  Mild tricuspid regurgitation  No significant pericardial effusion.    < end of copied text >

## 2021-11-20 NOTE — PROGRESS NOTE ADULT - SUBJECTIVE AND OBJECTIVE BOX
Patient is a 89y old  Female who presents with a chief complaint of CHF exacerbation (20 Nov 2021 10:01)    HPI: Patient is a 89 year old female with PMH afib on Xarelto, CHF, HTN, GERD, osteoarthritis, anxiety, iron deficiency anemia who presents to the ER with complaint of SOB. She has been feeling short of breath for a few months now, but her symptoms have worsened over the past week. She has been having shortness of breath with minimal exertion, and some orthopnea, but is able to tolerate lying flat. She denies any cough, congestion, fever, chills, chest pain, abd pain, diarrhea, nausea, or LE pain. She reports being chronically constipated, and is on multiple medications to help her have BMs. Since her symptoms were exacerbated, she has been noticing palpitations. She has been hospitalized for atrial fibrillation before in 2019 in [place]. She has no further complaints or concerns at this time.     In the ED:   VS: T:99, HR: 127, BP: 128/78, RR;18, SpO2: 96% on room air   Labs were significant for Cl: 112, BUN: 25, alk phos: 147, proBNP: 4782  CXR: on personal read, pulmonary vascular congestion noted   EKG: , AFib with RVR, LAD, nonspecific ST and T wave abnormality  Patient received Lasix 40 mg IVP x 1    INTERVAL HPI:  (11/18/21)- Pt seen and examined at bedside this AM. She reports improvement in her difficulty breathing and her palpitations. She appears more comfortable today as well. She has no pain, and otherwise has no complaints or concerns. On IV Lasix , Pt has Home O2 uses PRN,  (11/19/21)- Pt seen and examined at bedside this morning. States she feels well, denies any SOB or difficulty breathing, appears comfortable in bed. Palliative evaluated patient yesterday and she is DNR/DNI. Remains on IV Lasix, mild increase in Cr noted. Will hold PM dose today. Remains in afib with rates in the 100s. Started on digoxin qod as per cardio recommendations.   (11/20/21) Pt seen and examined at the bedside this AM. She denies any shortness of breath, cough, chest pain or palpitations. She has no questions or concerns at this time, and appears comfortable lying in bed.     Overnight events: Overnight, pt had 6 beats of VTach. RN also called for tachycardia, but metoprolol was held due to parameters. HR was still elevated later in the night, but pt was able to receive metoprolol and Cardizem.     Home Medications:  Ferrex 150 Plus oral capsule: 1 cap(s) orally once a day (17 Nov 2021 13:16)  furosemide 20 mg oral tablet: 1 tab(s) orally once a day (17 Nov 2021 13:16)  memantine 28 mg oral capsule, extended release: 1 cap(s) orally once a day (17 Nov 2021 13:16)  Metoprolol Tartrate 25 mg oral tablet: 2 tab(s) orally 2 times a day (17 Nov 2021 13:16)  pantoprazole 20 mg oral delayed release tablet: 1 tab(s) orally once a day (17 Nov 2021 13:16)  Vitamin D3 1250 mcg (50,000 intl units) oral capsule: 1 cap(s) orally once a week on Monday  (17 Nov 2021 13:16)  Xarelto 15 mg oral tablet: 1 tab(s) orally once a day (in the evening) (17 Nov 2021 13:16)  zolpidem 10 mg oral tablet: 1 tab(s) orally once a day (at bedtime), As Needed (17 Nov 2021 13:16)    MEDICATIONS  (STANDING):  digoxin   Tablet 125 MICROGram(s) Oral every other day  diltiazem  Tablet 30 milliGRAM(s) Oral every 6 hours  ergocalciferol 65685 Unit(s) Oral <User Schedule>  ferrous    sulfate 325 milliGRAM(s) Oral daily  furosemide    Tablet 40 milliGRAM(s) Oral daily  influenza  Vaccine (HIGH DOSE) 0.7 milliLiter(s) IntraMuscular once  memantine 10 milliGRAM(s) Oral two times a day  metoprolol tartrate 50 milliGRAM(s) Oral every 8 hours  pantoprazole    Tablet 20 milliGRAM(s) Oral daily  rivaroxaban 15 milliGRAM(s) Oral daily  senna 2 Tablet(s) Oral at bedtime  zolpidem 10 milliGRAM(s) Oral at bedtime    MEDICATIONS  (PRN):  acetaminophen     Tablet .. 650 milliGRAM(s) Oral every 6 hours PRN Temp greater or equal to 38C (100.4F), Mild Pain (1 - 3)    No Known Allergies    Social History:  Pt lives alone at home, receives help from an aide a few times a week for a few hours at a time to help with IADLs.  Denies EtOH, tobacco, and recreational drug use.   Uses walker to ambulate at home. (17 Nov 2021 13:14)    REVIEW OF SYSTEMS:  CONSTITUTIONAL: No fever, No chills  EYES: No eye pain, No visual disturbances, No discharge, No Redness  ENMT: No vertigo; No sinus pain, No throat pain, No Congestion  NECK: No pain, No stiffness  RESPIRATORY: No cough, No wheezing, No shortness of breath  CARDIOVASCULAR: No chest pain, No palpitations  GASTROINTESTINAL: No abdominal pain, No epigastric pain. No nausea, No vomiting, No diarrhea, No constipation; [  ] BM  GENITOURINARY: No dysuria, No frequency, No urgency, No hematuria, No incontinence  NEUROLOGICAL: No headaches, No dizziness, No weakness  EXTREMITIES: No Swelling, No Pain, No Edema  SKIN: [ x ] No itching, burning, rashes, or lesions   MUSCULOSKELETAL: No joint pain, No joint swelling; No muscle pain, No back pain, No extremity pain  PSYCHIATRIC: No depression, No anxiety, No mood swings, No difficulty sleeping at night  PAIN SCALE: [ x ] None  [  ] Other-  ROS Unable to obtain due to: [  ] Dementia  [  ] Lethargy  [  ] Sedated  [  ] Non verbal  REST OF REVIEW OF SYSTEMS: [ x ] Normal     Vital Signs Last 24 Hrs  T(C): 36.9 (20 Nov 2021 04:18), Max: 37.1 (19 Nov 2021 22:57)  T(F): 98.4 (20 Nov 2021 04:18), Max: 98.7 (19 Nov 2021 22:57)  HR: 80 (20 Nov 2021 04:18) (62 - 117)  BP: 104/60 (20 Nov 2021 04:43) (90/61 - 110/62)  RR: 18 (20 Nov 2021 04:18) (16 - 18)  SpO2: 94% (20 Nov 2021 04:18) (90% - 95%)    CAPILLARY BLOOD GLUCOSE    I&O's Summary    19 Nov 2021 07:01  -  20 Nov 2021 07:00  --------------------------------------------------------  IN: 0 mL / OUT: 700 mL / NET: -700 mL    20 Nov 2021 07:01  -  20 Nov 2021 11:13  --------------------------------------------------------  IN: 120 mL / OUT: 0 mL / NET: 120 mL    PHYSICAL EXAM:  GENERAL:  [ x ] NAD, [ x ] Well appearing, [  ] Agitated, [  ] Drowsy, [  ] Lethargy, [  ] Confused   HEAD:  [ x ] Normal, [  ] Other  EYES:  [ x ] EOMI, [ x ] PERRLA, [ x ] Conjunctiva and sclera clear normal, [  ] Other, [  ] Pallor, [  ] Discharge  ENMT:  [ x ] Normal, [ x ] Moist mucous membranes, [  ] Good dentition, [ x ] No thrush  NECK:  [ x ] Supple, [ x ] No JVD, [  ] Normal thyroid, [  ] Lymphadenopathy, [  ] Other  CHEST/LUNG:  [  ] Clear to auscultation bilaterally, [ x ] Breath Sounds equal B/L / decreased, [  ] Poor effort, [ x ] Bibasilar rales, [  ] No rhonchi, [  ] No wheezing  HEART:  [  ] Regular rate and rhythm, [  ] Tachycardia, [  ] Bradycardia, [ x ] Irregular, [ x ] Parasternal systolic murmur, No rubs, No gallops, [  ] PPM in place (Mfr:  )  ABDOMEN:  [ x ] Soft, [ x ] Nontender, [ x ] Nondistended, [  ] No mass, [ x ] Bowel sounds present, [  ] Obese  NERVOUS SYSTEM:  [ x ] Alert & Oriented x3, [ x ] Nonfocal, [  ] Confusion, [  ] Encephalopathic, [  ] Sedated, [  ] Unable to assess, [  ] Dementia, [  ] Other-  EXTREMITIES:  [ x ] 2+ Peripheral Pulses, No clubbing, No cyanosis,  [  ] Edema B/L lower EXT, [  ] PVD stasis skin changes B/L lower EXT, [  ] Wound  LYMPH:  No lymphadenopathy noted  SKIN:  [ x ] No rashes or lesions, [  ] Pressure ulcers, [  ] Ecchymosis, [  ] Skin tears, [  ] Other    DIET: Diet, DASH/TLC:   Sodium & Cholesterol Restricted  1000mL Fluid Restriction ([id number]) (11-17-21 @ 16:02)    LABS:                        12.8   5.71  )-----------( 221      ( 20 Nov 2021 08:10 )             40.7     20 Nov 2021 08:10    144    |  109    |  33     ----------------------------<  107    3.9     |  29     |  1.10     Ca    8.8        20 Nov 2021 08:10  Phos  3.7       20 Nov 2021 08:10  Mg     2.4       20 Nov 2021 08:10    CARDIAC MARKERS ( 17 Nov 2021 11:31 )  x     / x     / x     / x     / 1.6 ng/mL    Anemia Panel:    Thyroid Panel:  Thyroid Stimulating Hormone, Serum: 1.78 uIU/mL (11-20-21 @ 08:10)    Serum Pro-Brain Natriuretic Peptide: 4782 pg/mL (11-17-21 @ 11:31)    RADIOLOGY & ADDITIONAL TESTS:    HEALTH ISSUES - PROBLEM Dx:  Acute exacerbation of CHF (congestive heart failure)  Atrial fibrillation  Hypertension  GERD (gastroesophageal reflux disease)  Dementia  Iron deficiency anemia  Osteoarthritis  Need for prophylactic measure    Consultant(s) Notes Reviewed:  [ x ] YES     Care Discussed with [ x ] Consultants, [ x ] Patient, [  ] Family, [  ] HCP, [  ] , [  ] Social Service, [  ] RN, [  ] Physical Therapy, [  ] Palliative Care Team  DVT PPX: [  ] Lovenox, [  ] SC Heparin, [  ] Coumadin, [ x ] Xarelto, [  ] Eliquis, [  ] Pradaxa, [  ] IV Heparin drip, [  ] SCD, [  ] Ambulation, [  ] Contraindicated 2/2 GI Bleed, [  ] Contraindicated 2/2  Bleed, [  ] Contraindicated 2/2 Brain Bleed  Advanced Directive: [  ] None, [ x ] DNR/DNI Patient is a 89y old  Female who presents with a chief complaint of CHF exacerbation (20 Nov 2021 10:01)    HPI: Patient is a 89 year old female with PMH afib on Xarelto, CHF, HTN, GERD, osteoarthritis, anxiety, iron deficiency anemia who presents to the ER with complaint of SOB. She has been feeling short of breath for a few months now, but her symptoms have worsened over the past week. She has been having shortness of breath with minimal exertion, and some orthopnea, but is able to tolerate lying flat. She denies any cough, congestion, fever, chills, chest pain, abd pain, diarrhea, nausea, or LE pain. She reports being chronically constipated, and is on multiple medications to help her have BMs. Since her symptoms were exacerbated, she has been noticing palpitations. She has been hospitalized for atrial fibrillation before in 2019 in [place]. She has no further complaints or concerns at this time.     In the ED:   VS: T:99, HR: 127, BP: 128/78, RR;18, SpO2: 96% on room air   Labs were significant for Cl: 112, BUN: 25, alk phos: 147, proBNP: 4782  CXR: on personal read, pulmonary vascular congestion noted   EKG: , AFib with RVR, LAD, nonspecific ST and T wave abnormality  Patient received Lasix 40 mg IVP x 1    INTERVAL HPI:  (11/18/21)- Pt seen and examined at bedside this AM. She reports improvement in her difficulty breathing and her palpitations. She appears more comfortable today as well. She has no pain, and otherwise has no complaints or concerns. On IV Lasix , Pt has Home O2 uses PRN,  (11/19/21)- Pt seen and examined at bedside this morning. States she feels well, denies any SOB or difficulty breathing, appears comfortable in bed. Palliative evaluated patient yesterday and she is DNR/DNI. Remains on IV Lasix, mild increase in Cr noted. Will hold PM dose today. Remains in afib with rates in the 100s. Started on digoxin qod as per cardio recommendations.   (11/20/21) Pt seen and examined at the bedside this AM. She denies any shortness of breath, cough, chest pain or palpitations. She has no questions or concerns at this time, and appears comfortable lying in bed. On PO Lasix     Overnight events: Overnight, pt had 6 beats of VTach. RN also called for tachycardia, but metoprolol was held due to parameters. HR was still elevated later in the night, but pt was able to receive metoprolol and Cardizem.     Home Medications:  Ferrex 150 Plus oral capsule: 1 cap(s) orally once a day (17 Nov 2021 13:16)  furosemide 20 mg oral tablet: 1 tab(s) orally once a day (17 Nov 2021 13:16)  memantine 28 mg oral capsule, extended release: 1 cap(s) orally once a day (17 Nov 2021 13:16)  Metoprolol Tartrate 25 mg oral tablet: 2 tab(s) orally 2 times a day (17 Nov 2021 13:16)  pantoprazole 20 mg oral delayed release tablet: 1 tab(s) orally once a day (17 Nov 2021 13:16)  Vitamin D3 1250 mcg (50,000 intl units) oral capsule: 1 cap(s) orally once a week on Monday  (17 Nov 2021 13:16)  Xarelto 15 mg oral tablet: 1 tab(s) orally once a day (in the evening) (17 Nov 2021 13:16)  zolpidem 10 mg oral tablet: 1 tab(s) orally once a day (at bedtime), As Needed (17 Nov 2021 13:16)    MEDICATIONS  (STANDING):  digoxin   Tablet 125 MICROGram(s) Oral every other day  diltiazem  Tablet 30 milliGRAM(s) Oral every 6 hours  ergocalciferol 33715 Unit(s) Oral <User Schedule>  ferrous    sulfate 325 milliGRAM(s) Oral daily  furosemide    Tablet 40 milliGRAM(s) Oral daily  influenza  Vaccine (HIGH DOSE) 0.7 milliLiter(s) IntraMuscular once  memantine 10 milliGRAM(s) Oral two times a day  metoprolol tartrate 50 milliGRAM(s) Oral every 8 hours  pantoprazole    Tablet 20 milliGRAM(s) Oral daily  rivaroxaban 15 milliGRAM(s) Oral daily  senna 2 Tablet(s) Oral at bedtime  zolpidem 10 milliGRAM(s) Oral at bedtime    MEDICATIONS  (PRN):  acetaminophen     Tablet .. 650 milliGRAM(s) Oral every 6 hours PRN Temp greater or equal to 38C (100.4F), Mild Pain (1 - 3)    No Known Allergies    Social History:  Pt lives alone at home, receives help from an aide a few times a week for a few hours at a time to help with IADLs.  Denies EtOH, tobacco, and recreational drug use.   Uses walker to ambulate at home. (17 Nov 2021 13:14)    REVIEW OF SYSTEMS: i am OK  CONSTITUTIONAL: No fever, No chills  EYES: No eye pain, No visual disturbances, No discharge, No Redness  ENMT: No vertigo; No sinus pain, No throat pain, No Congestion  NECK: No pain, No stiffness  RESPIRATORY: No cough, No wheezing, No shortness of breath  CARDIOVASCULAR: No chest pain, No palpitations  GASTROINTESTINAL: No abdominal pain, No epigastric pain. No nausea, No vomiting, No diarrhea, No constipation; [  ] BM  GENITOURINARY: No dysuria, No frequency, No urgency, No hematuria, No incontinence  NEUROLOGICAL: No headaches, No dizziness, No weakness  EXTREMITIES: No Swelling, No Pain, No Edema  SKIN: [ x ] No itching, burning, rashes, or lesions   MUSCULOSKELETAL: No joint pain, No joint swelling; No muscle pain, No back pain, No extremity pain  PSYCHIATRIC: No depression, No anxiety, No mood swings, No difficulty sleeping at night  PAIN SCALE: [ x ] None  [  ] Other-  ROS Unable to obtain due to: [  ] Dementia  [  ] Lethargy  [  ] Sedated  [  ] Non verbal  REST OF REVIEW OF SYSTEMS: [ x ] Normal     Vital Signs Last 24 Hrs  T(C): 36.9 (20 Nov 2021 04:18), Max: 37.1 (19 Nov 2021 22:57)  T(F): 98.4 (20 Nov 2021 04:18), Max: 98.7 (19 Nov 2021 22:57)  HR: 80 (20 Nov 2021 04:18) (62 - 117)  BP: 104/60 (20 Nov 2021 04:43) (90/61 - 110/62)  RR: 18 (20 Nov 2021 04:18) (16 - 18)  SpO2: 94% (20 Nov 2021 04:18) (90% - 95%)    CAPILLARY BLOOD GLUCOSE    I&O's Summary    19 Nov 2021 07:01  -  20 Nov 2021 07:00  --------------------------------------------------------  IN: 0 mL / OUT: 700 mL / NET: -700 mL    20 Nov 2021 07:01  -  20 Nov 2021 11:13  --------------------------------------------------------  IN: 120 mL / OUT: 0 mL / NET: 120 mL    PHYSICAL EXAM:  GENERAL:  [ x ] NAD, [ x ] Well appearing, [  ] Agitated, [  ] Drowsy, [  ] Lethargy, [  ] Confused   HEAD:  [ x ] Normal, [  ] Other  EYES:  [ x ] EOMI, [ x ] PERRLA, [ x ] Conjunctiva and sclera clear normal, [  ] Other, [  ] Pallor, [  ] Discharge  ENMT:  [ x ] Normal, [ x ] Moist mucous membranes, [  ] Good dentition, [ x ] No thrush  NECK:  [ x ] Supple, [ x ] No JVD, [ x ] Normal thyroid, [  ] Lymphadenopathy, [  ] Other  CHEST/LUNG:  [  ] Clear to auscultation bilaterally, [ x ] Breath Sounds equal B/L / decreased, [  ] Poor effort, [ x ] Bibasilar rales, [x  ] No rhonchi, [ x ] No wheezing  HEART:  [  ] Regular rate and rhythm, [  ] Tachycardia, [  ] Bradycardia, [ x ] Irregular, [ x ] Parasternal systolic murmur, No rubs, No gallops, [  ] PPM in place (Mfr:  )  ABDOMEN:  [ x ] Soft, [ x ] Nontender, [ x ] Nondistended, [ x ] No mass, [ x ] Bowel sounds present, [  ] Obese  NERVOUS SYSTEM:  [ x ] Alert & Oriented x3, [ x ] Nonfocal, [  ] Confusion, [  ] Encephalopathic, [  ] Sedated, [  ] Unable to assess, [  ] Dementia, [  ] Other-  EXTREMITIES:  [ x ] 2+ Peripheral Pulses, No clubbing, No cyanosis,  [  ] Edema B/L lower EXT, [  ] PVD stasis skin changes B/L lower EXT, [  ] Wound  LYMPH:  No lymphadenopathy noted  SKIN:  [ x ] No rashes or lesions, [  ] Pressure ulcers, [  ] Ecchymosis, [  ] Skin tears, [  ] Other    DIET: Diet, DASH/TLC:   Sodium & Cholesterol Restricted  1000mL Fluid Restriction ([id number]) (11-17-21 @ 16:02)    LABS:                        12.8   5.71  )-----------( 221      ( 20 Nov 2021 08:10 )             40.7     20 Nov 2021 08:10    144    |  109    |  33     ----------------------------<  107    3.9     |  29     |  1.10     Ca    8.8        20 Nov 2021 08:10  Phos  3.7       20 Nov 2021 08:10  Mg     2.4       20 Nov 2021 08:10    CARDIAC MARKERS ( 17 Nov 2021 11:31 )  x     / x     / x     / x     / 1.6 ng/mL    Anemia Panel:    Thyroid Panel:  Thyroid Stimulating Hormone, Serum: 1.78 uIU/mL (11-20-21 @ 08:10)    Serum Pro-Brain Natriuretic Peptide: 4782 pg/mL (11-17-21 @ 11:31)    RADIOLOGY & ADDITIONAL TESTS:  < from: TTE Echo Complete w/o Contrast w/ Doppler (11.18.21 @ 10:09) >     EXAM:  ECHO TTE WO CON COMP W DOPP         PROCEDURE DATE:  11/18/2021        INTERPRETATION:  INDICATION: Heart failure  Sonographer PH    Blood Pressure 101/66    Height 147.3 cm     Weight 50 kg       BSA 1.4 sq m    Dimensions:  LA 5.2       Normal Values: 2.0 - 4.0 cm  Ao 2.5        Normal Values: 2.0 - 3.8 cm  SEPTUM 1.2       Normal Values: 0.6 - 1.2 cm  PWT 1.2       Normal Values: 0.6 - 1.1 cm  LVIDd 4.0         Normal Values: 3.0 - 5.6 cm  LVIDs 2.8         Normal Values: 1.8 - 4.0 cm      OBSERVATIONS:  Mitral Valve: Mitral annular calcification with thickened leaflets. Mitral valve area equals 1.59 sq cm which is consistent with mild mitral stenosis. Mild MR.  Aortic Valve/Aorta: Calcified trileaflet aortic valve with decreasedopening. Peak transaortic valve gradient is 53 mmHg with a mean transaortic valve gradient of 32 mmHg. The aortic valve area is calculated to be 0.4 sq cm by continuity equation. This is consistent with moderate to severe aortic stenosis.  Tricuspid Valve: Mild TR.  Pulmonic Valve: Not well-visualized  Left Atrium: normal  Right Atrium: Not well-visualized  Left Ventricle: normal LV size and systolic function, estimated LVEF of 50-55%.  Right Ventricle: Grossly normal size and systolic function.  Pericardium: no significant pericardial effusion.  Pulmonary/RV Pressure: estimated PA systolic pressure of 32 mmHg        IMPRESSION:  Normal left ventricular internal dimensions and systolic function, estimated LVEF of 50-55%.  Grossly normal RV size and systolic function.  Calcified trileaflet aortic valve with moderate to severe aortic stenosis, without AI.  Mild mitral stenosis with mild regurgitation  Mild tricuspid regurgitation  No significant pericardial effusion.    --- End of Report ---              < end of copied text >      HEALTH ISSUES - PROBLEM Dx:  Acute exacerbation of CHF (congestive heart failure)  Atrial fibrillation  Hypertension  GERD (gastroesophageal reflux disease)  Dementia  Iron deficiency anemia  Osteoarthritis  Need for prophylactic measure    Consultant(s) Notes Reviewed:  [ x ] YES     Care Discussed with [ x ] Consultants, [ x ] Patient, [ x ] Family,- Dtr [  ] HCP, [  x] , [  ] Social Service, [x  ] RN, [  ] Physical Therapy, [  ] Palliative Care Team  DVT PPX: [  ] Lovenox, [  ] SC Heparin, [  ] Coumadin, [ x ] Xarelto, [  ] Eliquis, [  ] Pradaxa, [  ] IV Heparin drip, [  ] SCD, [  ] Ambulation, [  ] Contraindicated 2/2 GI Bleed, [  ] Contraindicated 2/2  Bleed, [  ] Contraindicated 2/2 Brain Bleed  Advanced Directive: [  ] None, [ x ] DNR/DNI

## 2021-11-20 NOTE — PROGRESS NOTE ADULT - PROBLEM SELECTOR PLAN 1
- acute on chronic CHF exacerbation  2/2 Rapid AFib, RVR  - tele- patient remains in afib with rates in 80-100s   - Lasix 40 mg IVP BID, Fluid restriction 1200 ml/.24 hrs. Held PM dose Lasix last night, restarted today as 40 mg PO daily  - Continue beta blocker- metoprolol 50 mg q 8 hrs   - TTE ordered; prelim read shows severe AS, f/u official read -ECHO- EF 50-55%  - Strict I&Os, daily weights   - Cardio (Dr osei's group consulted; f/u recommendations,

## 2021-11-20 NOTE — PROGRESS NOTE ADULT - PROBLEM SELECTOR PLAN 2
History of afib, presented to ER in RVR   -on Xarelto  -S/P IV Cardizem drip, transitioned to 30 Cardizem q6h   - On tele patient is in afib with rates in 100s   - Continue home dose of Xarelto 15 mg daily  - On Lopressor 50 mg 1 tab q 8 hrs   - start digoxin 125 mcg qod  - TTE- prelim read shows severe AS; f/u official read   - F/u thyroid panel   - Cardiology following Dr Gray History of afib, presented to ER in RVR   -on Xarelto  -S/P IV Cardizem drip, transitioned to 30 Cardizem q6h   - On tele patient is in afib with rates in 100s   - Continue home dose of Xarelto 15 mg daily  - On Lopressor 50 mg 1 tab q 8 hrs   - start digoxin 125 mcg qod  - TTE- prelim read shows severe AS; f/u official read   - F/u thyroid panel   - Cardiology following Dr Lopez

## 2021-11-21 LAB
ANION GAP SERPL CALC-SCNC: 6 MMOL/L — SIGNIFICANT CHANGE UP (ref 5–17)
BUN SERPL-MCNC: 33 MG/DL — HIGH (ref 7–23)
CALCIUM SERPL-MCNC: 9 MG/DL — SIGNIFICANT CHANGE UP (ref 8.5–10.1)
CHLORIDE SERPL-SCNC: 110 MMOL/L — HIGH (ref 96–108)
CO2 SERPL-SCNC: 28 MMOL/L — SIGNIFICANT CHANGE UP (ref 22–31)
CREAT SERPL-MCNC: 0.99 MG/DL — SIGNIFICANT CHANGE UP (ref 0.5–1.3)
GLUCOSE SERPL-MCNC: 112 MG/DL — HIGH (ref 70–99)
HCT VFR BLD CALC: 40.8 % — SIGNIFICANT CHANGE UP (ref 34.5–45)
HGB BLD-MCNC: 12.7 G/DL — SIGNIFICANT CHANGE UP (ref 11.5–15.5)
MAGNESIUM SERPL-MCNC: 2.6 MG/DL — SIGNIFICANT CHANGE UP (ref 1.6–2.6)
MCHC RBC-ENTMCNC: 27.1 PG — SIGNIFICANT CHANGE UP (ref 27–34)
MCHC RBC-ENTMCNC: 31.1 GM/DL — LOW (ref 32–36)
MCV RBC AUTO: 87 FL — SIGNIFICANT CHANGE UP (ref 80–100)
NRBC # BLD: 0 /100 WBCS — SIGNIFICANT CHANGE UP (ref 0–0)
PHOSPHATE SERPL-MCNC: 3.6 MG/DL — SIGNIFICANT CHANGE UP (ref 2.5–4.5)
PLATELET # BLD AUTO: 201 K/UL — SIGNIFICANT CHANGE UP (ref 150–400)
POTASSIUM SERPL-MCNC: 4.1 MMOL/L — SIGNIFICANT CHANGE UP (ref 3.5–5.3)
POTASSIUM SERPL-SCNC: 4.1 MMOL/L — SIGNIFICANT CHANGE UP (ref 3.5–5.3)
RBC # BLD: 4.69 M/UL — SIGNIFICANT CHANGE UP (ref 3.8–5.2)
RBC # FLD: 16.6 % — HIGH (ref 10.3–14.5)
SODIUM SERPL-SCNC: 144 MMOL/L — SIGNIFICANT CHANGE UP (ref 135–145)
WBC # BLD: 6.35 K/UL — SIGNIFICANT CHANGE UP (ref 3.8–10.5)
WBC # FLD AUTO: 6.35 K/UL — SIGNIFICANT CHANGE UP (ref 3.8–10.5)

## 2021-11-21 PROCEDURE — 71045 X-RAY EXAM CHEST 1 VIEW: CPT | Mod: 26

## 2021-11-21 PROCEDURE — 99233 SBSQ HOSP IP/OBS HIGH 50: CPT

## 2021-11-21 RX ORDER — DILTIAZEM HCL 120 MG
30 CAPSULE, EXT RELEASE 24 HR ORAL EVERY 6 HOURS
Refills: 0 | Status: COMPLETED | OUTPATIENT
Start: 2021-11-21 | End: 2021-11-21

## 2021-11-21 RX ORDER — DILTIAZEM HCL 120 MG
120 CAPSULE, EXT RELEASE 24 HR ORAL DAILY
Refills: 0 | Status: DISCONTINUED | OUTPATIENT
Start: 2021-11-22 | End: 2021-11-22

## 2021-11-21 RX ADMIN — MEMANTINE HYDROCHLORIDE 10 MILLIGRAM(S): 10 TABLET ORAL at 18:06

## 2021-11-21 RX ADMIN — Medication 125 MICROGRAM(S): at 12:24

## 2021-11-21 RX ADMIN — Medication 50 MILLIGRAM(S): at 14:34

## 2021-11-21 RX ADMIN — Medication 30 MILLIGRAM(S): at 18:06

## 2021-11-21 RX ADMIN — Medication 30 MILLIGRAM(S): at 23:46

## 2021-11-21 RX ADMIN — MEMANTINE HYDROCHLORIDE 10 MILLIGRAM(S): 10 TABLET ORAL at 05:47

## 2021-11-21 RX ADMIN — SENNA PLUS 2 TABLET(S): 8.6 TABLET ORAL at 21:21

## 2021-11-21 RX ADMIN — Medication 325 MILLIGRAM(S): at 12:17

## 2021-11-21 RX ADMIN — Medication 10 MILLIGRAM(S): at 12:17

## 2021-11-21 RX ADMIN — RIVAROXABAN 15 MILLIGRAM(S): KIT at 12:17

## 2021-11-21 RX ADMIN — PANTOPRAZOLE SODIUM 20 MILLIGRAM(S): 20 TABLET, DELAYED RELEASE ORAL at 12:17

## 2021-11-21 RX ADMIN — ZOLPIDEM TARTRATE 10 MILLIGRAM(S): 10 TABLET ORAL at 21:21

## 2021-11-21 RX ADMIN — Medication 30 MILLIGRAM(S): at 05:47

## 2021-11-21 RX ADMIN — Medication 30 MILLIGRAM(S): at 00:20

## 2021-11-21 RX ADMIN — Medication 50 MILLIGRAM(S): at 05:47

## 2021-11-21 NOTE — PROGRESS NOTE ADULT - ATTENDING COMMENTS
Patient is a 89 year old female with PMH afib on Xarelto, CHF, HTN, GERD, osteoarthritis, anxiety, iron deficiency anemia who presents to the ER with complaint of SOB, admitted for CHF exacerbation.  Pt seen, examined, case & care plan d/w pt, resident at detail.  AM Labs   PO diet   ECHO-shows Severe AS  D/W  Dtr at bed side , D/C plan in AM   Palliative Care eval for GOC. Dtr is HCP. pt is DNR/DNI  PT Eval. ---> HCPT D/W Case management.  Total care time is 40 minutes.

## 2021-11-21 NOTE — PROGRESS NOTE ADULT - ASSESSMENT
89 year old female with PMH afib on Xarelto, CHF, HTN, GERD, osteoarthritis, anxiety, iron deficiency anemia presents with decompensated heart failure      Severe AS,/ CHF/ HTN   now on lasix po   tele with afib controlled can dc tele continue po cardizem change to CD on discharge    digoxin resumed   strict intake and output   echo with moderate to severe AS, follow up outpatient with Dr Lopez for possible TAVR eval as outpatient   supplemental oxygen attempt to wean off   dc planning         Monitor and replete electrolytes. Keep K>4.0 and Mg>2.0.  Liana Oneill FNP-C  Cardiology NP  [id number] [phone]           89 year old female with PMH afib on Xarelto, CHF, HTN, GERD, osteoarthritis, anxiety, iron deficiency anemia presents with decompensated heart failure      Severe AS,/ CHF/ HTN   now on lasix po , with desaturation on RA< give additional IV lasix today repeat chest xray and reassess in am ,  tele with afib controlled on cardizem po   digoxin resumed   strict intake and output   echo with moderate to severe AS, follow up outpatient with Dr Lopez for possible TAVR eval as outpatient   supplemental oxygen attempt to wean off   dc planning         Monitor and replete electrolytes. Keep K>4.0 and Mg>2.0.  Liana Oneill FNP-C  Cardiology NP  [facility] [phone]

## 2021-11-21 NOTE — PROGRESS NOTE ADULT - SUBJECTIVE AND OBJECTIVE BOX
Date/Time Patient Seen:  		  Referring MD:   Data Reviewed	       Patient is a 89y old  Female who presents with a chief complaint of CHF exacerbation (20 Nov 2021 11:12)      Subjective/HPI     PAST MEDICAL & SURGICAL HISTORY:  Chronic atrial fibrillation    CHF, chronic    Hypertension    Osteoarthritis    Dementia    GERD (gastroesophageal reflux disease)    Iron deficiency anemia    History of cholecystectomy          Medication list         MEDICATIONS  (STANDING):  bisacodyl Suppository 10 milliGRAM(s) Rectal daily  digoxin     Tablet 125 MICROGram(s) Oral every other day  diltiazem    Tablet 30 milliGRAM(s) Oral every 6 hours  ergocalciferol 96263 Unit(s) Oral <User Schedule>  ferrous    sulfate 325 milliGRAM(s) Oral daily  furosemide    Tablet 40 milliGRAM(s) Oral daily  influenza  Vaccine (HIGH DOSE) 0.7 milliLiter(s) IntraMuscular once  memantine 10 milliGRAM(s) Oral two times a day  metoprolol tartrate 50 milliGRAM(s) Oral every 8 hours  pantoprazole    Tablet 20 milliGRAM(s) Oral daily  rivaroxaban 15 milliGRAM(s) Oral daily  senna 2 Tablet(s) Oral at bedtime  zolpidem 10 milliGRAM(s) Oral at bedtime    MEDICATIONS  (PRN):  acetaminophen     Tablet .. 650 milliGRAM(s) Oral every 6 hours PRN Temp greater or equal to 38C (100.4F), Mild Pain (1 - 3)         Vitals log        ICU Vital Signs Last 24 Hrs  T(C): 36.5 (21 Nov 2021 04:19), Max: 37 (20 Nov 2021 19:49)  T(F): 97.7 (21 Nov 2021 04:19), Max: 98.6 (20 Nov 2021 19:49)  HR: 92 (21 Nov 2021 04:19) (60 - 113)  BP: 108/64 (21 Nov 2021 05:42) (95/60 - 108/68)  BP(mean): --  ABP: --  ABP(mean): --  RR: 18 (21 Nov 2021 04:19) (18 - 20)  SpO2: 94% (21 Nov 2021 04:19) (93% - 94%)           Input and Output:  I&O's Detail    19 Nov 2021 07:01  -  20 Nov 2021 07:00  --------------------------------------------------------  IN:  Total IN: 0 mL    OUT:    Voided (mL): 700 mL  Total OUT: 700 mL    Total NET: -700 mL      20 Nov 2021 07:01  -  21 Nov 2021 06:31  --------------------------------------------------------  IN:    Oral Fluid: 120 mL  Total IN: 120 mL    OUT:  Total OUT: 0 mL    Total NET: 120 mL          Lab Data                        12.8   5.71  )-----------( 221      ( 20 Nov 2021 08:10 )             40.7     11-20    144  |  109<H>  |  33<H>  ----------------------------<  107<H>  3.9   |  29  |  1.10    Ca    8.8      20 Nov 2021 08:10  Phos  3.7     11-20  Mg     2.4     11-20              Review of Systems	      Objective     Physical Examination  heart s1s2  lung dec bS  abd soft  head nc        Pertinent Lab findings & Imaging      Leah:  NO   Adequate UO     I&O's Detail    19 Nov 2021 07:01  -  20 Nov 2021 07:00  --------------------------------------------------------  IN:  Total IN: 0 mL    OUT:    Voided (mL): 700 mL  Total OUT: 700 mL    Total NET: -700 mL      20 Nov 2021 07:01  -  21 Nov 2021 06:31  --------------------------------------------------------  IN:    Oral Fluid: 120 mL  Total IN: 120 mL    OUT:  Total OUT: 0 mL    Total NET: 120 mL               Discussed with:     Cultures:	        Radiology

## 2021-11-21 NOTE — PROGRESS NOTE ADULT - PROBLEM SELECTOR PLAN 2
History of afib, presented to ER in [facility]   -on Xarelto  -S/P IV Cardizem drip, transitioned to 30 Cardizem q6h   - On tele patient is in afib with rates in 100s   - Continue home dose of Xarelto 15 mg daily  - On Lopressor 50 mg 1 tab q 8 hrs   - C/w digoxin 125 mcg qod  - TTE- read shows severe AS, EF 50-55%  - F/u thyroid panel   - Cardiology following Dr Lopez History of afib, presented to ER in [facility]   -on Xarelto  -S/P IV Cardizem drip, transitioned to 30 Cardizem q6h , Change Cardizem  mg daily  - On tele patient is in afib with rates in 100s   - Continue home dose of Xarelto 15 mg daily  - On Lopressor 50 mg 1 tab q 8 hrs   - C/w digoxin 125 mcg qod  - TTE- read shows severe AS, EF 50-55%  - F/u thyroid panel   - Cardiology following Dr Lopez

## 2021-11-21 NOTE — PROGRESS NOTE ADULT - ASSESSMENT
pt with AF - HTN - OP - OA - Hard of Hearing - HF - valvular heart disease - on AC - now with LE edema and more sob - mayer -     on PO LASIX  DC planning  eval for TAVR as outpatient  cardio follow up reviewed    pt is DNR  TTE report severe AS    cardio eval in progress - Judicious Diuresis - I and O - cvs rx regimen and BP control - rate control for AF - TTE -     CXR reviewed - eff - atelectasis -     monitor VS and HD and Sat    GOC discussion - DNR    dietary discretion    proBNP consistent with vol overload - HF -     spoke with Daughter and Son in Law

## 2021-11-21 NOTE — PROGRESS NOTE ADULT - ATTENDING COMMENTS
at least moderate to severe AS, possibly severe  has home o2 in [place] but her usual RA sat is 95 and today was only 90  will try to give addl lasix iv x 1, and will send for cxr  hopeful that she can be dc tomorrow to complete a tavr evaluation as an outpt in our office

## 2021-11-21 NOTE — PROGRESS NOTE ADULT - SUBJECTIVE AND OBJECTIVE BOX
Patient is a 89y old  Female who presents with a chief complaint of CHF exacerbation (21 Nov 2021 06:31)  HPI: Patient is a 89 year old female with PMH afib on Xarelto, CHF, HTN, GERD, osteoarthritis, anxiety, iron deficiency anemia who presents to the ER with complaint of SOB. She has been feeling short of breath for a few months now, but her symptoms have worsened over the past week. She has been having shortness of breath with minimal exertion, and some orthopnea, but is able to tolerate lying flat. She denies any cough, congestion, fever, chills, chest pain, abd pain, diarrhea, nausea, or LE pain. She reports being chronically constipated, and is on multiple medications to help her have BMs. Since her symptoms were exacerbated, she has been noticing palpitations. She has been hospitalized for atrial fibrillation before in 2019 in [place]. She has no further complaints or concerns at this time.     In the ED:   VS: T:99, HR: 127, BP: 128/78, RR;18, SpO2: 96% on room air   Labs were significant for Cl: 112, BUN: 25, alk phos: 147, proBNP: 4782  CXR: on personal read, pulmonary vascular congestion noted   EKG: , AFib with RVR, LAD, nonspecific ST and T wave abnormality  Patient received Lasix 40 mg IVP x 1    INTERVAL HPI:  (11/18/21)- Pt seen and examined at bedside this AM. She reports improvement in her difficulty breathing and her palpitations. She appears more comfortable today as well. She has no pain, and otherwise has no complaints or concerns. On IV Lasix , Pt has Home O2 uses PRN,  (11/19/21)- Pt seen and examined at bedside this morning. States she feels well, denies any SOB or difficulty breathing, appears comfortable in bed. Palliative evaluated patient yesterday and she is DNR/DNI. Remains on IV Lasix, mild increase in Cr noted. Will hold PM dose today. Remains in afib with rates in the 100s. Started on digoxin qod as per cardio recommendations.   (11/20/21) Pt seen and examined at the bedside this AM. She denies any shortness of breath, cough, chest pain or palpitations. She has no questions or concerns at this time, and appears comfortable lying in bed. On PO Lasix.  (11/21/21) Pt seen and examined at the bedside. She feels well this morning, and does not have any chest pain, palpitations, fevers, or shortness of breath. She is wondering when she will be able to go home. Pt also denies any GI complaints.    OVERNIGHT EVENTS: No acute overnight events.    Home Medications:  Ferrex 150 Plus oral capsule: 1 cap(s) orally once a day (17 Nov 2021 13:16)  furosemide 20 mg oral tablet: 1 tab(s) orally once a day (17 Nov 2021 13:16)  memantine 28 mg oral capsule, extended release: 1 cap(s) orally once a day (17 Nov 2021 13:16)  Metoprolol Tartrate 25 mg oral tablet: 2 tab(s) orally 2 times a day (17 Nov 2021 13:16)  pantoprazole 20 mg oral delayed release tablet: 1 tab(s) orally once a day (17 Nov 2021 13:16)  Vitamin D3 1250 mcg (50,000 intl units) oral capsule: 1 cap(s) orally once a week on Monday  (17 Nov 2021 13:16)  Xarelto 15 mg oral tablet: 1 tab(s) orally once a day (in the evening) (17 Nov 2021 13:16)  zolpidem 10 mg oral tablet: 1 tab(s) orally once a day (at bedtime), As Needed (17 Nov 2021 13:16)    MEDICATIONS  (STANDING):  bisacodyl Suppository 10 milliGRAM(s) Rectal daily  digoxin     Tablet 125 MICROGram(s) Oral every other day  diltiazem    Tablet 30 milliGRAM(s) Oral every 6 hours  ergocalciferol 16684 Unit(s) Oral <User Schedule>  ferrous    sulfate 325 milliGRAM(s) Oral daily  furosemide    Tablet 40 milliGRAM(s) Oral daily  influenza  Vaccine (HIGH DOSE) 0.7 milliLiter(s) IntraMuscular once  memantine 10 milliGRAM(s) Oral two times a day  metoprolol tartrate 50 milliGRAM(s) Oral every 8 hours  pantoprazole    Tablet 20 milliGRAM(s) Oral daily  rivaroxaban 15 milliGRAM(s) Oral daily  senna 2 Tablet(s) Oral at bedtime  zolpidem 10 milliGRAM(s) Oral at bedtime    MEDICATIONS  (PRN):  acetaminophen     Tablet .. 650 milliGRAM(s) Oral every 6 hours PRN Temp greater or equal to 38C (100.4F), Mild Pain (1 - 3)    No Known Allergies    Social History:  Pt lives alone at home, receives help from an aide a few times a week for a few hours at a time to help with IADLs.  Denies EtOH, tobacco, and recreational drug use.   Uses walker to ambulate at home. (17 Nov 2021 13:14)    REVIEW OF SYSTEMS:  CONSTITUTIONAL: No fever, No chills  EYES: No eye pain, No visual disturbances, No discharge, No Redness  ENMT: No vertigo; No sinus pain, No throat pain, No Congestion  NECK: No pain, No stiffness  RESPIRATORY: No cough, No wheezing, No shortness of breath  CARDIOVASCULAR: No chest pain, No palpitations  GASTROINTESTINAL: No abdominal pain, No epigastric pain. No nausea, No vomiting, No diarrhea, No constipation; [  ] BM  GENITOURINARY: No dysuria, No frequency, No urgency, No hematuria, No incontinence  NEUROLOGICAL: No headaches, No dizziness, No weakness  EXTREMITIES: No Swelling, No Pain, No Edema  SKIN: [ x ] No itching, burning, rashes, or lesions   MUSCULOSKELETAL: No joint pain, No joint swelling; No muscle pain, No back pain, No extremity pain  PSYCHIATRIC: No depression, No anxiety, No mood swings, No difficulty sleeping at night  PAIN SCALE: [ x ] None  [  ] Other-  ROS Unable to obtain due to: [  ] Dementia  [  ] Lethargy  [  ] Sedated  [  ] Non verbal  REST OF REVIEW OF SYSTEMS: [ x ] Normal     Vital Signs Last 24 Hrs  T(C): 36.5 (21 Nov 2021 04:19), Max: 37 (20 Nov 2021 19:49)  T(F): 97.7 (21 Nov 2021 04:19), Max: 98.6 (20 Nov 2021 19:49)  HR: 92 (21 Nov 2021 04:19) (60 - 113)  BP: 108/64 (21 Nov 2021 05:42) (95/60 - 108/68)  BP(mean): --  RR: 18 (21 Nov 2021 04:19) (18 - 20)  SpO2: 94% (21 Nov 2021 04:19) (93% - 94%)    CAPILLARY BLOOD GLUCOSE    I&O's Summary    20 Nov 2021 07:01  -  21 Nov 2021 07:00  --------------------------------------------------------  IN: 120 mL / OUT: 0 mL / NET: 120 mL    21 Nov 2021 07:01  -  21 Nov 2021 10:03  --------------------------------------------------------  IN: 240 mL / OUT: 0 mL / NET: 240 mL    PHYSICAL EXAM:  GENERAL:  [ x ] NAD, [ x ] Well appearing, [  ] Agitated, [  ] Drowsy, [  ] Lethargy, [  ] Confused   HEAD:  [ x ] Normal, [  ] Other  EYES:  [ x ] EOMI, [ x ] PERRLA, [ x ] Conjunctiva and sclera clear normal, [  ] Other, [  ] Pallor, [  ] Discharge  ENMT:  [ x ] Normal, [ x ] Moist mucous membranes, [  ] Good dentition, [ x ] No thrush  NECK:  [ x ] Supple, [ x ] No JVD, [ x ] Normal thyroid, [  ] Lymphadenopathy, [  ] Other  CHEST/LUNG:  [  ] Clear to auscultation bilaterally, [ x ] Breath Sounds equal B/L / decreased, [  ] Poor effort, [ x ] Bibasilar rales, [x  ] No rhonchi, [ x ] No wheezing  HEART:  [  ] Regular rate and rhythm, [  ] Tachycardia, [  ] Bradycardia, [ x ] Irregular, [ x ] Parasternal systolic murmur, No rubs, No gallops, [  ] PPM in place (Mfr:  )  ABDOMEN:  [ x ] Soft, [ x ] Nontender, [ x ] Nondistended, [ x ] No mass, [ x ] Bowel sounds present, [  ] Obese  NERVOUS SYSTEM:  [ x ] Alert & Oriented x3, [ x ] Nonfocal, [  ] Confusion, [  ] Encephalopathic, [  ] Sedated, [  ] Unable to assess, [  ] Dementia, [  ] Other-  EXTREMITIES:  [ x ] 2+ Peripheral Pulses, No clubbing, No cyanosis,  [  ] Edema B/L lower EXT, [  ] PVD stasis skin changes B/L lower EXT, [  ] Wound  LYMPH:  No lymphadenopathy noted  SKIN:  [ x ] No rashes or lesions, [  ] Pressure ulcers, [  ] Ecchymosis, [  ] Skin tears, [  ] Other    DIET: Diet, DASH/TLC:   Sodium & Cholesterol Restricted  1000mL Fluid Restriction ([id number]) (11-17-21 @ 16:02)      LABS:                        12.7   6.35  )-----------( 201      ( 21 Nov 2021 08:17 )             40.8     21 Nov 2021 08:17    144    |  110    |  33     ----------------------------<  112    4.1     |  28     |  0.99     Ca    9.0        21 Nov 2021 08:17  Phos  3.6       21 Nov 2021 08:17  Mg     2.6       21 Nov 2021 08:17    CARDIAC MARKERS ( 17 Nov 2021 11:31 )  x     / x     / x     / x     / 1.6 ng/mL    Anemia Panel:    Thyroid Panel:  Thyroid Stimulating Hormone, Serum: 1.78 uIU/mL (11-20-21 @ 08:10)    Serum Pro-Brain Natriuretic Peptide: 4782 pg/mL (11-17-21 @ 11:31)    RADIOLOGY & ADDITIONAL TESTS:    HEALTH ISSUES - PROBLEM Dx:  Acute exacerbation of CHF (congestive heart failure)  Osteoarthritis  Need for prophylactic measure  Atrial fibrillation  Hypertension  GERD (gastroesophageal reflux disease)  Iron deficiency anemia  Dementia    Consultant(s) Notes Reviewed:  [  ] YES     Care Discussed with [ x ] Consultants, [ x ] Patient, [  ] Family, [  ] HCP, [  ] , [  ] Social Service, [  ] RN, [  ] Physical Therapy, [  ] Palliative Care Team  DVT PPX: [  ] Lovenox, [  ] SC Heparin, [  ] Coumadin, [ x ] Xarelto, [  ] Eliquis, [  ] Pradaxa, [  ] IV Heparin drip, [  ] SCD, [  ] Ambulation, [  ] Contraindicated 2/2 GI Bleed, [  ] Contraindicated 2/2  Bleed, [  ] Contraindicated 2/2 Brain Bleed  Advanced Directive: [  ] None, [ x ] DNR/DNI Patient is a 89y old  Female who presents with a chief complaint of CHF exacerbation (21 Nov 2021 06:31)  HPI: Patient is a 89 year old female with PMH afib on Xarelto, CHF, HTN, GERD, osteoarthritis, anxiety, iron deficiency anemia who presents to the ER with complaint of SOB. She has been feeling short of breath for a few months now, but her symptoms have worsened over the past week. She has been having shortness of breath with minimal exertion, and some orthopnea, but is able to tolerate lying flat. She denies any cough, congestion, fever, chills, chest pain, abd pain, diarrhea, nausea, or LE pain. She reports being chronically constipated, and is on multiple medications to help her have BMs. Since her symptoms were exacerbated, she has been noticing palpitations. She has been hospitalized for atrial fibrillation before in 2019 in [place]. She has no further complaints or concerns at this time.     In the ED:   VS: T:99, HR: 127, BP: 128/78, RR;18, SpO2: 96% on room air   Labs were significant for Cl: 112, BUN: 25, alk phos: 147, proBNP: 4782  CXR: on personal read, pulmonary vascular congestion noted   EKG: , AFib with RVR, LAD, nonspecific ST and T wave abnormality  Patient received Lasix 40 mg IVP x 1    INTERVAL HPI:  (11/18/21)- Pt seen and examined at bedside this AM. She reports improvement in her difficulty breathing and her palpitations. She appears more comfortable today as well. She has no pain, and otherwise has no complaints or concerns. On IV Lasix , Pt has Home O2 uses PRN,  (11/19/21)- Pt seen and examined at bedside this morning. States she feels well, denies any SOB or difficulty breathing, appears comfortable in bed. Palliative evaluated patient yesterday and she is DNR/DNI. Remains on IV Lasix, mild increase in Cr noted. Will hold PM dose today. Remains in afib with rates in the 100s. Started on digoxin qod as per cardio recommendations.   (11/20/21) Pt seen and examined at the bedside this AM. She denies any shortness of breath, cough, chest pain or palpitations. She has no questions or concerns at this time, and appears comfortable lying in bed. On PO Lasix.  (11/21/21) Pt seen and examined at the bedside. She feels well this morning, and does not have any chest pain, palpitations, fevers, or shortness of breath. She is wondering when she will be able to go home. Pt also denies any GI complaints.    OVERNIGHT EVENTS: No acute overnight events. On tele monitor, pt is in AFib with rates in the 90s to 100s.    Home Medications:  Ferrex 150 Plus oral capsule: 1 cap(s) orally once a day (17 Nov 2021 13:16)  furosemide 20 mg oral tablet: 1 tab(s) orally once a day (17 Nov 2021 13:16)  memantine 28 mg oral capsule, extended release: 1 cap(s) orally once a day (17 Nov 2021 13:16)  Metoprolol Tartrate 25 mg oral tablet: 2 tab(s) orally 2 times a day (17 Nov 2021 13:16)  pantoprazole 20 mg oral delayed release tablet: 1 tab(s) orally once a day (17 Nov 2021 13:16)  Vitamin D3 1250 mcg (50,000 intl units) oral capsule: 1 cap(s) orally once a week on Monday  (17 Nov 2021 13:16)  Xarelto 15 mg oral tablet: 1 tab(s) orally once a day (in the evening) (17 Nov 2021 13:16)  zolpidem 10 mg oral tablet: 1 tab(s) orally once a day (at bedtime), As Needed (17 Nov 2021 13:16)    MEDICATIONS  (STANDING):  bisacodyl Suppository 10 milliGRAM(s) Rectal daily  digoxin     Tablet 125 MICROGram(s) Oral every other day  diltiazem    Tablet 30 milliGRAM(s) Oral every 6 hours  ergocalciferol 94832 Unit(s) Oral <User Schedule>  ferrous    sulfate 325 milliGRAM(s) Oral daily  furosemide    Tablet 40 milliGRAM(s) Oral daily  influenza  Vaccine (HIGH DOSE) 0.7 milliLiter(s) IntraMuscular once  memantine 10 milliGRAM(s) Oral two times a day  metoprolol tartrate 50 milliGRAM(s) Oral every 8 hours  pantoprazole    Tablet 20 milliGRAM(s) Oral daily  rivaroxaban 15 milliGRAM(s) Oral daily  senna 2 Tablet(s) Oral at bedtime  zolpidem 10 milliGRAM(s) Oral at bedtime    MEDICATIONS  (PRN):  acetaminophen     Tablet .. 650 milliGRAM(s) Oral every 6 hours PRN Temp greater or equal to 38C (100.4F), Mild Pain (1 - 3)    No Known Allergies    Social History:  Pt lives alone at home, receives help from an aide a few times a week for a few hours at a time to help with IADLs.  Denies EtOH, tobacco, and recreational drug use.   Uses walker to ambulate at home. (17 Nov 2021 13:14)    REVIEW OF SYSTEMS:  CONSTITUTIONAL: No fever, No chills  EYES: No eye pain, No visual disturbances, No discharge, No Redness  ENMT: No vertigo; No sinus pain, No throat pain, No Congestion  NECK: No pain, No stiffness  RESPIRATORY: No cough, No wheezing, No shortness of breath  CARDIOVASCULAR: No chest pain, No palpitations  GASTROINTESTINAL: No abdominal pain, No epigastric pain. No nausea, No vomiting, No diarrhea, No constipation; [  ] BM  GENITOURINARY: No dysuria, No frequency, No urgency, No hematuria, No incontinence  NEUROLOGICAL: No headaches, No dizziness, No weakness  EXTREMITIES: No Swelling, No Pain, No Edema  SKIN: [ x ] No itching, burning, rashes, or lesions   MUSCULOSKELETAL: No joint pain, No joint swelling; No muscle pain, No back pain, No extremity pain  PSYCHIATRIC: No depression, No anxiety, No mood swings, No difficulty sleeping at night  PAIN SCALE: [ x ] None  [  ] Other-  ROS Unable to obtain due to: [  ] Dementia  [  ] Lethargy  [  ] Sedated  [  ] Non verbal  REST OF REVIEW OF SYSTEMS: [ x ] Normal     Vital Signs Last 24 Hrs  T(C): 36.5 (21 Nov 2021 04:19), Max: 37 (20 Nov 2021 19:49)  T(F): 97.7 (21 Nov 2021 04:19), Max: 98.6 (20 Nov 2021 19:49)  HR: 92 (21 Nov 2021 04:19) (60 - 113)  BP: 108/64 (21 Nov 2021 05:42) (95/60 - 108/68)  BP(mean): --  RR: 18 (21 Nov 2021 04:19) (18 - 20)  SpO2: 94% (21 Nov 2021 04:19) (93% - 94%)    CAPILLARY BLOOD GLUCOSE    I&O's Summary    20 Nov 2021 07:01  -  21 Nov 2021 07:00  --------------------------------------------------------  IN: 120 mL / OUT: 0 mL / NET: 120 mL    21 Nov 2021 07:01  -  21 Nov 2021 10:03  --------------------------------------------------------  IN: 240 mL / OUT: 0 mL / NET: 240 mL    PHYSICAL EXAM:  GENERAL:  [ x ] NAD, [ x ] Well appearing, [  ] Agitated, [  ] Drowsy, [  ] Lethargy, [  ] Confused   HEAD:  [ x ] Normal, [  ] Other  EYES:  [ x ] EOMI, [ x ] PERRLA, [ x ] Conjunctiva and sclera clear normal, [  ] Other, [  ] Pallor, [  ] Discharge  ENMT:  [ x ] Normal, [ x ] Moist mucous membranes, [  ] Good dentition, [ x ] No thrush  NECK:  [ x ] Supple, [ x ] No JVD, [ x ] Normal thyroid, [  ] Lymphadenopathy, [  ] Other  CHEST/LUNG:  [  ] Clear to auscultation bilaterally, [ x ] Breath Sounds equal B/L / decreased, [  ] Poor effort, [ x ] Bibasilar rales, [x  ] No rhonchi, [ x ] No wheezing  HEART:  [  ] Regular rate and rhythm, [  ] Tachycardia, [  ] Bradycardia, [ x ] Irregular, [ x ] Parasternal systolic murmur, No rubs, No gallops, [  ] PPM in place (Mfr:  )  ABDOMEN:  [ x ] Soft, [ x ] Nontender, [ x ] Nondistended, [ x ] No mass, [ x ] Bowel sounds present, [  ] Obese  NERVOUS SYSTEM:  [ x ] Alert & Oriented x3, [ x ] Nonfocal, [  ] Confusion, [  ] Encephalopathic, [  ] Sedated, [  ] Unable to assess, [  ] Dementia, [  ] Other-  EXTREMITIES:  [ x ] 2+ Peripheral Pulses, No clubbing, No cyanosis,  [  ] Edema B/L lower EXT, [  ] PVD stasis skin changes B/L lower EXT, [  ] Wound  LYMPH:  No lymphadenopathy noted  SKIN:  [ x ] No rashes or lesions, [  ] Pressure ulcers, [  ] Ecchymosis, [  ] Skin tears, [  ] Other    DIET: Diet, DASH/TLC:   Sodium & Cholesterol Restricted  1000mL Fluid Restriction ([id number]) (11-17-21 @ 16:02)      LABS:                        12.7   6.35  )-----------( 201      ( 21 Nov 2021 08:17 )             40.8     21 Nov 2021 08:17    144    |  110    |  33     ----------------------------<  112    4.1     |  28     |  0.99     Ca    9.0        21 Nov 2021 08:17  Phos  3.6       21 Nov 2021 08:17  Mg     2.6       21 Nov 2021 08:17    CARDIAC MARKERS ( 17 Nov 2021 11:31 )  x     / x     / x     / x     / 1.6 ng/mL    Anemia Panel:    Thyroid Panel:  Thyroid Stimulating Hormone, Serum: 1.78 uIU/mL (11-20-21 @ 08:10)    Serum Pro-Brain Natriuretic Peptide: 4782 pg/mL (11-17-21 @ 11:31)    RADIOLOGY & ADDITIONAL TESTS:    HEALTH ISSUES - PROBLEM Dx:  Acute exacerbation of CHF (congestive heart failure)  Osteoarthritis  Need for prophylactic measure  Atrial fibrillation  Hypertension  GERD (gastroesophageal reflux disease)  Iron deficiency anemia  Dementia    Consultant(s) Notes Reviewed:  [  ] YES     Care Discussed with [ x ] Consultants, [ x ] Patient, [  ] Family, [  ] HCP, [  ] , [  ] Social Service, [  ] RN, [  ] Physical Therapy, [  ] Palliative Care Team  DVT PPX: [  ] Lovenox, [  ] SC Heparin, [  ] Coumadin, [ x ] Xarelto, [  ] Eliquis, [  ] Pradaxa, [  ] IV Heparin drip, [  ] SCD, [  ] Ambulation, [  ] Contraindicated 2/2 GI Bleed, [  ] Contraindicated 2/2  Bleed, [  ] Contraindicated 2/2 Brain Bleed  Advanced Directive: [  ] None, [ x ] DNR/DNI Patient is a 89y old  Female who presents with a chief complaint of CHF exacerbation (21 Nov 2021 06:31)  HPI: Patient is a 89 year old female with PMH afib on Xarelto, CHF, HTN, GERD, osteoarthritis, anxiety, iron deficiency anemia who presents to the ER with complaint of SOB. She has been feeling short of breath for a few months now, but her symptoms have worsened over the past week. She has been having shortness of breath with minimal exertion, and some orthopnea, but is able to tolerate lying flat. She denies any cough, congestion, fever, chills, chest pain, abd pain, diarrhea, nausea, or LE pain. She reports being chronically constipated, and is on multiple medications to help her have BMs. Since her symptoms were exacerbated, she has been noticing palpitations. She has been hospitalized for atrial fibrillation before in 2019 in [place]. She has no further complaints or concerns at this time.     In the ED:   VS: T:99, HR: 127, BP: 128/78, RR;18, SpO2: 96% on room air   Labs were significant for Cl: 112, BUN: 25, alk phos: 147, proBNP: 4782  CXR: on personal read, pulmonary vascular congestion noted   EKG: , AFib with RVR, LAD, nonspecific ST and T wave abnormality  Patient received Lasix 40 mg IVP x 1    INTERVAL HPI:  (11/18/21)- Pt seen and examined at bedside this AM. She reports improvement in her difficulty breathing and her palpitations. She appears more comfortable today as well. She has no pain, and otherwise has no complaints or concerns. On IV Lasix , Pt has Home O2 uses PRN,  (11/19/21)- Pt seen and examined at bedside this morning. States she feels well, denies any SOB or difficulty breathing, appears comfortable in bed. Palliative evaluated patient yesterday and she is DNR/DNI. Remains on IV Lasix, mild increase in Cr noted. Will hold PM dose today. Remains in afib with rates in the 100s. Started on digoxin qod as per cardio recommendations.   (11/20/21) Pt seen and examined at the bedside this AM. She denies any shortness of breath, cough, chest pain or palpitations. She has no questions or concerns at this time, and appears comfortable lying in bed. On PO Lasix.  (11/21/21) Pt seen and examined at the bedside. She feels well this morning, and does not have any chest pain, palpitations, fevers, or shortness of breath. She is wondering when she will be able to go home. Pt also denies any GI complaints.    OVERNIGHT EVENTS: No acute overnight events. On tele monitor, pt is in AFib with rates in the 90s to 100s.    Home Medications:  Ferrex 150 Plus oral capsule: 1 cap(s) orally once a day (17 Nov 2021 13:16)  furosemide 20 mg oral tablet: 1 tab(s) orally once a day (17 Nov 2021 13:16)  memantine 28 mg oral capsule, extended release: 1 cap(s) orally once a day (17 Nov 2021 13:16)  Metoprolol Tartrate 25 mg oral tablet: 2 tab(s) orally 2 times a day (17 Nov 2021 13:16)  pantoprazole 20 mg oral delayed release tablet: 1 tab(s) orally once a day (17 Nov 2021 13:16)  Vitamin D3 1250 mcg (50,000 intl units) oral capsule: 1 cap(s) orally once a week on Monday  (17 Nov 2021 13:16)  Xarelto 15 mg oral tablet: 1 tab(s) orally once a day (in the evening) (17 Nov 2021 13:16)  zolpidem 10 mg oral tablet: 1 tab(s) orally once a day (at bedtime), As Needed (17 Nov 2021 13:16)    MEDICATIONS  (STANDING):  bisacodyl Suppository 10 milliGRAM(s) Rectal daily  digoxin     Tablet 125 MICROGram(s) Oral every other day  diltiazem    Tablet 30 milliGRAM(s) Oral every 6 hours  ergocalciferol 54825 Unit(s) Oral <User Schedule>  ferrous    sulfate 325 milliGRAM(s) Oral daily  furosemide    Tablet 40 milliGRAM(s) Oral daily  influenza  Vaccine (HIGH DOSE) 0.7 milliLiter(s) IntraMuscular once  memantine 10 milliGRAM(s) Oral two times a day  metoprolol tartrate 50 milliGRAM(s) Oral every 8 hours  pantoprazole    Tablet 20 milliGRAM(s) Oral daily  rivaroxaban 15 milliGRAM(s) Oral daily  senna 2 Tablet(s) Oral at bedtime  zolpidem 10 milliGRAM(s) Oral at bedtime    MEDICATIONS  (PRN):  acetaminophen     Tablet .. 650 milliGRAM(s) Oral every 6 hours PRN Temp greater or equal to 38C (100.4F), Mild Pain (1 - 3)    No Known Allergies    Social History:  Pt lives alone at home, receives help from an aide a few times a week for a few hours at a time to help with IADLs.  Denies EtOH, tobacco, and recreational drug use.   Uses walker to ambulate at home. (17 Nov 2021 13:14)    REVIEW OF SYSTEMS: i am OK  CONSTITUTIONAL: No fever, No chills  EYES: No eye pain, No visual disturbances, No discharge, No Redness  ENMT: No vertigo; No sinus pain, No throat pain, No Congestion  NECK: No pain, No stiffness  RESPIRATORY: No cough, No wheezing, No shortness of breath  CARDIOVASCULAR: No chest pain, No palpitations  GASTROINTESTINAL: No abdominal pain, No epigastric pain. No nausea, No vomiting, No diarrhea, No constipation; [  ] BM  GENITOURINARY: No dysuria, No frequency, No urgency, No hematuria, No incontinence  NEUROLOGICAL: No headaches, No dizziness, No weakness  EXTREMITIES: No Swelling, No Pain, No Edema  SKIN: [ x ] No itching, burning, rashes, or lesions   MUSCULOSKELETAL: No joint pain, No joint swelling; No muscle pain, No back pain, No extremity pain  PSYCHIATRIC: No depression, No anxiety, No mood swings, No difficulty sleeping at night  PAIN SCALE: [ x ] None  [  ] Other-  ROS Unable to obtain due to: [  ] Dementia  [  ] Lethargy  [  ] Sedated  [  ] Non verbal  REST OF REVIEW OF SYSTEMS: [ x ] Normal     Vital Signs Last 24 Hrs  T(C): 36.5 (21 Nov 2021 04:19), Max: 37 (20 Nov 2021 19:49)  T(F): 97.7 (21 Nov 2021 04:19), Max: 98.6 (20 Nov 2021 19:49)  HR: 92 (21 Nov 2021 04:19) (60 - 113)  BP: 108/64 (21 Nov 2021 05:42) (95/60 - 108/68)  BP(mean): --  RR: 18 (21 Nov 2021 04:19) (18 - 20)  SpO2: 94% (21 Nov 2021 04:19) (93% - 94%)    CAPILLARY BLOOD GLUCOSE    I&O's Summary    20 Nov 2021 07:01  -  21 Nov 2021 07:00  --------------------------------------------------------  IN: 120 mL / OUT: 0 mL / NET: 120 mL    21 Nov 2021 07:01  -  21 Nov 2021 10:03  --------------------------------------------------------  IN: 240 mL / OUT: 0 mL / NET: 240 mL    PHYSICAL EXAM:  GENERAL:  [ x ] NAD, [ x ] Well appearing, [  ] Agitated, [  ] Drowsy, [  ] Lethargy, [  ] Confused   HEAD:  [ x ] Normal, [  ] Other  EYES:  [ x ] EOMI, [ x ] PERRLA, [ x ] Conjunctiva and sclera clear normal, [  ] Other, [  ] Pallor, [  ] Discharge  ENMT:  [ x ] Normal, [ x ] Moist mucous membranes, [  ] Good dentition, [ x ] No thrush  NECK:  [ x ] Supple, [ x ] No JVD, [ x ] Normal thyroid, [  ] Lymphadenopathy, [  ] Other  CHEST/LUNG:  [  ] Clear to auscultation bilaterally, [ x ] Breath Sounds equal B/L / decreased, [  ] Poor effort, [ x ] Bibasilar rales, [x  ] No rhonchi, [ x ] No wheezing  HEART:  [  ] Regular rate and rhythm, [  ] Tachycardia, [  ] Bradycardia, [ x ] Irregular, [ x ] Parasternal systolic murmur, No rubs, No gallops, [  ] PPM in place (Mfr:  )  ABDOMEN:  [ x ] Soft, [ x ] Nontender, [ x ] Nondistended, [ x ] No mass, [ x ] Bowel sounds present, [  ] Obese  NERVOUS SYSTEM:  [ x ] Alert & Oriented x3, [ x ] Nonfocal, [  ] Confusion, [  ] Encephalopathic, [  ] Sedated, [  ] Unable to assess, [  ] Dementia, [  ] Other-  EXTREMITIES:  [ x ] 2+ Peripheral Pulses, No clubbing, No cyanosis,  [  ] Edema B/L lower EXT, [  ] PVD stasis skin changes B/L lower EXT, [  ] Wound  LYMPH:  No lymphadenopathy noted  SKIN:  [ x ] No rashes or lesions, [  ] Pressure ulcers, [  ] Ecchymosis, [  ] Skin tears, [  ] Other    DIET: Diet, DASH/TLC:   Sodium & Cholesterol Restricted  1000mL Fluid Restriction ([id number]) (11-17-21 @ 16:02)      LABS:                        12.7   6.35  )-----------( 201      ( 21 Nov 2021 08:17 )             40.8     21 Nov 2021 08:17    144    |  110    |  33     ----------------------------<  112    4.1     |  28     |  0.99     Ca    9.0        21 Nov 2021 08:17  Phos  3.6       21 Nov 2021 08:17  Mg     2.6       21 Nov 2021 08:17    CARDIAC MARKERS ( 17 Nov 2021 11:31 )  x     / x     / x     / x     / 1.6 ng/mL    Anemia Panel:    Thyroid Panel:  Thyroid Stimulating Hormone, Serum: 1.78 uIU/mL (11-20-21 @ 08:10)    Serum Pro-Brain Natriuretic Peptide: 4782 pg/mL (11-17-21 @ 11:31)    RADIOLOGY & ADDITIONAL TESTS: NONE    HEALTH ISSUES - PROBLEM Dx:  Acute exacerbation of CHF (congestive heart failure)  Osteoarthritis  Need for prophylactic measure  Atrial fibrillation  Hypertension  GERD (gastroesophageal reflux disease)  Iron deficiency anemia  Dementia    Consultant(s) Notes Reviewed:  [  ] YES     Care Discussed with [ x ] Consultants, [ x ] Patient, [x  ] Family- dtr, [  ] HCP, [  ] , [ x ] Social Service, [ x ] RN, [  ] Physical Therapy, [  ] Palliative Care Team  DVT PPX: [  ] Lovenox, [  ] SC Heparin, [  ] Coumadin, [ x ] Xarelto, [  ] Eliquis, [  ] Pradaxa, [  ] IV Heparin drip, [  ] SCD, [  ] Ambulation, [  ] Contraindicated 2/2 GI Bleed, [  ] Contraindicated 2/2  Bleed, [  ] Contraindicated 2/2 Brain Bleed  Advanced Directive: [  ] None, [ x ] DNR/DNI

## 2021-11-21 NOTE — PROGRESS NOTE ADULT - PROBLEM SELECTOR PLAN 1
- acute on chronic CHF exacerbation  2/2 Rapid AFib, RVR  - tele- patient remains in afib with rates in 80-100s   - Continue Lasix 40 mg PO daily, fluid restriction 1.2L/24 hours  - Continue beta blocker- metoprolol 50 mg q 8 hrs   - TTE ordered; prelim read shows severe AS, f/u official read -ECHO- EF 50-55%  - Strict I&Os, daily weights   - Cardio (Dr osei's group consulted; f/u recommendations,

## 2021-11-21 NOTE — PROGRESS NOTE ADULT - SUBJECTIVE AND OBJECTIVE BOX
[facility] -- Sage Monzon, Isaac, Jessica, Lonny Alegre Savella  Office # [phone]      Follow Up:    as chf afib   Subjective/Observations:   No events overnight resting comfortably in bed.  No complaints of chest pain, dyspnea, or palpitations reported. No signs of orthopnea or PND.  remains on nasal cannula     REVIEW OF SYSTEMS: All other review of systems is negative unless indicated above    PAST MEDICAL & SURGICAL HISTORY:  Chronic atrial fibrillation    CHF, chronic    Hypertension    Osteoarthritis    Dementia    GERD (gastroesophageal reflux disease)    Iron deficiency anemia    History of cholecystectomy        MEDICATIONS  (STANDING):  bisacodyl Suppository 10 milliGRAM(s) Rectal daily  digoxin     Tablet 125 MICROGram(s) Oral every other day  diltiazem    Tablet 30 milliGRAM(s) Oral every 6 hours  ergocalciferol 40643 Unit(s) Oral <User Schedule>  ferrous    sulfate 325 milliGRAM(s) Oral daily  furosemide    Tablet 40 milliGRAM(s) Oral daily  influenza  Vaccine (HIGH DOSE) 0.7 milliLiter(s) IntraMuscular once  memantine 10 milliGRAM(s) Oral two times a day  metoprolol tartrate 50 milliGRAM(s) Oral every 8 hours  pantoprazole    Tablet 20 milliGRAM(s) Oral daily  rivaroxaban 15 milliGRAM(s) Oral daily  senna 2 Tablet(s) Oral at bedtime  zolpidem 10 milliGRAM(s) Oral at bedtime    MEDICATIONS  (PRN):  acetaminophen     Tablet .. 650 milliGRAM(s) Oral every 6 hours PRN Temp greater or equal to 38C (100.4F), Mild Pain (1 - 3)      Allergies    No Known Allergies    Intolerances        Vital Signs Last 24 Hrs  T(C): 36.5 (21 Nov 2021 04:19), Max: 37 (20 Nov 2021 19:49)  T(F): 97.7 (21 Nov 2021 04:19), Max: 98.6 (20 Nov 2021 19:49)  HR: 92 (21 Nov 2021 04:19) (60 - 113)  BP: 108/64 (21 Nov 2021 05:42) (95/60 - 108/68)  BP(mean): --  RR: 18 (21 Nov 2021 04:19) (18 - 20)  SpO2: 94% (21 Nov 2021 04:19) (93% - 94%)    I&O's Summary    20 Nov 2021 07:01  -  21 Nov 2021 07:00  --------------------------------------------------------  IN: 120 mL / OUT: 0 mL / NET: 120 mL    21 Nov 2021 07:01  -  21 Nov 2021 10:53  --------------------------------------------------------  IN: 240 mL / OUT: 0 mL / NET: 240 mL          PHYSICAL EXAM:  TELE: af  Constitutional: NAD, awake and alert, frail appearing  HEENT: Moist Mucous Membranes, Anicteric  Pulmonary: Non-labored, breath sounds are clear bilaterally, No wheezing, crackles or rhonchi  Cardiovascular: IRRegular, S1 and S2 nl,BUD   Gastrointestinal: Bowel Sounds present, soft, nontender.   Lymph: No lymphadenopathy. No peripheral edema.  Skin: No visible rashes or ulcers.  Psych:  Mood & affect appropriate    LABS: All Labs Reviewed:                        12.7   6.35  )-----------( 201      ( 21 Nov 2021 08:17 )             40.8                         12.8   5.71  )-----------( 221      ( 20 Nov 2021 08:10 )             40.7                         13.5   7.50  )-----------( 230      ( 19 Nov 2021 08:02 )             41.4     21 Nov 2021 08:17    144    |  110    |  33     ----------------------------<  112    4.1     |  28     |  0.99   20 Nov 2021 08:10    144    |  109    |  33     ----------------------------<  107    3.9     |  29     |  1.10   19 Nov 2021 08:02    144    |  109    |  26     ----------------------------<  115    4.5     |  25     |  1.30     Ca    9.0        21 Nov 2021 08:17  Ca    8.8        20 Nov 2021 08:10  Ca    9.1        19 Nov 2021 08:02  Phos  3.6       21 Nov 2021 08:17  Phos  3.7       20 Nov 2021 08:10  Phos  3.4       19 Nov 2021 08:02  Mg     2.6       21 Nov 2021 08:17  Mg     2.4       20 Nov 2021 08:10  Mg     2.4       19 Nov 2021 08:02               ECG:    Echo:    Radiology:

## 2021-11-22 ENCOUNTER — TRANSCRIPTION ENCOUNTER (OUTPATIENT)
Age: 86
End: 2021-11-22

## 2021-11-22 VITALS
RESPIRATION RATE: 18 BRPM | SYSTOLIC BLOOD PRESSURE: 100 MMHG | TEMPERATURE: 98 F | DIASTOLIC BLOOD PRESSURE: 62 MMHG | HEART RATE: 66 BPM | OXYGEN SATURATION: 92 %

## 2021-11-22 DIAGNOSIS — I35.0 NONRHEUMATIC AORTIC (VALVE) STENOSIS: ICD-10-CM

## 2021-11-22 LAB
ANION GAP SERPL CALC-SCNC: 4 MMOL/L — LOW (ref 5–17)
BUN SERPL-MCNC: 27 MG/DL — HIGH (ref 7–23)
CALCIUM SERPL-MCNC: 8.8 MG/DL — SIGNIFICANT CHANGE UP (ref 8.5–10.1)
CHLORIDE SERPL-SCNC: 113 MMOL/L — HIGH (ref 96–108)
CO2 SERPL-SCNC: 30 MMOL/L — SIGNIFICANT CHANGE UP (ref 22–31)
CREAT SERPL-MCNC: 1 MG/DL — SIGNIFICANT CHANGE UP (ref 0.5–1.3)
GLUCOSE SERPL-MCNC: 113 MG/DL — HIGH (ref 70–99)
HCT VFR BLD CALC: 40.8 % — SIGNIFICANT CHANGE UP (ref 34.5–45)
HGB BLD-MCNC: 12.8 G/DL — SIGNIFICANT CHANGE UP (ref 11.5–15.5)
MAGNESIUM SERPL-MCNC: 2.3 MG/DL — SIGNIFICANT CHANGE UP (ref 1.6–2.6)
MCHC RBC-ENTMCNC: 26.8 PG — LOW (ref 27–34)
MCHC RBC-ENTMCNC: 31.4 GM/DL — LOW (ref 32–36)
MCV RBC AUTO: 85.4 FL — SIGNIFICANT CHANGE UP (ref 80–100)
NRBC # BLD: 0 /100 WBCS — SIGNIFICANT CHANGE UP (ref 0–0)
PHOSPHATE SERPL-MCNC: 3.1 MG/DL — SIGNIFICANT CHANGE UP (ref 2.5–4.5)
PLATELET # BLD AUTO: 219 K/UL — SIGNIFICANT CHANGE UP (ref 150–400)
POTASSIUM SERPL-MCNC: 3.9 MMOL/L — SIGNIFICANT CHANGE UP (ref 3.5–5.3)
POTASSIUM SERPL-SCNC: 3.9 MMOL/L — SIGNIFICANT CHANGE UP (ref 3.5–5.3)
RBC # BLD: 4.78 M/UL — SIGNIFICANT CHANGE UP (ref 3.8–5.2)
RBC # FLD: 16.4 % — HIGH (ref 10.3–14.5)
SODIUM SERPL-SCNC: 147 MMOL/L — HIGH (ref 135–145)
WBC # BLD: 5.17 K/UL — SIGNIFICANT CHANGE UP (ref 3.8–10.5)
WBC # FLD AUTO: 5.17 K/UL — SIGNIFICANT CHANGE UP (ref 3.8–10.5)

## 2021-11-22 PROCEDURE — 85027 COMPLETE CBC AUTOMATED: CPT

## 2021-11-22 PROCEDURE — 71045 X-RAY EXAM CHEST 1 VIEW: CPT

## 2021-11-22 PROCEDURE — 93005 ELECTROCARDIOGRAM TRACING: CPT

## 2021-11-22 PROCEDURE — 85025 COMPLETE CBC W/AUTO DIFF WBC: CPT

## 2021-11-22 PROCEDURE — 84484 ASSAY OF TROPONIN QUANT: CPT

## 2021-11-22 PROCEDURE — 80048 BASIC METABOLIC PNL TOTAL CA: CPT

## 2021-11-22 PROCEDURE — 84443 ASSAY THYROID STIM HORMONE: CPT

## 2021-11-22 PROCEDURE — 83735 ASSAY OF MAGNESIUM: CPT

## 2021-11-22 PROCEDURE — 85730 THROMBOPLASTIN TIME PARTIAL: CPT

## 2021-11-22 PROCEDURE — 90662 IIV NO PRSV INCREASED AG IM: CPT

## 2021-11-22 PROCEDURE — 80053 COMPREHEN METABOLIC PANEL: CPT

## 2021-11-22 PROCEDURE — U0003: CPT

## 2021-11-22 PROCEDURE — 97530 THERAPEUTIC ACTIVITIES: CPT

## 2021-11-22 PROCEDURE — 86769 SARS-COV-2 COVID-19 ANTIBODY: CPT

## 2021-11-22 PROCEDURE — 84481 FREE ASSAY (FT-3): CPT

## 2021-11-22 PROCEDURE — 93306 TTE W/DOPPLER COMPLETE: CPT

## 2021-11-22 PROCEDURE — 96374 THER/PROPH/DIAG INJ IV PUSH: CPT

## 2021-11-22 PROCEDURE — 84439 ASSAY OF FREE THYROXINE: CPT

## 2021-11-22 PROCEDURE — 84100 ASSAY OF PHOSPHORUS: CPT

## 2021-11-22 PROCEDURE — 83880 ASSAY OF NATRIURETIC PEPTIDE: CPT

## 2021-11-22 PROCEDURE — 82553 CREATINE MB FRACTION: CPT

## 2021-11-22 PROCEDURE — 97116 GAIT TRAINING THERAPY: CPT

## 2021-11-22 PROCEDURE — U0005: CPT

## 2021-11-22 PROCEDURE — 36415 COLL VENOUS BLD VENIPUNCTURE: CPT

## 2021-11-22 PROCEDURE — 99285 EMERGENCY DEPT VISIT HI MDM: CPT

## 2021-11-22 PROCEDURE — 97162 PT EVAL MOD COMPLEX 30 MIN: CPT

## 2021-11-22 PROCEDURE — 99232 SBSQ HOSP IP/OBS MODERATE 35: CPT

## 2021-11-22 PROCEDURE — 80162 ASSAY OF DIGOXIN TOTAL: CPT

## 2021-11-22 PROCEDURE — 85610 PROTHROMBIN TIME: CPT

## 2021-11-22 RX ORDER — POTASSIUM CHLORIDE 20 MEQ
1 PACKET (EA) ORAL
Qty: 15 | Refills: 0
Start: 2021-11-22 | End: 2021-12-21

## 2021-11-22 RX ORDER — FUROSEMIDE 40 MG
1 TABLET ORAL
Qty: 30 | Refills: 0
Start: 2021-11-22 | End: 2021-12-21

## 2021-11-22 RX ORDER — FUROSEMIDE 40 MG
1 TABLET ORAL
Qty: 0 | Refills: 0 | DISCHARGE

## 2021-11-22 RX ORDER — DIGOXIN 250 MCG
1 TABLET ORAL
Qty: 15 | Refills: 0
Start: 2021-11-22 | End: 2021-12-21

## 2021-11-22 RX ORDER — FONDAPARINUX SODIUM 2.5 MG/.5ML
1 INJECTION, SOLUTION SUBCUTANEOUS
Qty: 0 | Refills: 0 | DISCHARGE

## 2021-11-22 RX ORDER — FUROSEMIDE 40 MG
1 TABLET ORAL
Qty: 0 | Refills: 0 | DISCHARGE
Start: 2021-11-22 | End: 2021-12-21

## 2021-11-22 RX ORDER — FUROSEMIDE 40 MG
40 TABLET ORAL DAILY
Refills: 0 | Status: DISCONTINUED | OUTPATIENT
Start: 2021-11-23 | End: 2021-11-22

## 2021-11-22 RX ORDER — METOPROLOL TARTRATE 50 MG
2 TABLET ORAL
Qty: 0 | Refills: 0 | DISCHARGE

## 2021-11-22 RX ORDER — DILTIAZEM HCL 120 MG
1 CAPSULE, EXT RELEASE 24 HR ORAL
Qty: 30 | Refills: 0
Start: 2021-11-22 | End: 2021-12-21

## 2021-11-22 RX ORDER — FUROSEMIDE 40 MG
40 TABLET ORAL ONCE
Refills: 0 | Status: COMPLETED | OUTPATIENT
Start: 2021-11-22 | End: 2021-11-22

## 2021-11-22 RX ORDER — METOPROLOL TARTRATE 50 MG
1 TABLET ORAL
Qty: 90 | Refills: 0
Start: 2021-11-22 | End: 2021-12-21

## 2021-11-22 RX ORDER — RIVAROXABAN 15 MG-20MG
1 KIT ORAL
Qty: 0 | Refills: 0 | DISCHARGE
Start: 2021-11-22

## 2021-11-22 RX ADMIN — PANTOPRAZOLE SODIUM 20 MILLIGRAM(S): 20 TABLET, DELAYED RELEASE ORAL at 11:31

## 2021-11-22 RX ADMIN — Medication 50 MILLIGRAM(S): at 05:10

## 2021-11-22 RX ADMIN — MEMANTINE HYDROCHLORIDE 10 MILLIGRAM(S): 10 TABLET ORAL at 05:10

## 2021-11-22 RX ADMIN — Medication 325 MILLIGRAM(S): at 11:31

## 2021-11-22 RX ADMIN — ERGOCALCIFEROL 50000 UNIT(S): 1.25 CAPSULE ORAL at 11:31

## 2021-11-22 RX ADMIN — Medication 40 MILLIGRAM(S): at 11:56

## 2021-11-22 RX ADMIN — INFLUENZA VIRUS VACCINE 0.7 MILLILITER(S): 15; 15; 15; 15 SUSPENSION INTRAMUSCULAR at 12:39

## 2021-11-22 RX ADMIN — Medication 120 MILLIGRAM(S): at 05:10

## 2021-11-22 RX ADMIN — Medication 10 MILLIGRAM(S): at 11:31

## 2021-11-22 RX ADMIN — RIVAROXABAN 15 MILLIGRAM(S): KIT at 11:32

## 2021-11-22 NOTE — PHARMACOTHERAPY INTERVENTION NOTE - COMMENTS
CHF Patient Transitions of Care Counseling  Counseled by ([facility] name): Leon Denise, Pharm. D.   Person(s) counseled:Daughter at bedside  Counseling materials provided/counseling aids used:  [email or web address] plan, Micromedex [place]otes  Time spent Counselin minutes  Counseling provided according to ASHP guidelines including side effects  Notes:  Daughter expressed competency in disease state and general understanding of medications
Removed titration instructions and kept diltiazem drip at 5mg/hr per telephone order from Dr. Junior.  (Patient on Telemetry Unit, titration not permitted)

## 2021-11-22 NOTE — PROGRESS NOTE ADULT - PROBLEM SELECTOR PLAN 8
DVT ppx: On Xarelto 15 mg daily for AFib    Dispo: Home with home PT, outpt f/u for TAVR evaluation Chronic, stable, pt not complaining of any joint pain  - Tylenol prn for pain

## 2021-11-22 NOTE — PROGRESS NOTE ADULT - PROBLEM SELECTOR PLAN 1
- acute on chronic CHF exacerbation  2/2 Rapid AFib, RVR  - tele- patient remains in afib with rates in 80-100s   - Continue Lasix 40 mg PO daily, fluid restriction 1.2L/24 hours  - Continue beta blocker- metoprolol 50 mg q 8 hrs   - TTE ordered; prelim read shows severe AS, f/u official read -ECHO- EF 50-55%  - Strict I&Os, daily weights   - Cardio (Dr osei's group consulted; f/u recommendations, - acute on chronic CHF exacerbation  2/2 Rapid AFib, RVR  - tele- patient remains in afib with rates in 80-100s   - Continue Lasix 40 mg PO daily, fluid restriction 1.2L/24 hours  - Continue beta blocker- metoprolol 50 mg q 8 hrs   - TTE ordered; prelim read shows severe AS, f/u official read -ECHO- EF 50-55%  - Strict I&Os, daily weights   - Cardio (Dr osei's group consulted; f/u recommendation - acute on chronic Diastolic  CHF exacerbation  2/2 Rapid AFib, RVR & Severe AS   - tele- patient remains in afib with rates in 80-100s   - Continue Lasix 40 mg PO daily, fluid restriction 1.2L/24 hours  - Continue beta blocker- metoprolol 50 mg q 8 hrs   - TTE ordered; prelim read shows severe AS, f/u official read -ECHO- EF 50-55%  - Strict I&Os, daily weights   - Cardio (Dr osei's group D/W DR Alegre -d/w stable for d/c

## 2021-11-22 NOTE — PROGRESS NOTE ADULT - PROBLEM SELECTOR PLAN 7
Chronic, stable, pt not complaining of any joint pain  - Tylenol prn for pain Chronic, stable  - Hb 12.0 on admission  - Continue home ferrous sulfate

## 2021-11-22 NOTE — PROGRESS NOTE ADULT - PROBLEM SELECTOR PLAN 4
Chronic, stable   - Continue pantoprazole 20 mg daily Chronic, stable   - Continue metoprolol 50 mg q 8 hrs with hold parameters  - Continue Cardizem  mg daily  with hold parameters  - Monitor routine hemodynamics

## 2021-11-22 NOTE — PROGRESS NOTE ADULT - PROBLEM SELECTOR PLAN 3
Chronic, stable   - Continue metoprolol 50 mg q 8 hrs with hold parameters  - Continue Cardizem 30 mg q6h with hold parameters  - Monitor routine hemodynamics Moderate to severe  AS on ECHO  Out pt follow up with DR Lopez for TAVR Eval.  On Lasix 40 mg daily

## 2021-11-22 NOTE — PROGRESS NOTE ADULT - PROBLEM SELECTOR PROBLEM 1
Acute exacerbation of CHF (congestive heart failure)

## 2021-11-22 NOTE — PROGRESS NOTE ADULT - PROVIDER SPECIALTY LIST ADULT
Pulmonology
Cardiology
Pulmonology
Cardiology
Cardiology
Pulmonology
Pulmonology
Cardiology
Cardiology
Pulmonology
Internal Medicine

## 2021-11-22 NOTE — PROGRESS NOTE ADULT - SUBJECTIVE AND OBJECTIVE BOX
Date/Time Patient Seen:  		  Referring MD:   Data Reviewed	       Patient is a 89y old  Female who presents with a chief complaint of CHF exacerbation (21 Nov 2021 10:53)      Subjective/HPI     PAST MEDICAL & SURGICAL HISTORY:  Chronic atrial fibrillation    CHF, chronic    Hypertension    Osteoarthritis    Dementia    GERD (gastroesophageal reflux disease)    Iron deficiency anemia    History of cholecystectomy          Medication list         MEDICATIONS  (STANDING):  bisacodyl Suppository 10 milliGRAM(s) Rectal daily  digoxin     Tablet 125 MICROGram(s) Oral every other day  diltiazem    milliGRAM(s) Oral daily  ergocalciferol 42646 Unit(s) Oral <User Schedule>  ferrous    sulfate 325 milliGRAM(s) Oral daily  furosemide    Tablet 40 milliGRAM(s) Oral daily  influenza  Vaccine (HIGH DOSE) 0.7 milliLiter(s) IntraMuscular once  memantine 10 milliGRAM(s) Oral two times a day  metoprolol tartrate 50 milliGRAM(s) Oral every 8 hours  pantoprazole    Tablet 20 milliGRAM(s) Oral daily  rivaroxaban 15 milliGRAM(s) Oral daily  senna 2 Tablet(s) Oral at bedtime  zolpidem 10 milliGRAM(s) Oral at bedtime    MEDICATIONS  (PRN):  acetaminophen     Tablet .. 650 milliGRAM(s) Oral every 6 hours PRN Temp greater or equal to 38C (100.4F), Mild Pain (1 - 3)         Vitals log        ICU Vital Signs Last 24 Hrs  T(C): 36.6 (22 Nov 2021 05:08), Max: 36.8 (21 Nov 2021 19:17)  T(F): 97.9 (22 Nov 2021 05:08), Max: 98.3 (21 Nov 2021 19:17)  HR: 110 (22 Nov 2021 05:08) (84 - 110)  BP: 102/72 (22 Nov 2021 05:08) (90/62 - 104/70)  BP(mean): --  ABP: --  ABP(mean): --  RR: 19 (22 Nov 2021 05:08) (19 - 19)  SpO2: 92% (22 Nov 2021 05:08) (90% - 94%)           Input and Output:  I&O's Detail    20 Nov 2021 07:01  -  21 Nov 2021 07:00  --------------------------------------------------------  IN:    Oral Fluid: 120 mL  Total IN: 120 mL    OUT:  Total OUT: 0 mL    Total NET: 120 mL      21 Nov 2021 07:01  -  22 Nov 2021 06:27  --------------------------------------------------------  IN:    Oral Fluid: 240 mL  Total IN: 240 mL    OUT:  Total OUT: 0 mL    Total NET: 240 mL          Lab Data                        12.7   6.35  )-----------( 201      ( 21 Nov 2021 08:17 )             40.8     11-21    144  |  110<H>  |  33<H>  ----------------------------<  112<H>  4.1   |  28  |  0.99    Ca    9.0      21 Nov 2021 08:17  Phos  3.6     11-21  Mg     2.6     11-21              Review of Systems	      Objective     Physical Examination  heart s1s2  lung dc BS  abd soft  head nc  verbal        Pertinent Lab findings & Imaging      Leah:  NO   Adequate UO     I&O's Detail    20 Nov 2021 07:01  -  21 Nov 2021 07:00  --------------------------------------------------------  IN:    Oral Fluid: 120 mL  Total IN: 120 mL    OUT:  Total OUT: 0 mL    Total NET: 120 mL      21 Nov 2021 07:01  -  22 Nov 2021 06:27  --------------------------------------------------------  IN:    Oral Fluid: 240 mL  Total IN: 240 mL    OUT:  Total OUT: 0 mL    Total NET: 240 mL               Discussed with:     Cultures:	        Radiology

## 2021-11-22 NOTE — PROGRESS NOTE ADULT - ATTENDING COMMENTS
Patient is a 89 year old female with PMH afib on Xarelto, CHF, HTN, GERD, osteoarthritis, anxiety, iron deficiency anemia who presents to the ER with complaint of SOB, admitted for CHF exacerbation.  Pt seen, examined, case & care plan d/w pt, resident at detail.  AM Labs   PO diet   ECHO-shows Severe AS  D/W  Dtr at bed side , D/C plan in AM   Palliative Care eval for GOC. Dtr is HCP. pt is DNR/DNI  PT Eval. ---> HCPT D/W Case management.  Total care time is 40 minutes. Patient is a 89 year old female with PMH afib on Xarelto, CHF, HTN, GERD, osteoarthritis, anxiety, iron deficiency anemia who presents to the ER with complaint of SOB, admitted for CHF exacerbation.  Pt seen, examined, case & care plan d/w pt, [facility]  PO diet - Fluid restriction 1 lit/24 hrs   ECHO-shows Severe AS  D/W  Dtr at bed side , D/C Home today   Palliative Care eval for GOC. Dtr is HCP. pt is DNR/DNI  PT Eval. --->  D/W Case management.out pt PT  Total  D/C care time is 60 minutes.

## 2021-11-22 NOTE — PROGRESS NOTE ADULT - PROBLEM SELECTOR PLAN 2
History of afib, presented to ER in RVR   -on Xarelto  -S/P IV Cardizem drip, transitioned to 30 Cardizem q6h , Change Cardizem  mg daily  - On tele patient is in afib with rates in 100s   - Continue home dose of Xarelto 15 mg daily  - On Lopressor 50 mg 1 tab q 8 hrs   - C/w digoxin 125 mcg qod  - TTE- read shows severe AS, EF 50-55%  - F/u thyroid panel   - Cardiology following Dr Lopez History of afib, presented to ER in RVR   -on Xarelto  -S/P IV Cardizem drip, transitioned to 30 Cardizem q6h , Change Cardizem  mg daily  - On tele patient is in afib with rates in 100s   - Continue home dose of Xarelto 15 mg daily  - On Lopressor 50 mg 1 tab q 8 hrs   - C/w digoxin 125 mcg qod  - TTE- read shows severe AS, EF 50-55%  - F/u thyroid panel   - Cardiology following Dr Lopez- out pt follow up

## 2021-11-22 NOTE — PROGRESS NOTE ADULT - ASSESSMENT
89 year old female with PMH afib on Xarelto, CHF, HTN, GERD, osteoarthritis, anxiety, iron deficiency anemia presents with decompensated heart failure    Severe AS,/ CHF/ HTN   - Patient appears compensated this AM from HF POV, on room air  - S/P IV Lasix now Lasix 40 mg po qd however dose held this am per BP parameters, monitor for now  - Echo with moderate to severe AS, follow up outpatient with Dr Lopez for possible TAVR eval as outpatient   - Strict intake and output  - BP: 102/72 (11-22-21 @ 05:08) (90/62 - 104/70)    AFib  - tele with Afib 90-110s  - Continue Cardizem po 120 qd  - Continue Digoxin   - Continue BB    - Monitor and replete lytes, keep K>4, Mg>2.  - All other medical needs as per primary team.  - Other cardiovascular workup will depend on clinical course.  - Will continue to follow.    Henna White, MS ANP, AGACNP  Nurse Practitioner- Cardiology   Spectra #[phone]     89 year old female with PMH afib on Xarelto, CHF, HTN, GERD, osteoarthritis, anxiety, iron deficiency anemia presents with decompensated heart failure    Severe AS,/ CHF/ HTN   - Patient appears compensated this AM from HF POV, on room air  - S/P IV Lasix now Lasix 40 mg po qd however dose held this am per BP parameters, monitor for now  - Echo with moderate to severe AS, follow up outpatient with Dr Lopez for possible TAVR eval as outpatient   - Strict intake and output  - BP: 102/72 (11-22-21 @ 05:08) (90/62 - 104/70)    AFib  - tele with Afib 90-110s  - Continue Cardizem po 120 qd  - Continue Digoxin   - Continue BB  - Continue Xarelto    - Monitor and replete lytes, keep K>4, Mg>2.  - All other medical needs as per primary team.  - Other cardiovascular workup will depend on clinical course.  - Will continue to follow.    Henna White, MS ANP, AGACNP  Nurse Practitioner- Cardiology   Spectra #[phone]

## 2021-11-22 NOTE — PROGRESS NOTE ADULT - SUBJECTIVE AND OBJECTIVE BOX
[facility] -- Sage Monzon, Jessica Gray, Lonny Alegre Savella, Goodger: Office # [phone]    Follow Up:  AS CHF AFib CHF    Subjective/Observations: Patient seen and examined. Patient awake, alert, resting comfortably in bed. No complaints of chest pain, dyspnea, palpitations or dizziness.  Tolerating O2 via nasal cannula.     REVIEW OF SYSTEMS: All review of systems is negative for eye, ENT, GI, , allergic, dermatologic, musculoskeletal and neurologic except as described above    PAST MEDICAL & SURGICAL HISTORY:  Chronic atrial fibrillation    CHF, chronic    Hypertension    Osteoarthritis    Dementia    GERD (gastroesophageal reflux disease)    Iron deficiency anemia    History of cholecystectomy        MEDICATIONS  (STANDING):  bisacodyl Suppository 10 milliGRAM(s) Rectal daily  digoxin     Tablet 125 MICROGram(s) Oral every other day  diltiazem    milliGRAM(s) Oral daily  ergocalciferol 77558 Unit(s) Oral <User Schedule>  ferrous    sulfate 325 milliGRAM(s) Oral daily  furosemide    Tablet 40 milliGRAM(s) Oral daily  influenza  Vaccine (HIGH DOSE) 0.7 milliLiter(s) IntraMuscular once  memantine 10 milliGRAM(s) Oral two times a day  metoprolol tartrate 50 milliGRAM(s) Oral every 8 hours  pantoprazole    Tablet 20 milliGRAM(s) Oral daily  rivaroxaban 15 milliGRAM(s) Oral daily  senna 2 Tablet(s) Oral at bedtime  zolpidem 10 milliGRAM(s) Oral at bedtime    MEDICATIONS  (PRN):  acetaminophen     Tablet .. 650 milliGRAM(s) Oral every 6 hours PRN Temp greater or equal to 38C (100.4F), Mild Pain (1 - 3)    Allergies    No Known Allergies    Intolerances      Vital Signs Last 24 Hrs  T(C): 36.6 (22 Nov 2021 05:08), Max: 36.8 (21 Nov 2021 19:17)  T(F): 97.9 (22 Nov 2021 05:08), Max: 98.3 (21 Nov 2021 19:17)  HR: 110 (22 Nov 2021 05:08) (84 - 110)  BP: 102/72 (22 Nov 2021 05:08) (90/62 - 104/70)  BP(mean): --  RR: 19 (22 Nov 2021 05:08) (19 - 19)  SpO2: 92% (22 Nov 2021 05:08) (90% - 94%)  I&O's Summary    21 Nov 2021 07:01  -  22 Nov 2021 07:00  --------------------------------------------------------  IN: 240 mL / OUT: 0 mL / NET: 240 mL          TELE: [facility]   PHYSICAL EXAM:  Appearance: NAD, no distress, alert, frail   HEENT: Moist Mucous Membranes, Anicteric  Cardiovascular: Irregular rate and rhythm, Normal S1 S2, No JVD, No murmurs, No rubs, gallops or clicks  Respiratory: Non-labored, Clear to auscultation, No rales, No rhonchi, No wheezing.   Gastrointestinal:  Soft, Non-tender, + BS  Skin: Warm and dry, No visible rashes or ulcers, No ecchymosis, No cyanosis  Musculoskeletal: No clubbing, No cyanosis, No joint swelling/tenderness  Psychiatry: Mood & affect appropriate  Lymph: No peripheral edema.     LABS: All Labs Reviewed:                        12.8   5.17  )-----------( 219      ( 22 Nov 2021 08:14 )             40.8                         12.7   6.35  )-----------( 201      ( 21 Nov 2021 08:17 )             40.8                         12.8   5.71  )-----------( 221      ( 20 Nov 2021 08:10 )             40.7     22 Nov 2021 08:14    147    |  113    |  27     ----------------------------<  113    3.9     |  30     |  1.00   21 Nov 2021 08:17    144    |  110    |  33     ----------------------------<  112    4.1     |  28     |  0.99   20 Nov 2021 08:10    144    |  109    |  33     ----------------------------<  107    3.9     |  29     |  1.10     Ca    8.8        22 Nov 2021 08:14  Ca    9.0        21 Nov 2021 08:17  Ca    8.8        20 Nov 2021 08:10  Phos  3.1       22 Nov 2021 08:14  Phos  3.6       21 Nov 2021 08:17  Phos  3.7       20 Nov 2021 08:10  Mg     2.3       22 Nov 2021 08:14  Mg     2.6       21 Nov 2021 08:17  Mg     2.4       20 Nov 2021 08:10        Troponin I, High Sensitivity Result: 20.2 ng/L (11-17-21 @ 11:31)      12 Lead ECG:   Ventricular Rate 86 BPM  Atrial Rate 78 BPM  QRS Duration 112 ms  Q-T Interval 448 ms  QTC Calculation(Bazett) 536 ms  R Axis -32 degrees  T Axis 0 degrees  Diagnosis Line Atrial fibrillation  Left axis deviation  Incomplete left bundle branch block  Nonspecific T wave abnormality  Abnormal ECG  When compared with ECG of 17-NOV-2021 13:00,  Vent. rate has decreased BY  43 BPM  Incomplete left bundle branch block is now present  T wave inversion no longer evident in Anterolateral leads  Confirmed by Reinier Gray MD ([id number]) on 11/18/2021 12:36:00 PM (11-17-21 @ 22:55)    < from: TTE Echo Complete w/o Contrast w/ Doppler (11.18.21 @ 10:09) >  EXAM:  ECHO TTE WO CON COMP W DOPP      PROCEDURE DATE:  11/18/2021    INTERPRETATION:  INDICATION: Heart failure  Sonographer PH  Blood Pressure 101/66    Height 147.3 cm     Weight 50 kg       BSA 1.4 sq m  Dimensions:  LA 5.2       Normal Values: 2.0 - 4.0 cm  Ao 2.5        Normal Values: 2.0 - 3.8 cm  SEPTUM 1.2       Normal Values: 0.6 - 1.2 cm  PWT 1.2       Normal Values: 0.6 - 1.1 cm  LVIDd 4.0         Normal Values: 3.0 - 5.6 cm  LVIDs 2.8         Normal Values: 1.8 - 4.0 cm      OBSERVATIONS:  Mitral Valve: Mitral annular calcification with thickened leaflets. Mitral valve area equals 1.59 sq cm which is consistent with mild mitral stenosis. Mild MR.  Aortic Valve/Aorta: Calcified trileaflet aortic valve with decreasedopening. Peak transaortic valve gradient is 53 mmHg with a mean transaortic valve gradient of 32 mmHg. The aortic valve area is calculated to be 0.4 sq cm by continuity equation. This is consistent with moderate to severe aortic stenosis.  Tricuspid Valve: Mild TR.  Pulmonic Valve: Not well-visualized  Left Atrium: normal  Right Atrium: Not well-visualized  Left Ventricle: normal LV size and systolic function, estimated LVEF of 50-55%.  Right Ventricle: Grossly normal size and systolic function.  Pericardium: no significant pericardial effusion.  Pulmonary/RV Pressure: estimated PA systolic pressure of 32 mmHg        IMPRESSION:  Normal left ventricular internal dimensions and systolic function, estimated LVEF of 50-55%.  Grossly normal RV size and systolic function.  Calcified trileaflet aortic valve with moderate to severe aortic stenosis, without AI.  Mild mitral stenosis with mild regurgitation  Mild tricuspid regurgitation  No significant pericardial effusion.  --- End of Report ---    < end of copied text >    < from: Xray Chest 1 View AP/PA (11.17.21 @ 11:59) >  EXAM:  XR CHEST AP OR PA 1V                        PROCEDURE DATE:  11/17/2021    INTERPRETATION:  Portable chest radiograph  CLINICAL INFORMATION: Dyspnea, shortness of breath.  TECHNIQUE:  Portable  AP view of the chest.  COMPARISON: No previous examinations are available for review.  FINDINGS:  Ill-defined LEFT diaphragmatic contour concerning for LEFT basilar airspace disease and/or effusion. Remaining of visualized lung parenchyma grossly clear.   No pneumothorax.  The  heart is enlarged in transverse diameter. No hilar mass.  Visualized osseous structures are intact.  IMPRESSION: Cardiomegaly  Suspect LEFT basilar airspace disease and/or effusion obscuring LEFT diaphragm contour..  --- End of Report ---  < end of copied text >

## 2021-11-22 NOTE — DISCHARGE NOTE NURSING/CASE MANAGEMENT/SOCIAL WORK - NSDCVIVACCINE_GEN_ALL_CORE_FT
influenza, high-dose, quadrivalent; 22-Nov-2021 12:39; Araceli Bernal (YUMIKO); [employer]; [id number] (Exp. Date: 30-Jun-2022); IntraMuscular; Deltoid Left.; 0.7 milliLiter(s); VIS (VIS Published: 06-Aug-2021, VIS Presented: 22-Nov-2021);

## 2021-11-22 NOTE — PROGRESS NOTE ADULT - SUBJECTIVE AND OBJECTIVE BOX
Patient is a 89y old  Female who presents with a chief complaint of CHF exacerbation (22 Nov 2021 10:04)    HPI:  Patient is a 89 year old female with PMH afib on Xarelto, CHF, HTN, GERD, osteoarthritis, anxiety, iron deficiency anemia who presents to the ER with complaint of SOB. She has been feeling short of breath for a few months now, but her symptoms have worsened over the past week. She has been having shortness of breath with minimal exertion, and some orthopnea, but is able to tolerate lying flat. She denies any cough, congestion, fever, chills, chest pain, abd pain, diarrhea, nausea, or LE pain. She reports being chronically constipated, and is on multiple medications to help her have BMs. Since her symptoms were exacerbated, she has been noticing palpitations. She has been hospitalized for atrial fibrillation before in 2019 in [place]. She has no further complaints or concerns at this time.     In the ED:   VS: T:99, HR: 127, BP: 128/78, RR;18, SpO2: 96% on room air   Labs were significant for Cl: 112, BUN: 25, alk phos: 147, proBNP: 4782  CXR: on personal read, pulmonary vascular congestion noted   EKG: , AFib with RVR, LAD, nonspecific ST and T wave abnormality  Patient received Lasix 40 mg IVP x 1 (17 Nov 2021 13:14)    INTERVAL HPI:      OVERNIGHT EVENTS:    Home Medications:  Ferrex 150 Plus oral capsule: 1 cap(s) orally once a day (17 Nov 2021 13:16)  memantine 28 mg oral capsule, extended release: 1 cap(s) orally once a day (17 Nov 2021 13:16)  pantoprazole 20 mg oral delayed release tablet: 1 tab(s) orally once a day (17 Nov 2021 13:16)  rivaroxaban 15 mg oral tablet: 1 tab(s) orally once a day (22 Nov 2021 11:49)  Vitamin D3 1250 mcg (50,000 intl units) oral capsule: 1 cap(s) orally once a week on Monday  (17 Nov 2021 13:16)  zolpidem 10 mg oral tablet: 1 tab(s) orally once a day (at bedtime), As Needed (17 Nov 2021 13:16)      MEDICATIONS  (STANDING):  bisacodyl Suppository 10 milliGRAM(s) Rectal daily  digoxin     Tablet 125 MICROGram(s) Oral every other day  diltiazem    milliGRAM(s) Oral daily  ergocalciferol 55138 Unit(s) Oral <User Schedule>  ferrous    sulfate 325 milliGRAM(s) Oral daily  memantine 10 milliGRAM(s) Oral two times a day  metoprolol tartrate 50 milliGRAM(s) Oral every 8 hours  pantoprazole    Tablet 20 milliGRAM(s) Oral daily  rivaroxaban 15 milliGRAM(s) Oral daily  senna 2 Tablet(s) Oral at bedtime  zolpidem 10 milliGRAM(s) Oral at bedtime    MEDICATIONS  (PRN):  acetaminophen     Tablet .. 650 milliGRAM(s) Oral every 6 hours PRN Temp greater or equal to 38C (100.4F), Mild Pain (1 - 3)      Allergies    No Known Allergies    Intolerances        Social History:  Pt lives alone at home, receives help from an aide a few times a week for a few hours at a time to help with IADLs.  Denies EtOH, tobacco, and recreational drug use.   Uses walker to ambulate at home. (17 Nov 2021 13:14)      REVIEW OF SYSTEMS:  CONSTITUTIONAL: No fever, No chills, No fatigue, No myalgia, No Body ache, No Weakness  EYES: No eye pain,  No visual disturbances, No discharge, NO Redness  ENMT:  No ear pain, No nose bleed, No vertigo; No sinus pain, NO throat pain, No Congestion  NECK: No pain, No stiffness  RESPIRATORY: No cough, NO wheezing, No  hemoptysis, NO  shortness of breath  CARDIOVASCULAR: No chest pain, palpitations  GASTROINTESTINAL: No abdominal pain, NO epigastric pain. No nausea, No vomiting; No diarrhea, No constipation. [  ] BM  GENITOURINARY: No dysuria, No frequency, No urgency, No hematuria, NO incontinence  NEUROLOGICAL: No headaches, No dizziness, No numbness, No tingling, No tremors, No weakness  EXT: No Swelling, No Pain, No Edema  SKIN:  [  ] No itching, burning, rashes, or lesions   MUSCULOSKELETAL: No joint pain ,No Jt swelling; No muscle pain, No back pain, No extremity pain  PSYCHIATRIC: No depression,  No anxiety,  No mood swings ,No difficulty sleeping at night  PAIN SCALE: [  ] None  [  ] Other-  ROS Unable to obtain due to - [  ] Dementia  [  ] Lethargy [  ] Drowsy [  ] Sedated [  ] non verbal  REST OF REVIEW Of SYSTEM - [  ] Normal     Vital Signs Last 24 Hrs  T(C): 36.7 (22 Nov 2021 12:01), Max: 36.8 (21 Nov 2021 19:17)  T(F): 98 (22 Nov 2021 12:01), Max: 98.3 (21 Nov 2021 19:17)  HR: 66 (22 Nov 2021 12:01) (66 - 110)  BP: 100/62 (22 Nov 2021 12:01) (90/62 - 104/70)  BP(mean): --  RR: 18 (22 Nov 2021 12:01) (18 - 19)  SpO2: 92% (22 Nov 2021 12:01) (90% - 94%)  Finger Stick        11-21 @ 07:01  -  11-22 @ 07:00  --------------------------------------------------------  IN: 240 mL / OUT: 0 mL / NET: 240 mL        PHYSICAL EXAM:  GENERAL:  [  ] NAD , [  ] well appearing, [  ] Agitated, [  ] Drowsy,  [  ] Lethargy, [  ] confused   HEAD:  [  ] Normal, [  ] Other  EYES:  [  ] EOMI, [  ] PERRLA, [  ] conjunctiva and sclera clear normal, [  ] Other,  [  ] Pallor,[  ] Discharge  ENMT:  [  ] Normal, [  ] Moist mucous membranes, [  ] Good dentition, [  ] No Thrush  NECK:  [  ] Supple, [  ] No JVD, [  ] Normal thyroid, [  ] Lymphadenopathy [  ] Other  CHEST/LUNG:  [  ] Clear to auscultation bilaterally, [  ] Breath Sounds equal B/L / Decrease, [  ] poor effort  [  ] No rales, [  ] No rhonchi  [  ]  No wheezing,   HEART:  [  ] Regular rate and rhythm, [  ] tachycardia, [  ] Bradycardia,  [  ] irregular  [  ] No murmurs, No rubs, No gallops, [  ] PPM in place (Mfr:  )  ABDOMEN:  [  ] Soft, [  ] Nontender, [  ] Nondistended, [  ] No mass, [  ] Bowel sounds present, [  ] obese  NERVOUS SYSTEM:  [  ] Alert & Oriented X3, [  ] Nonfocal  [  ] Confusion  [  ] Encephalopathic [  ] Sedated [  ] Unable to assess, [  ] Dementia [  ] Other-  EXTREMITIES: [  ] 2+ Peripheral Pulses, No clubbing, No cyanosis,  [  ] edema B/L lower EXT. [  ] PVD stasis skin changes B/L Lower EXT, [  ] wound  LYMPH: No lymphadenopathy noted  SKIN:  [  ] No rashes or lesions, [  ] Pressure Ulcers, [  ] ecchymosis, [  ] Skin Tears, [  ] Other    DIET: Diet, DASH/TLC:   Sodium & Cholesterol Restricted  1000mL Fluid Restriction ([id number]) (11-17-21 @ 16:02)      LABS:                        12.8   5.17  )-----------( 219      ( 22 Nov 2021 08:14 )             40.8     22 Nov 2021 08:14    147    |  113    |  27     ----------------------------<  113    3.9     |  30     |  1.00     Ca    8.8        22 Nov 2021 08:14  Phos  3.1       22 Nov 2021 08:14  Mg     2.3       22 Nov 2021 08:14                    CARDIAC MARKERS ( 17 Nov 2021 11:31 )  x     / x     / x     / x     / 1.6 ng/mL             Anemia Panel:      Thyroid Panel:  Thyroid Stimulating Hormone, Serum: 1.78 uIU/mL (11-20-21 @ 08:10)            Serum Pro-Brain Natriuretic Peptide: 4782 pg/mL (11-17-21 @ 11:31)      RADIOLOGY & ADDITIONAL TESTS:      HEALTH ISSUES - PROBLEM Dx:  Acute exacerbation of CHF (congestive heart failure)    Atrial fibrillation    Hypertension    GERD (gastroesophageal reflux disease)    Dementia    Iron deficiency anemia    Osteoarthritis    Need for prophylactic measure            Consultant(s) Notes Reviewed:  [  ] YES     Care Discussed with [X] Consultants  [  ] Patient  [  ] Family [  ] HCP [  ]   [  ] Social Service  [  ] RN, [  ] Physical Therapy,[  ] Palliative care team  DVT PPX: [  ] Lovenox, [  ] S C Heparin, [  ] Coumadin, [  ] Xarelto, [  ] Eliquis, [  ] Pradaxa, [  ] IV Heparin drip, [  ] SCD [  ] Contraindication 2 to GI Bleed,[  ] Ambulation [  ] Contraindicated 2 to  bleed [  ] Contraindicated 2 to Brain Bleed  Advanced directive: [  ] None, [  ] DNR/DNI Patient is a 89y old  Female who presents with a chief complaint of CHF exacerbation (22 Nov 2021 10:04)    HPI:  Patient is a 89 year old female with PMH afib on Xarelto, CHF, HTN, GERD, osteoarthritis, anxiety, iron deficiency anemia who presents to the ER with complaint of SOB. She has been feeling short of breath for a few months now, but her symptoms have worsened over the past week. She has been having shortness of breath with minimal exertion, and some orthopnea, but is able to tolerate lying flat. She denies any cough, congestion, fever, chills, chest pain, abd pain, diarrhea, nausea, or LE pain. She reports being chronically constipated, and is on multiple medications to help her have BMs. Since her symptoms were exacerbated, she has been noticing palpitations. She has been hospitalized for atrial fibrillation before in 2019 in [place]. She has no further complaints or concerns at this time.     In the ED:   VS: T:99, HR: 127, BP: 128/78, RR;18, SpO2: 96% on room air   Labs were significant for Cl: 112, BUN: 25, alk phos: 147, proBNP: 4782  CXR: on personal read, pulmonary vascular congestion noted   EKG: , AFib with RVR, LAD, nonspecific ST and T wave abnormality  Patient received Lasix 40 mg IVP x 1 (17 Nov 2021 13:14)    INTERVAL HPI: Patient seen and examined at bedside. Patient had no acute overnight events. Patient       OVERNIGHT EVENTS:    Home Medications:  Ferrex 150 Plus oral capsule: 1 cap(s) orally once a day (17 Nov 2021 13:16)  memantine 28 mg oral capsule, extended release: 1 cap(s) orally once a day (17 Nov 2021 13:16)  pantoprazole 20 mg oral delayed release tablet: 1 tab(s) orally once a day (17 Nov 2021 13:16)  rivaroxaban 15 mg oral tablet: 1 tab(s) orally once a day (22 Nov 2021 11:49)  Vitamin D3 1250 mcg (50,000 intl units) oral capsule: 1 cap(s) orally once a week on Monday  (17 Nov 2021 13:16)  zolpidem 10 mg oral tablet: 1 tab(s) orally once a day (at bedtime), As Needed (17 Nov 2021 13:16)      MEDICATIONS  (STANDING):  bisacodyl Suppository 10 milliGRAM(s) Rectal daily  digoxin     Tablet 125 MICROGram(s) Oral every other day  diltiazem    milliGRAM(s) Oral daily  ergocalciferol 61068 Unit(s) Oral <User Schedule>  ferrous    sulfate 325 milliGRAM(s) Oral daily  memantine 10 milliGRAM(s) Oral two times a day  metoprolol tartrate 50 milliGRAM(s) Oral every 8 hours  pantoprazole    Tablet 20 milliGRAM(s) Oral daily  rivaroxaban 15 milliGRAM(s) Oral daily  senna 2 Tablet(s) Oral at bedtime  zolpidem 10 milliGRAM(s) Oral at bedtime    MEDICATIONS  (PRN):  acetaminophen     Tablet .. 650 milliGRAM(s) Oral every 6 hours PRN Temp greater or equal to 38C (100.4F), Mild Pain (1 - 3)      Allergies    No Known Allergies    Intolerances        Social History:  Pt lives alone at home, receives help from an aide a few times a week for a few hours at a time to help with IADLs.  Denies EtOH, tobacco, and recreational drug use.   Uses walker to ambulate at home. (17 Nov 2021 13:14)      REVIEW OF SYSTEMS:  CONSTITUTIONAL: No fever, No chills, No fatigue, No myalgia, No Body ache, No Weakness  EYES: No eye pain,  No visual disturbances, No discharge, NO Redness  ENMT:  No ear pain, No nose bleed, No vertigo; No sinus pain, NO throat pain, No Congestion  NECK: No pain, No stiffness  RESPIRATORY: No cough, NO wheezing, No  hemoptysis, NO  shortness of breath  CARDIOVASCULAR: No chest pain, palpitations  GASTROINTESTINAL: No abdominal pain, NO epigastric pain. No nausea, No vomiting; No diarrhea, No constipation. [  ] BM  GENITOURINARY: No dysuria, No frequency, No urgency, No hematuria, NO incontinence  NEUROLOGICAL: No headaches, No dizziness, No numbness, No tingling, No tremors, No weakness  EXT: No Swelling, No Pain, No Edema  SKIN:  [  ] No itching, burning, rashes, or lesions   MUSCULOSKELETAL: No joint pain ,No Jt swelling; No muscle pain, No back pain, No extremity pain  PSYCHIATRIC: No depression,  No anxiety,  No mood swings ,No difficulty sleeping at night  PAIN SCALE: [  ] None  [  ] Other-  ROS Unable to obtain due to - [  ] Dementia  [  ] Lethargy [  ] Drowsy [  ] Sedated [  ] non verbal  REST OF REVIEW Of SYSTEM - [  ] Normal     Vital Signs Last 24 Hrs  T(C): 36.7 (22 Nov 2021 12:01), Max: 36.8 (21 Nov 2021 19:17)  T(F): 98 (22 Nov 2021 12:01), Max: 98.3 (21 Nov 2021 19:17)  HR: 66 (22 Nov 2021 12:01) (66 - 110)  BP: 100/62 (22 Nov 2021 12:01) (90/62 - 104/70)  BP(mean): --  RR: 18 (22 Nov 2021 12:01) (18 - 19)  SpO2: 92% (22 Nov 2021 12:01) (90% - 94%)  Finger Stick        11-21 @ 07:01  -  11-22 @ 07:00  --------------------------------------------------------  IN: 240 mL / OUT: 0 mL / NET: 240 mL        PHYSICAL EXAM:  GENERAL:  [  ] NAD , [  ] well appearing, [  ] Agitated, [  ] Drowsy,  [  ] Lethargy, [  ] confused   HEAD:  [  ] Normal, [  ] Other  EYES:  [  ] EOMI, [  ] PERRLA, [  ] conjunctiva and sclera clear normal, [  ] Other,  [  ] Pallor,[  ] Discharge  ENMT:  [  ] Normal, [  ] Moist mucous membranes, [  ] Good dentition, [  ] No Thrush  NECK:  [  ] Supple, [  ] No JVD, [  ] Normal thyroid, [  ] Lymphadenopathy [  ] Other  CHEST/LUNG:  [  ] Clear to auscultation bilaterally, [  ] Breath Sounds equal B/L / Decrease, [  ] poor effort  [  ] No rales, [  ] No rhonchi  [  ]  No wheezing,   HEART:  [  ] Regular rate and rhythm, [  ] tachycardia, [  ] Bradycardia,  [  ] irregular  [  ] No murmurs, No rubs, No gallops, [  ] PPM in place (Mfr:  )  ABDOMEN:  [  ] Soft, [  ] Nontender, [  ] Nondistended, [  ] No mass, [  ] Bowel sounds present, [  ] obese  NERVOUS SYSTEM:  [  ] Alert & Oriented X3, [  ] Nonfocal  [  ] Confusion  [  ] Encephalopathic [  ] Sedated [  ] Unable to assess, [  ] Dementia [  ] Other-  EXTREMITIES: [  ] 2+ Peripheral Pulses, No clubbing, No cyanosis,  [  ] edema B/L lower EXT. [  ] PVD stasis skin changes B/L Lower EXT, [  ] wound  LYMPH: No lymphadenopathy noted  SKIN:  [  ] No rashes or lesions, [  ] Pressure Ulcers, [  ] ecchymosis, [  ] Skin Tears, [  ] Other    DIET: Diet, DASH/TLC:   Sodium & Cholesterol Restricted  1000mL Fluid Restriction ([id number]) (11-17-21 @ 16:02)      LABS:                        12.8   5.17  )-----------( 219      ( 22 Nov 2021 08:14 )             40.8     22 Nov 2021 08:14    147    |  113    |  27     ----------------------------<  113    3.9     |  30     |  1.00     Ca    8.8        22 Nov 2021 08:14  Phos  3.1       22 Nov 2021 08:14  Mg     2.3       22 Nov 2021 08:14                    CARDIAC MARKERS ( 17 Nov 2021 11:31 )  x     / x     / x     / x     / 1.6 ng/mL             Anemia Panel:      Thyroid Panel:  Thyroid Stimulating Hormone, Serum: 1.78 uIU/mL (11-20-21 @ 08:10)            Serum Pro-Brain Natriuretic Peptide: 4782 pg/mL (11-17-21 @ 11:31)      RADIOLOGY & ADDITIONAL TESTS:      HEALTH ISSUES - PROBLEM Dx:  Acute exacerbation of CHF (congestive heart failure)    Atrial fibrillation    Hypertension    GERD (gastroesophageal reflux disease)    Dementia    Iron deficiency anemia    Osteoarthritis    Need for prophylactic measure            Consultant(s) Notes Reviewed:  [  ] YES     Care Discussed with [X] Consultants  [  ] Patient  [  ] Family [  ] HCP [  ]   [  ] Social Service  [  ] RN, [  ] Physical Therapy,[  ] Palliative care team  DVT PPX: [  ] Lovenox, [  ] S C Heparin, [  ] Coumadin, [  ] Xarelto, [  ] Eliquis, [  ] Pradaxa, [  ] IV Heparin drip, [  ] SCD [  ] Contraindication 2 to GI Bleed,[  ] Ambulation [  ] Contraindicated 2 to  bleed [  ] Contraindicated 2 to Brain Bleed  Advanced directive: [  ] None, [  ] DNR/DNI Patient is a 89y old  Female who presents with a chief complaint of CHF exacerbation (22 Nov 2021 10:04)    HPI:  Patient is a 89 year old female with PMH afib on Xarelto, CHF, HTN, GERD, osteoarthritis, anxiety, iron deficiency anemia who presents to the ER with complaint of SOB. She has been feeling short of breath for a few months now, but her symptoms have worsened over the past week. She has been having shortness of breath with minimal exertion, and some orthopnea, but is able to tolerate lying flat. She denies any cough, congestion, fever, chills, chest pain, abd pain, diarrhea, nausea, or LE pain. She reports being chronically constipated, and is on multiple medications to help her have BMs. Since her symptoms were exacerbated, she has been noticing palpitations. She has been hospitalized for atrial fibrillation before in 2019 in [place]. She has no further complaints or concerns at this time.     In the ED:   VS: T:99, HR: 127, BP: 128/78, RR;18, SpO2: 96% on room air   Labs were significant for Cl: 112, BUN: 25, alk phos: 147, proBNP: 4782  CXR: on personal read, pulmonary vascular congestion noted   EKG: , AFib with RVR, LAD, nonspecific ST and T wave abnormality  Patient received Lasix 40 mg IVP x 1 (17 Nov 2021 13:14)    INTERVAL HPI: Patient seen and examined at bedside. Patient had no acute overnight events. Patient has no acute complaints at bedside. patient denies headache, dizziness, chest pain, sob, n/v/d/c.      OVERNIGHT EVENTS:    Home Medications:  Ferrex 150 Plus oral capsule: 1 cap(s) orally once a day (17 Nov 2021 13:16)  memantine 28 mg oral capsule, extended release: 1 cap(s) orally once a day (17 Nov 2021 13:16)  pantoprazole 20 mg oral delayed release tablet: 1 tab(s) orally once a day (17 Nov 2021 13:16)  rivaroxaban 15 mg oral tablet: 1 tab(s) orally once a day (22 Nov 2021 11:49)  Vitamin D3 1250 mcg (50,000 intl units) oral capsule: 1 cap(s) orally once a week on Monday  (17 Nov 2021 13:16)  zolpidem 10 mg oral tablet: 1 tab(s) orally once a day (at bedtime), As Needed (17 Nov 2021 13:16)      MEDICATIONS  (STANDING):  bisacodyl Suppository 10 milliGRAM(s) Rectal daily  digoxin     Tablet 125 MICROGram(s) Oral every other day  diltiazem    milliGRAM(s) Oral daily  ergocalciferol 80280 Unit(s) Oral <User Schedule>  ferrous    sulfate 325 milliGRAM(s) Oral daily  memantine 10 milliGRAM(s) Oral two times a day  metoprolol tartrate 50 milliGRAM(s) Oral every 8 hours  pantoprazole    Tablet 20 milliGRAM(s) Oral daily  rivaroxaban 15 milliGRAM(s) Oral daily  senna 2 Tablet(s) Oral at bedtime  zolpidem 10 milliGRAM(s) Oral at bedtime    MEDICATIONS  (PRN):  acetaminophen     Tablet .. 650 milliGRAM(s) Oral every 6 hours PRN Temp greater or equal to 38C (100.4F), Mild Pain (1 - 3)      Allergies    No Known Allergies    Intolerances        Social History:  Pt lives alone at home, receives help from an aide a few times a week for a few hours at a time to help with IADLs.  Denies EtOH, tobacco, and recreational drug use.   Uses walker to ambulate at home. (17 Nov 2021 13:14)      REVIEW OF SYSTEMS:  CONSTITUTIONAL: No fever, No chills, No fatigue, No myalgia, No Body ache, No Weakness  EYES: No eye pain,  No visual disturbances, No discharge, NO Redness  ENMT:  No ear pain, No nose bleed, No vertigo; No sinus pain, NO throat pain, No Congestion  NECK: No pain, No stiffness  RESPIRATORY: No cough, NO wheezing, No  hemoptysis, NO  shortness of breath  CARDIOVASCULAR: No chest pain, palpitations  GASTROINTESTINAL: No abdominal pain, NO epigastric pain. No nausea, No vomiting; No diarrhea, No constipation. [  ] BM  GENITOURINARY: No dysuria, No frequency, No urgency, No hematuria, NO incontinence  NEUROLOGICAL: No headaches, No dizziness, No numbness, No tingling, No tremors, No weakness  EXT: No Swelling, No Pain, No Edema  SKIN:  [ x ] No itching, burning, rashes, or lesions   MUSCULOSKELETAL: No joint pain ,No Jt swelling; No muscle pain, No back pain, No extremity pain  PSYCHIATRIC: No depression,  No anxiety,  No mood swings ,No difficulty sleeping at night  PAIN SCALE: [ x ] None  [  ] Other-  ROS Unable to obtain due to - [  ] Dementia  [  ] Lethargy [  ] Drowsy [  ] Sedated [  ] non verbal  REST OF REVIEW Of SYSTEM - [  ] Normal     Vital Signs Last 24 Hrs  T(C): 36.7 (22 Nov 2021 12:01), Max: 36.8 (21 Nov 2021 19:17)  T(F): 98 (22 Nov 2021 12:01), Max: 98.3 (21 Nov 2021 19:17)  HR: 66 (22 Nov 2021 12:01) (66 - 110)  BP: 100/62 (22 Nov 2021 12:01) (90/62 - 104/70)  BP(mean): --  RR: 18 (22 Nov 2021 12:01) (18 - 19)  SpO2: 92% (22 Nov 2021 12:01) (90% - 94%)  Finger Stick        11-21 @ 07:01  -  11-22 @ 07:00  --------------------------------------------------------  IN: 240 mL / OUT: 0 mL / NET: 240 mL        PHYSICAL EXAM:  GENERAL:  [ x ] NAD , [x  ] well appearing, [  ] Agitated, [  ] Drowsy,  [  ] Lethargy, [  ] confused   HEAD:  [x  ] Normal, [  ] Other  EYES:  [  x] EOMI, [  x] PERRLA, [ x ] conjunctiva and sclera clear normal, [  ] Other,  [  ] Pallor,[  ] Discharge  ENMT:  [  ] Normal, [  ] Moist mucous membranes, [  ] Good dentition, [  ] No Thrush  NECK:  [  ] Supple, [x  ] No JVD, [  ] Normal thyroid, [  ] Lymphadenopathy [  ] Other  CHEST/LUNG:  [ x ] Clear to auscultation bilaterally, [ x ] Breath Sounds equal B/L / Decrease, [  ] poor effort  [ x ] No rales, [ x ] No rhonchi  [ x ]  No wheezing,   HEART:  [ x ] Regular rate and rhythm, [  ] tachycardia, [  ] Bradycardia,  [  ] irregular  [ x ] No murmurs, No rubs, No gallops, [  ] PPM in place (Mfr:  )  ABDOMEN:  [ x ] Soft, [ x ] Nontender, [ x ] Nondistended, [ x ] No mass, [ x ] Bowel sounds present, [  ] obese  NERVOUS SYSTEM:  [ x ] Alert & Oriented X3, [  ] Nonfocal  [  ] Confusion  [  ] Encephalopathic [  ] Sedated [  ] Unable to assess, [  ] Dementia [  ] Other-  EXTREMITIES: [ x ] 2+ Peripheral Pulses, No clubbing, No cyanosis,  [  ] edema B/L lower EXT. [  ] PVD stasis skin changes B/L Lower EXT, [  ] wound  LYMPH: No lymphadenopathy noted  SKIN:  [ x ] No rashes or lesions, [  ] Pressure Ulcers, [  ] ecchymosis, [  ] Skin Tears, [  ] Other    DIET: Diet, DASH/TLC:   Sodium & Cholesterol Restricted  1000mL Fluid Restriction ([id number]) (11-17-21 @ 16:02)      LABS:                        12.8   5.17  )-----------( 219      ( 22 Nov 2021 08:14 )             40.8     22 Nov 2021 08:14    147    |  113    |  27     ----------------------------<  113    3.9     |  30     |  1.00     Ca    8.8        22 Nov 2021 08:14  Phos  3.1       22 Nov 2021 08:14  Mg     2.3       22 Nov 2021 08:14                    CARDIAC MARKERS ( 17 Nov 2021 11:31 )  x     / x     / x     / x     / 1.6 ng/mL             Anemia Panel:      Thyroid Panel:  Thyroid Stimulating Hormone, Serum: 1.78 uIU/mL (11-20-21 @ 08:10)            Serum Pro-Brain Natriuretic Peptide: 4782 pg/mL (11-17-21 @ 11:31)      RADIOLOGY & ADDITIONAL TESTS:      HEALTH ISSUES - PROBLEM Dx:  Acute exacerbation of CHF (congestive heart failure)    Atrial fibrillation    Hypertension    GERD (gastroesophageal reflux disease)    Dementia    Iron deficiency anemia    Osteoarthritis    Need for prophylactic measure            Consultant(s) Notes Reviewed:  [ x ] YES     Care Discussed with [X] Consultants  [  ] Patient  [  ] Family [  ] HCP [  ]   [  ] Social Service  [  ] RN, [  ] Physical Therapy,[  ] Palliative care team  DVT PPX: [  ] Lovenox, [  ] S C Heparin, [  ] Coumadin, [  ] Xarelto, [  ] Eliquis, [  ] Pradaxa, [  ] IV Heparin drip, [  ] SCD [  ] Contraindication 2 to GI Bleed,[  ] Ambulation [  ] Contraindicated 2 to  bleed [  ] Contraindicated 2 to Brain Bleed  Advanced directive: [  ] None, [  ] DNR/DNI Patient is a 89y old  Female who presents with a chief complaint of CHF exacerbation (22 Nov 2021 10:04)    HPI:  Patient is a 89 year old female with PMH afib on Xarelto, CHF, HTN, GERD, osteoarthritis, anxiety, iron deficiency anemia who presents to the ER with complaint of SOB. She has been feeling short of breath for a few months now, but her symptoms have worsened over the past week. She has been having shortness of breath with minimal exertion, and some orthopnea, but is able to tolerate lying flat. She denies any cough, congestion, fever, chills, chest pain, abd pain, diarrhea, nausea, or LE pain. She reports being chronically constipated, and is on multiple medications to help her have BMs. Since her symptoms were exacerbated, she has been noticing palpitations. She has been hospitalized for atrial fibrillation before in 2019 in [place]. She has no further complaints or concerns at this time.     In the ED:   VS: T:99, HR: 127, BP: 128/78, RR;18, SpO2: 96% on room air   Labs were significant for Cl: 112, BUN: 25, alk phos: 147, proBNP: 4782  CXR: on personal read, pulmonary vascular congestion noted   EKG: , AFib with RVR, LAD, nonspecific ST and T wave abnormality  Patient received Lasix 40 mg IVP x 1 (17 Nov 2021 13:14)    INTERVAL HPI:  (11/18/21)- Pt seen and examined at bedside this AM. She reports improvement in her difficulty breathing and her palpitations. She appears more comfortable today as well. She has no pain, and otherwise has no complaints or concerns. On IV Lasix , Pt has Home O2 uses PRN,  (11/19/21)- Pt seen and examined at bedside this morning. States she feels well, denies any SOB or difficulty breathing, appears comfortable in bed. Palliative evaluated patient yesterday and she is DNR/DNI. Remains on IV Lasix, mild increase in Cr noted. Will hold PM dose today. Remains in afib with rates in the 100s. Started on digoxin qod as per cardio recommendations.   (11/20/21) Pt seen and examined at the bedside this AM. She denies any shortness of breath, cough, chest pain or palpitations. She has no questions or concerns at this time, and appears comfortable lying in bed. On PO Lasix.  (11/21/21) Pt seen and examined at the bedside. She feels well this morning, and does not have any chest pain, palpitations, fevers, or shortness of breath. She is wondering when she will be able to go home. Pt also denies any GI complaints.   11/22/21- Patient seen and examined at bedside. Patient had no acute overnight events. Patient has no acute complaints at bedside, patient denies headache, dizziness, chest pain, sob, n/v/d/c. D/C Home Today as d/w Dr Alegre,       OVERNIGHT EVENTS: NONE    Home Medications:  Ferrex 150 Plus oral capsule: 1 cap(s) orally once a day (17 Nov 2021 13:16)  memantine 28 mg oral capsule, extended release: 1 cap(s) orally once a day (17 Nov 2021 13:16)  pantoprazole 20 mg oral delayed release tablet: 1 tab(s) orally once a day (17 Nov 2021 13:16)  rivaroxaban 15 mg oral tablet: 1 tab(s) orally once a day (22 Nov 2021 11:49)  Vitamin D3 1250 mcg (50,000 intl units) oral capsule: 1 cap(s) orally once a week on Monday  (17 Nov 2021 13:16)  zolpidem 10 mg oral tablet: 1 tab(s) orally once a day (at bedtime), As Needed (17 Nov 2021 13:16)      MEDICATIONS  (STANDING):  bisacodyl Suppository 10 milliGRAM(s) Rectal daily  digoxin     Tablet 125 MICROGram(s) Oral every other day  diltiazem    milliGRAM(s) Oral daily  ergocalciferol 57304 Unit(s) Oral <User Schedule>  ferrous    sulfate 325 milliGRAM(s) Oral daily  memantine 10 milliGRAM(s) Oral two times a day  metoprolol tartrate 50 milliGRAM(s) Oral every 8 hours  pantoprazole    Tablet 20 milliGRAM(s) Oral daily  rivaroxaban 15 milliGRAM(s) Oral daily  senna 2 Tablet(s) Oral at bedtime  zolpidem 10 milliGRAM(s) Oral at bedtime    MEDICATIONS  (PRN):  acetaminophen     Tablet .. 650 milliGRAM(s) Oral every 6 hours PRN Temp greater or equal to 38C (100.4F), Mild Pain (1 - 3)      Allergies    No Known Allergies    Intolerances        Social History:  Pt lives alone at home, receives help from an aide a few times a week for a few hours at a time to help with IADLs.  Denies EtOH, tobacco, and recreational drug use.   Uses walker to ambulate at home. (17 Nov 2021 13:14)      REVIEW OF SYSTEMS:  CONSTITUTIONAL: No fever, No chills, No fatigue, No myalgia, No Body ache, No Weakness  EYES: No eye pain,  No visual disturbances, No discharge, NO Redness  ENMT:  No ear pain, No nose bleed, No vertigo; No sinus pain, NO throat pain, No Congestion  NECK: No pain, No stiffness  RESPIRATORY: No cough, NO wheezing, No  hemoptysis, NO  shortness of breath  CARDIOVASCULAR: No chest pain, palpitations  GASTROINTESTINAL: No abdominal pain, NO epigastric pain. No nausea, No vomiting; No diarrhea, No constipation. [x  ] BM  GENITOURINARY: No dysuria, No frequency, No urgency, No hematuria, NO incontinence  NEUROLOGICAL: No headaches, No dizziness, No numbness, No tingling, No tremors, No weakness  EXT: No Swelling, No Pain, No Edema  SKIN:  [ x ] No itching, burning, rashes, or lesions   MUSCULOSKELETAL: No joint pain ,No Jt swelling; No muscle pain, No back pain, No extremity pain  PSYCHIATRIC: No depression,  No anxiety,  No mood swings ,No difficulty sleeping at night  PAIN SCALE: [ x ] None  [  ] Other-  ROS Unable to obtain due to - [  ] Dementia  [  ] Lethargy [  ] Drowsy [  ] Sedated [  ] non verbal  REST OF REVIEW Of SYSTEM - [  x] Normal     Vital Signs Last 24 Hrs  T(C): 36.7 (22 Nov 2021 12:01), Max: 36.8 (21 Nov 2021 19:17)  T(F): 98 (22 Nov 2021 12:01), Max: 98.3 (21 Nov 2021 19:17)  HR: 66 (22 Nov 2021 12:01) (66 - 110)  BP: 100/62 (22 Nov 2021 12:01) (90/62 - 104/70)  BP(mean): --  RR: 18 (22 Nov 2021 12:01) (18 - 19)  SpO2: 92% (22 Nov 2021 12:01) (90% - 94%)  Finger Stick        11-21 @ 07:01  -  11-22 @ 07:00  --------------------------------------------------------  IN: 240 mL / OUT: 0 mL / NET: 240 mL        PHYSICAL EXAM:  GENERAL:  [ x ] NAD , [x  ] well appearing, [  ] Agitated, [  ] Drowsy,  [  ] Lethargy, [  ] confused   HEAD:  [x  ] Normal, [  ] Other  EYES:  [  x] EOMI, [  x] PERRLA, [ x ] conjunctiva and sclera clear normal, [  ] Other,  [  ] Pallor,[  ] Discharge  ENMT:  [x  ] Normal, [x  ] Moist mucous membranes, [x  ] Good dentition, [ x ] No Thrush  NECK:  [ x ] Supple, [x  ] No JVD, [ x ] Normal thyroid, [  ] Lymphadenopathy [  ] Other  CHEST/LUNG:  [ x ] Clear to auscultation bilaterally, [ x ] Breath Sounds equal B/L / Decrease, [  ] poor effort  [ x ] No rales, [ x ] No rhonchi  [ x ]  No wheezing,   HEART:  [ x ] Regular rate and rhythm, [  ] tachycardia, [  ] Bradycardia,  [  ] irregular  [ x ] 3/6 murmurs, No rubs, No gallops, [  ] PPM in place (Mfr:  )  ABDOMEN:  [ x ] Soft, [ x ] Nontender, [ x ] Nondistended, [ x ] No mass, [ x ] Bowel sounds present, [  ] obese  NERVOUS SYSTEM:  [ x ] Alert & Oriented X3, [ x ] Nonfocal  [  ] Confusion  [  ] Encephalopathic [  ] Sedated [  ] Unable to assess, [  ] Dementia [  ] Other-  EXTREMITIES: [ x ] 2+ Peripheral Pulses, No clubbing, No cyanosis,  [  ] edema B/L lower EXT. [  ] PVD stasis skin changes B/L Lower EXT, [  ] wound  LYMPH: No lymphadenopathy noted  SKIN:  [ x ] No rashes or lesions, [  ] Pressure Ulcers, [  ] ecchymosis, [  ] Skin Tears, [  ] Other    DIET: Diet, DASH/TLC:   Sodium & Cholesterol Restricted  1000mL Fluid Restriction ([id number]) (11-17-21 @ 16:02)      LABS:                        12.8   5.17  )-----------( 219      ( 22 Nov 2021 08:14 )             40.8     22 Nov 2021 08:14    147    |  113    |  27     ----------------------------<  113    3.9     |  30     |  1.00     Ca    8.8        22 Nov 2021 08:14  Phos  3.1       22 Nov 2021 08:14  Mg     2.3       22 Nov 2021 08:14    CARDIAC MARKERS ( 17 Nov 2021 11:31 )  x     / x     / x     / x     / 1.6 ng/mL    Anemia Panel:      Thyroid Panel:  Thyroid Stimulating Hormone, Serum: 1.78 uIU/mL (11-20-21 @ 08:10)    Serum Pro-Brain Natriuretic Peptide: 4782 pg/mL (11-17-21 @ 11:31)      RADIOLOGY & ADDITIONAL TESTS:  < from: Xray Chest 1 View AP/PA (11.21.21 @ 12:02) >    EXAM:  XR CHEST AP OR PA 1V                            PROCEDURE DATE:  11/21/2021          INTERPRETATION:  Chest one view    HISTORY: Shortness of breath    COMPARISON STUDY: 11/17/2021    Frontal expiratory view of the chest shows the heart to be similarly enlarged in size. The lungs show less pulmonary congestion with smaller pleural effusions and there is no evidence of pneumothorax.    IMPRESSION:  Decreased congestive changes.    Thank you for the courtesy of this referral.    --- End ofReport ---          < end of copied text >      HEALTH ISSUES - PROBLEM Dx:  Acute exacerbation of CHF (congestive heart failure)    Atrial fibrillation    Hypertension    GERD (gastroesophageal reflux disease)    Dementia    Iron deficiency anemia    Osteoarthritis    Need for prophylactic measure        Consultant(s) Notes Reviewed:  [ x ] YES     Care Discussed with [X] Consultants  [ x ] Patient  [ x ] Family- Dtr & Dr Granados  [  ] HCP [x  ]   [  ] Social Service  [x  ] RN, [  ] Physical Therapy,[  ] Palliative care team  DVT PPX: [  ] Lovenox, [  ] S C Heparin, [  ] Coumadin, [ x ] Xarelto, [  ] Eliquis, [  ] Pradaxa, [  ] IV Heparin drip, [  ] SCD [  ] Contraindication 2 to GI Bleed,[  ] Ambulation [  ] Contraindicated 2 to  bleed [  ] Contraindicated 2 to Brain Bleed  Advanced directive: [  ] None, [ x ] DNR/DNI

## 2021-11-22 NOTE — DISCHARGE NOTE NURSING/CASE MANAGEMENT/SOCIAL WORK - PATIENT PORTAL LINK FT
You can access the FollowMyHealth Patient Portal offered by [facility] by registering at the following website: [email or web address]. By joining [employer]’s FollowMyHealth portal, you will also be able to view your health information using other applications (apps) compatible with our system.

## 2021-11-22 NOTE — PROGRESS NOTE ADULT - PROBLEM SELECTOR PLAN 6
Chronic, stable  - Hb 12.0 on admission  - Continue home ferrous sulfate History of mild dementia  -Continue memantine 10 mg BID

## 2021-11-22 NOTE — PROGRESS NOTE ADULT - PROBLEM SELECTOR PLAN 5
History of mild dementia  -Continue memantine 10 mg BID Chronic, stable   - Continue pantoprazole 20 mg daily

## 2021-11-22 NOTE — PROGRESS NOTE ADULT - ATTENDING COMMENTS
More euvolemic.  To follow outpatient for TAVR eval.  Continue AC and robin agents for AF.  To follow.

## 2021-11-23 PROBLEM — Z00.00 ENCOUNTER FOR PREVENTIVE HEALTH EXAMINATION: Status: ACTIVE | Noted: 2021-11-23

## 2021-11-23 PROBLEM — K21.9 GASTRO-ESOPHAGEAL REFLUX DISEASE WITHOUT ESOPHAGITIS: Chronic | Status: ACTIVE | Noted: 2021-11-17

## 2021-11-23 PROBLEM — M19.90 UNSPECIFIED OSTEOARTHRITIS, UNSPECIFIED SITE: Chronic | Status: ACTIVE | Noted: 2021-11-17

## 2021-11-23 PROBLEM — D50.9 IRON DEFICIENCY ANEMIA, UNSPECIFIED: Chronic | Status: ACTIVE | Noted: 2021-11-17

## 2021-11-23 PROBLEM — I48.20 CHRONIC ATRIAL FIBRILLATION, UNSPECIFIED: Chronic | Status: ACTIVE | Noted: 2021-11-17

## 2021-11-23 PROBLEM — I50.9 HEART FAILURE, UNSPECIFIED: Chronic | Status: ACTIVE | Noted: 2021-11-17

## 2021-11-23 PROBLEM — F03.90 UNSPECIFIED DEMENTIA WITHOUT BEHAVIORAL DISTURBANCE: Chronic | Status: ACTIVE | Noted: 2021-11-17

## 2021-11-23 PROBLEM — I10 ESSENTIAL (PRIMARY) HYPERTENSION: Chronic | Status: ACTIVE | Noted: 2021-11-17

## 2021-11-24 DIAGNOSIS — I35.9 NONRHEUMATIC AORTIC VALVE DISORDER, UNSPECIFIED: ICD-10-CM

## 2021-12-07 ENCOUNTER — NON-APPOINTMENT (OUTPATIENT)
Age: 86
End: 2021-12-07

## 2021-12-07 ENCOUNTER — APPOINTMENT (OUTPATIENT)
Dept: CARDIOLOGY | Facility: CLINIC | Age: 86
End: 2021-12-07
Payer: MEDICARE

## 2021-12-07 VITALS
HEART RATE: 88 BPM | WEIGHT: 104 LBS | DIASTOLIC BLOOD PRESSURE: 64 MMHG | SYSTOLIC BLOOD PRESSURE: 119 MMHG | OXYGEN SATURATION: 94 %

## 2021-12-07 VITALS — HEIGHT: 63 IN | BODY MASS INDEX: 18.42 KG/M2

## 2021-12-07 DIAGNOSIS — I48.91 UNSPECIFIED ATRIAL FIBRILLATION: ICD-10-CM

## 2021-12-07 PROCEDURE — 93306 TTE W/DOPPLER COMPLETE: CPT

## 2021-12-07 PROCEDURE — 99215 OFFICE O/P EST HI 40 MIN: CPT

## 2021-12-07 PROCEDURE — 93000 ELECTROCARDIOGRAM COMPLETE: CPT

## 2021-12-07 RX ORDER — METOPROLOL TARTRATE 50 MG/1
50 TABLET, FILM COATED ORAL
Refills: 3 | Status: DISCONTINUED | COMMUNITY
End: 2021-12-07

## 2021-12-07 RX ORDER — MEMANTINE HYDROCHLORIDE 28 MG/1
28 CAPSULE, EXTENDED RELEASE ORAL
Refills: 0 | Status: ACTIVE | COMMUNITY

## 2021-12-07 RX ORDER — IRON PS COMPLEX/B12/FOLIC ACID 150-25-1
CAPSULE ORAL
Refills: 0 | Status: ACTIVE | COMMUNITY

## 2021-12-07 RX ORDER — PANTOPRAZOLE 20 MG/1
20 TABLET, DELAYED RELEASE ORAL
Refills: 0 | Status: ACTIVE | COMMUNITY

## 2021-12-07 RX ORDER — POTASSIUM CHLORIDE 10 MEQ
10 CAPSULE, EXTENDED RELEASE ORAL
Refills: 0 | Status: DISCONTINUED | COMMUNITY
End: 2021-12-07

## 2021-12-07 RX ORDER — RIVAROXABAN 15 MG/1
15 TABLET, FILM COATED ORAL
Refills: 0 | Status: ACTIVE | COMMUNITY

## 2021-12-07 RX ORDER — DIGOXIN 125 UG/1
125 TABLET ORAL
Refills: 0 | Status: ACTIVE | COMMUNITY

## 2021-12-07 RX ORDER — DILTIAZEM HYDROCHLORIDE 120 MG/1
120 CAPSULE, EXTENDED RELEASE ORAL
Refills: 0 | Status: ACTIVE | COMMUNITY

## 2021-12-07 NOTE — HISTORY OF PRESENT ILLNESS
[FreeTextEntry1] : Ernestina presented to the office today for a follow-up evaluation.  She presents after being recently hospitalized.\par \par She is now 89 years old, with a history of aortic stenosis.  She has a history of atrial fibrillation, on Xarelto.  She presented to the hospital recently with atrial fibrillation with an accelerated ventricular response, and decompensated heart failure.  Echocardiography suggested at least moderate to severe aortic stenosis.  There were discussions about the possibility of transcatheter aortic valve replacement.  She was rate controlled, diuresed, and now presents to the office today in follow-up.\par \par Over the last couple of weeks, she has been doing well.  She does not report any recurrent symptoms of heart failure.  She has been taking her medications in general as prescribed, though she had been taking metoprolol twice daily, instead of 3 times daily.  She remains compliant with furosemide and potassium.

## 2021-12-07 NOTE — DISCUSSION/SUMMARY
[FreeTextEntry1] : At this point, she seems rate controlled, euvolemic, and is overall feeling well.  It remains unclear whether she has severe or moderate to severe aortic stenosis.  She will have a repeat echocardiogram today which hopefully will clarify things.  We had an extensive conversation regarding the merits of transcatheter aortic valve replacement should she be found to have severe aortic stenosis.  I discussed the various appointments she would need to expect to make, including for consultation and cardiac catheterization as well as CT scanning.  I will be in touch with him to discuss the results of the echocardiogram.  They will have blood work done in about 2 weeks.

## 2021-12-07 NOTE — PHYSICAL EXAM
[Well Developed] : well developed [Well Nourished] : well nourished [No Acute Distress] : no acute distress [Normal Conjunctiva] : normal conjunctiva [Normal Venous Pressure] : normal venous pressure [No Carotid Bruit] : no carotid bruit [Normal S1, S2] : normal S1, S2 [No Rub] : no rub [No Gallop] : no gallop [Normal Rate] : normal [Irregularly Irregular] : irregularly irregular [I] : a grade 1 [II] : a grade 2 [Clear Lung Fields] : clear lung fields [Good Air Entry] : good air entry [No Respiratory Distress] : no respiratory distress  [Soft] : abdomen soft [Non Tender] : non-tender [No Masses/organomegaly] : no masses/organomegaly [Normal Bowel Sounds] : normal bowel sounds [Normal Gait] : normal gait [No Edema] : no edema [No Cyanosis] : no cyanosis [No Clubbing] : no clubbing [No Varicosities] : no varicosities [No Rash] : no rash [No Skin Lesions] : no skin lesions [Moves all extremities] : moves all extremities [No Focal Deficits] : no focal deficits [Normal Speech] : normal speech [Alert and Oriented] : alert and oriented [Normal memory] : normal memory

## 2021-12-21 ENCOUNTER — APPOINTMENT (OUTPATIENT)
Dept: CARDIOTHORACIC SURGERY | Facility: CLINIC | Age: 86
End: 2021-12-21

## 2021-12-21 ENCOUNTER — NON-APPOINTMENT (OUTPATIENT)
Age: 86
End: 2021-12-21

## 2022-03-07 PROBLEM — M19.90 OSTEOARTHRITIS: Status: ACTIVE | Noted: 2022-03-07

## 2022-03-07 PROBLEM — Z86.79: Status: RESOLVED | Noted: 2022-03-07 | Resolved: 2022-03-07

## 2022-03-07 PROBLEM — I50.9 ACUTE ON CHRONIC HEART FAILURE: Status: ACTIVE | Noted: 2022-03-07

## 2022-03-07 RX ORDER — METOPROLOL SUCCINATE 100 MG/1
100 TABLET, EXTENDED RELEASE ORAL
Qty: 60 | Refills: 2 | Status: ACTIVE | COMMUNITY
Start: 2022-03-07

## 2022-03-07 RX ORDER — METOPROLOL SUCCINATE 100 MG/1
100 TABLET, EXTENDED RELEASE ORAL DAILY
Qty: 90 | Refills: 3 | Status: COMPLETED | COMMUNITY
Start: 2021-12-07 | End: 2022-03-07

## 2022-03-08 ENCOUNTER — APPOINTMENT (OUTPATIENT)
Dept: CARDIOTHORACIC SURGERY | Facility: CLINIC | Age: 87
End: 2022-03-08
Payer: MEDICARE

## 2022-03-08 ENCOUNTER — OUTPATIENT (OUTPATIENT)
Dept: OUTPATIENT SERVICES | Facility: HOSPITAL | Age: 87
LOS: 1 days | End: 2022-03-08
Payer: MEDICARE

## 2022-03-08 ENCOUNTER — NON-APPOINTMENT (OUTPATIENT)
Age: 87
End: 2022-03-08

## 2022-03-08 VITALS
TEMPERATURE: 97.3 F | WEIGHT: 90 LBS | OXYGEN SATURATION: 98 % | BODY MASS INDEX: 15.95 KG/M2 | SYSTOLIC BLOOD PRESSURE: 128 MMHG | RESPIRATION RATE: 14 BRPM | HEART RATE: 69 BPM | HEIGHT: 63 IN | DIASTOLIC BLOOD PRESSURE: 70 MMHG

## 2022-03-08 DIAGNOSIS — I50.9 HEART FAILURE, UNSPECIFIED: ICD-10-CM

## 2022-03-08 DIAGNOSIS — Z60.2 PROBLEMS RELATED TO LIVING ALONE: ICD-10-CM

## 2022-03-08 DIAGNOSIS — Z86.79 PERSONAL HISTORY OF OTHER DISEASES OF THE CIRCULATORY SYSTEM: ICD-10-CM

## 2022-03-08 DIAGNOSIS — I31.3 PERICARDIAL EFFUSION (NONINFLAMMATORY): ICD-10-CM

## 2022-03-08 DIAGNOSIS — M72.0 PALMAR FASCIAL FIBROMATOSIS [DUPUYTREN]: ICD-10-CM

## 2022-03-08 DIAGNOSIS — I35.0 NONRHEUMATIC AORTIC (VALVE) STENOSIS: ICD-10-CM

## 2022-03-08 DIAGNOSIS — I48.0 PAROXYSMAL ATRIAL FIBRILLATION: ICD-10-CM

## 2022-03-08 DIAGNOSIS — Z90.49 ACQUIRED ABSENCE OF OTHER SPECIFIED PARTS OF DIGESTIVE TRACT: Chronic | ICD-10-CM

## 2022-03-08 DIAGNOSIS — M19.90 UNSPECIFIED OSTEOARTHRITIS, UNSPECIFIED SITE: ICD-10-CM

## 2022-03-08 PROCEDURE — 93306 TTE W/DOPPLER COMPLETE: CPT

## 2022-03-08 PROCEDURE — 93306 TTE W/DOPPLER COMPLETE: CPT | Mod: 26

## 2022-03-08 PROCEDURE — 99204 OFFICE O/P NEW MOD 45 MIN: CPT

## 2022-03-08 PROCEDURE — 93000 ELECTROCARDIOGRAM COMPLETE: CPT

## 2022-03-08 PROCEDURE — 99203 OFFICE O/P NEW LOW 30 MIN: CPT

## 2022-03-08 RX ORDER — POTASSIUM CHLORIDE 40 MEQ/15ML
40 MEQ/15ML SOLUTION ORAL DAILY
Qty: 3 | Refills: 3 | Status: COMPLETED | COMMUNITY
Start: 2021-12-07 | End: 2022-03-08

## 2022-03-08 RX ORDER — FUROSEMIDE 40 MG/1
40 TABLET ORAL EVERY OTHER DAY
Refills: 0 | Status: ACTIVE | COMMUNITY

## 2022-03-08 RX ORDER — ZOLPIDEM TARTRATE 10 MG/1
10 TABLET, FILM COATED ORAL
Qty: 30 | Refills: 0 | Status: ACTIVE | COMMUNITY
Start: 2022-03-08

## 2022-03-08 SDOH — SOCIAL STABILITY - SOCIAL INSECURITY: PROBLEMS RELATED TO LIVING ALONE: Z60.2

## 2022-03-08 NOTE — HISTORY OF PRESENT ILLNESS
[FreeTextEntry1] : Patient is a 89 year old female with PMH afib (Xarelto), CHF, HTN, GERD, osteoarthritis, anxiety, iron deficiency anemia who was recently hospitalized for acute on chronic  diastolic CHF exacerbation and afib with RVR. Patient was evaluated by cardiology (Dr Mukherjee) who recommended diuresis with Lasix 40 mg IV BID. Patient was given 5 mg Lopressor x 2 for afib, started on Lopressor 50 mg TID, and started on Cardizem gtt.IV  Eventually, patient was \par transitioned to PO Cardizem 30 mg q6h and started on digoxin qod. HR improved , Cardizem was eventually transitioned to once a day 120 mg CD daily , dosing once heart rates stabilized. \par \par Patient had an echocardiogram done which showed LVEF of 50-55%, and moderate to severe aortic stenosis. Patient was evaluated by palliative care for GOC discussion, patient was made DNR/DNI. She was seen by Dr. Lopez for follow up and referred to valve clinic for consideration for JEAN. \par \par \par

## 2022-03-08 NOTE — REVIEW OF SYSTEMS
[Feeling Tired] : feeling tired [SOB on Exertion] : shortness of breath during exertion [Orthopnea] : orthopnea [Negative] : ENT [Cough] : no cough [Abdominal Pain] : no abdominal pain [Vomiting] : no vomiting [Heartburn] : no heartburn [Melena] : no melena [Confused] : no confusion [Dizziness] : no dizziness

## 2022-03-08 NOTE — ASSESSMENT
[FreeTextEntry1] : Patient is a 89 year old female with PMH afib (Xarelto), CHF, HTN, GERD, osteoarthritis, anxiety, iron deficiency anemia who was recently hospitalized for acute on chronic  diastolic CHF exacerbation and afib with RVR. Patient was evaluated by cardiology (Dr Mukherjee) who recommended diuresis with Lasix 40 mg IV BID. Patient was given 5 mg Lopressor x 2 for afib, started on Lopressor 50 mg TID, and started on Cardizem gtt.IV  Eventually, patient was \par transitioned to PO Cardizem 30 mg q6h and started on digoxin qod. HR improved , Cardizem was eventually transitioned to once a day 120 mg CD daily , dosing once heart rates stabilized. \par \par Patient had an echocardiogram done which showed LVEF of 50-55%, and moderate to severe aortic stenosis. Patient was evaluated by palliative care for GOC discussion, patient was made DNR/DNI. She was seen by Dr. Lopez for follow up and referred to valve clinic for consideration for JEAN. Echocardiogram on 12/7/2021 showed moderate AS and as per Dr. Lopez's office note patient is feeling well. \par \par I have reviewed the patient's medical records and imaging during the time of this office consultation and have made the following recommendations. Low flow AS with preserved EF. Long discussion with son in law and daughter regarding pursuing TAVR procedure. At this time, we will medically optimize patient and instructed family that she may return if they decide to pursue TAVR.\par \par Plan: \par 1) Increase Lasix 40mg to every day.\par 2) Return as needed

## 2022-03-08 NOTE — CONSULT LETTER
[Dear  ___] : Dear ~ZAN, [Courtesy Letter:] : I had the pleasure of seeing your patient, [unfilled], in my office today. [Please see my note below.] : Please see my note below. [Consult Closing:] : Thank you very much for allowing me to participate in the care of this patient.  If you have any questions, please do not hesitate to contact me. [Sincerely,] : Sincerely, [FreeTextEntry3] : Georges Villarreal MD, FACS\par Attending Surgeon \par Cardiovascular & Thoracic Surgery\par  \par [facility] [employer]\par

## 2022-03-08 NOTE — PHYSICAL EXAM
[General Appearance - Alert] : alert [General Appearance - In No Acute Distress] : in no acute distress [Jugular Venous Distention Increased] : there was no jugular-venous distention [Bibasilar Rales/Crackles] : bibasilar rales [III] : a grade 3 [II] : a grade 2 [Examination Of The Chest] : the chest was normal in appearance [Bowel Sounds] : normal bowel sounds [Abdomen Soft] : soft [Cervical Lymph Nodes Enlarged Posterior Bilaterally] : posterior cervical [Cervical Lymph Nodes Enlarged Anterior Bilaterally] : anterior cervical [No CVA Tenderness] : no ~M costovertebral angle tenderness [No Focal Deficits] : no focal deficits [Oriented To Time, Place, And Person] : oriented to person, place, and time [Impaired Insight] : insight and judgment were intact [Right Carotid Bruit] : no bruit heard over the right carotid [Left Carotid Bruit] : no bruit heard over the left carotid

## 2022-03-08 NOTE — ED PROVIDER NOTE - TOBACCO USE
Unknown if ever smoked Partial Purse String (Intermediate) Text: Given the location of the defect and the characteristics of the surrounding skin an intermediate purse string closure was deemed most appropriate.  Undermining was performed circumferentially around the surgical defect.  A purse string suture was then placed and tightened. Wound tension of the circular defect prevented complete closure of the wound.

## 2022-03-14 NOTE — CARDIOLOGY SUMMARY
[de-identified] : AFIB 65\par LVH\par LAFB [de-identified] : 12/7/21\par EF 55-60%, NIELS 0.6 sqcm, MGr 30 mmHg, pGr 50 mmHg, AoV 3.4 m/s, small pericardial effusion

## 2022-03-14 NOTE — HISTORY OF PRESENT ILLNESS
[FreeTextEntry1] : Mrs. Jeter is a 89 year old female referred by Dr. Lopez for evaluation of aortic stenosis. She presented to [facility] on 11/17/21 with dyspnea, was found to be in AFib with rapid ventricular response, and secondarily was found to be in acutely decompensated heart failure. TTE demonstrated moderate to severe aortic stenosis. She was diuresed and discharged with instructions to follow up in our valve clinic for discussion of possible TAVR. She has had no further hospitalizations since that time. Her dyspnea initially improved, however over the past several weeks her daughter has noticed that she be becomes short of breath when walking long distances or climbing stairs. Her diuretic regimen has been adjusted, and she is now taking furosemide every other day (due to hypotension). She has no angina, PND, orthopnea, dizziness, syncope, or edema. \par \par Her past medical history includes AFib (on Xarelto), CHF, HTN, GERD, OA and iron deficiency anemia.\par \par Repeat echocardiogram today demonstrates an NIELS of 0.9 sqcm, a DVI of 0.27, peak and mean gradients of 61 and 29 mmHg, and an AoV of 3.9 m/s; as per our review with Dr Aye Mckeon, Director of Structural Heart Echocardiography, these findings are consistent with moderate to severe AS.\par \par Her symptoms became worse in the fall when she developed PAF (now persistent AF). As her AF regimen was titrated, she has felt better. She lives alone and independently with an aide who comes a few times a week. She lives in [place] and her family is 2 hours away on [place]. She may become dyspneic "sometimes with activity" but it is not limiting her lifestyle. She reports episodes of chest tightness that last 2 to 3 minutes "sometimes when she is doing something and sometimes when she is lying down". She reports dizziness "quite often" and keeps a walker nearby "at all times". She has not syncopized. These symptoms have been present for "several years".

## 2022-03-14 NOTE — DISCUSSION/SUMMARY
[FreeTextEntry1] : Mrs Jeter has borderline severe AS with stable symptoms that have steadily improved as her medical regimen is being optimized. She is in AF that is rate controlled, for which she is also on OAC. Dr Villarreal and I had an extensive discussion with her and her family regarding the potential risks and benefits of JEAN. Those risks are not insignificant (death, stroke, the need for a PPM, and vascular complications), and given her age, overall frail stature, and the fact that she is still living independently without lifestyle-limiting symptoms at this time, we are all in agreement not to pursue JEAN for now (which would also require an angiogram and cardiac CT). The patient herself expressed her wishes not to do anything invasive at this time. Of course, should her symptoms (or her sentiments towards intervention) change, we are happy to re-evaluate her and consider intervention at that time. She will be tentatively scheduled to see us in September, or certainly sooner should the need arise.

## 2022-03-14 NOTE — PHYSICAL EXAM
[Well Developed] : well developed [Well Nourished] : well nourished [Normal Rate] : normal [Irregularly Irregular] : irregularly irregular [III] : a grade 3 [II] : a grade 2 [No Pitting Edema] : no pitting edema present [Soft] : abdomen soft [Non Tender] : non-tender [Normal Gait] : normal gait [No Edema] : no edema [No Rash] : no rash [Normal] : alert and oriented, normal memory [Rt] : no varicose veins of the right leg [Lt] : no varicose veins of the left leg [Right Carotid Bruit] : no bruit heard over the right carotid [Left Carotid Bruit] : no bruit heard over the left carotid [de-identified] : bibasilar rales

## 2022-03-14 NOTE — REVIEW OF SYSTEMS
[Dyspnea on exertion] : dyspnea during exertion [Change in Appetite] : change in appetite [Negative] : Heme/Lymph [Fever] : no fever [Chills] : no chills [Feeling Fatigued] : not feeling fatigued [Chest Discomfort] : no chest discomfort [Lower Ext Edema] : no extremity edema [Palpitations] : no palpitations [Orthopnea] : no orthopnea [PND] : no PND [Abdominal Pain] : no abdominal pain [Dizziness] : no dizziness [FreeTextEntry7] : decreased appetite

## 2022-04-25 ENCOUNTER — INPATIENT (INPATIENT)
Facility: HOSPITAL | Age: 87
LOS: 4 days | Discharge: HOME | DRG: 307 | End: 2022-04-30
Attending: INTERNAL MEDICINE | Admitting: INTERNAL MEDICINE
Payer: MEDICARE

## 2022-04-25 ENCOUNTER — TRANSCRIPTION ENCOUNTER (OUTPATIENT)
Age: 87
End: 2022-04-25

## 2022-04-25 VITALS
DIASTOLIC BLOOD PRESSURE: 72 MMHG | HEART RATE: 102 BPM | TEMPERATURE: 96 F | HEIGHT: 58 IN | RESPIRATION RATE: 22 BRPM | SYSTOLIC BLOOD PRESSURE: 105 MMHG | WEIGHT: 87.96 LBS

## 2022-04-25 DIAGNOSIS — N17.9 ACUTE KIDNEY FAILURE, UNSPECIFIED: ICD-10-CM

## 2022-04-25 DIAGNOSIS — Z29.9 ENCOUNTER FOR PROPHYLACTIC MEASURES, UNSPECIFIED: ICD-10-CM

## 2022-04-25 DIAGNOSIS — I35.0 NONRHEUMATIC AORTIC (VALVE) STENOSIS: ICD-10-CM

## 2022-04-25 DIAGNOSIS — K21.9 GASTRO-ESOPHAGEAL REFLUX DISEASE WITHOUT ESOPHAGITIS: ICD-10-CM

## 2022-04-25 DIAGNOSIS — I50.9 HEART FAILURE, UNSPECIFIED: ICD-10-CM

## 2022-04-25 DIAGNOSIS — Z90.49 ACQUIRED ABSENCE OF OTHER SPECIFIED PARTS OF DIGESTIVE TRACT: Chronic | ICD-10-CM

## 2022-04-25 DIAGNOSIS — H10.13 ACUTE ATOPIC CONJUNCTIVITIS, BILATERAL: ICD-10-CM

## 2022-04-25 DIAGNOSIS — F03.90 UNSPECIFIED DEMENTIA WITHOUT BEHAVIORAL DISTURBANCE: ICD-10-CM

## 2022-04-25 DIAGNOSIS — I48.20 CHRONIC ATRIAL FIBRILLATION, UNSPECIFIED: ICD-10-CM

## 2022-04-25 DIAGNOSIS — I10 ESSENTIAL (PRIMARY) HYPERTENSION: ICD-10-CM

## 2022-04-25 LAB
ALBUMIN SERPL ELPH-MCNC: 4.1 G/DL — SIGNIFICANT CHANGE UP (ref 3.3–5)
ALP SERPL-CCNC: 85 U/L — SIGNIFICANT CHANGE UP (ref 40–120)
ALT FLD-CCNC: 14 U/L — SIGNIFICANT CHANGE UP (ref 12–78)
ANION GAP SERPL CALC-SCNC: 16 MMOL/L — SIGNIFICANT CHANGE UP (ref 5–17)
APTT BLD: 33.1 SEC — SIGNIFICANT CHANGE UP (ref 27.5–35.5)
AST SERPL-CCNC: 17 U/L — SIGNIFICANT CHANGE UP (ref 15–37)
BASOPHILS # BLD AUTO: 0.02 K/UL — SIGNIFICANT CHANGE UP (ref 0–0.2)
BASOPHILS NFR BLD AUTO: 0.2 % — SIGNIFICANT CHANGE UP (ref 0–2)
BILIRUB SERPL-MCNC: 1 MG/DL — SIGNIFICANT CHANGE UP (ref 0.2–1.2)
BUN SERPL-MCNC: 53 MG/DL — HIGH (ref 7–23)
CALCIUM SERPL-MCNC: 9.9 MG/DL — SIGNIFICANT CHANGE UP (ref 8.5–10.1)
CHLORIDE SERPL-SCNC: 103 MMOL/L — SIGNIFICANT CHANGE UP (ref 96–108)
CK MB CFR SERPL CALC: 3 NG/ML — SIGNIFICANT CHANGE UP (ref 0–3.6)
CO2 SERPL-SCNC: 19 MMOL/L — LOW (ref 22–31)
CREAT SERPL-MCNC: 2 MG/DL — HIGH (ref 0.5–1.3)
DIGOXIN SERPL-MCNC: 1.3 NG/ML — SIGNIFICANT CHANGE UP (ref 0.8–2)
EGFR: 22 ML/MIN/1.73M2 — LOW
EOSINOPHIL # BLD AUTO: 0.01 K/UL — SIGNIFICANT CHANGE UP (ref 0–0.5)
EOSINOPHIL NFR BLD AUTO: 0.1 % — SIGNIFICANT CHANGE UP (ref 0–6)
GLUCOSE SERPL-MCNC: 105 MG/DL — HIGH (ref 70–99)
HCT VFR BLD CALC: 42.4 % — SIGNIFICANT CHANGE UP (ref 34.5–45)
HGB BLD-MCNC: 14.7 G/DL — SIGNIFICANT CHANGE UP (ref 11.5–15.5)
IMM GRANULOCYTES NFR BLD AUTO: 0.3 % — SIGNIFICANT CHANGE UP (ref 0–1.5)
INR BLD: 1.13 RATIO — SIGNIFICANT CHANGE UP (ref 0.88–1.16)
LYMPHOCYTES # BLD AUTO: 1.62 K/UL — SIGNIFICANT CHANGE UP (ref 1–3.3)
LYMPHOCYTES # BLD AUTO: 18.3 % — SIGNIFICANT CHANGE UP (ref 13–44)
MAGNESIUM SERPL-MCNC: 3.2 MG/DL — HIGH (ref 1.6–2.6)
MCHC RBC-ENTMCNC: 29.6 PG — SIGNIFICANT CHANGE UP (ref 27–34)
MCHC RBC-ENTMCNC: 34.7 GM/DL — SIGNIFICANT CHANGE UP (ref 32–36)
MCV RBC AUTO: 85.3 FL — SIGNIFICANT CHANGE UP (ref 80–100)
MONOCYTES # BLD AUTO: 0.7 K/UL — SIGNIFICANT CHANGE UP (ref 0–0.9)
MONOCYTES NFR BLD AUTO: 7.9 % — SIGNIFICANT CHANGE UP (ref 2–14)
NEUTROPHILS # BLD AUTO: 6.45 K/UL — SIGNIFICANT CHANGE UP (ref 1.8–7.4)
NEUTROPHILS NFR BLD AUTO: 73.2 % — SIGNIFICANT CHANGE UP (ref 43–77)
NRBC # BLD: 0 /100 WBCS — SIGNIFICANT CHANGE UP (ref 0–0)
NT-PROBNP SERPL-SCNC: 9188 PG/ML — HIGH (ref 0–450)
PLATELET # BLD AUTO: 234 K/UL — SIGNIFICANT CHANGE UP (ref 150–400)
POTASSIUM SERPL-MCNC: 4.3 MMOL/L — SIGNIFICANT CHANGE UP (ref 3.5–5.3)
POTASSIUM SERPL-SCNC: 4.3 MMOL/L — SIGNIFICANT CHANGE UP (ref 3.5–5.3)
PROT SERPL-MCNC: 7.9 G/DL — SIGNIFICANT CHANGE UP (ref 6–8.3)
PROTHROM AB SERPL-ACNC: 13.2 SEC — SIGNIFICANT CHANGE UP (ref 10.5–13.4)
RAPID RVP RESULT: SIGNIFICANT CHANGE UP
RBC # BLD: 4.97 M/UL — SIGNIFICANT CHANGE UP (ref 3.8–5.2)
RBC # FLD: 14 % — SIGNIFICANT CHANGE UP (ref 10.3–14.5)
SARS-COV-2 RNA SPEC QL NAA+PROBE: SIGNIFICANT CHANGE UP
SODIUM SERPL-SCNC: 138 MMOL/L — SIGNIFICANT CHANGE UP (ref 135–145)
TROPONIN I, HIGH SENSITIVITY RESULT: 116.5 NG/L — HIGH
TROPONIN I, HIGH SENSITIVITY RESULT: 86.9 NG/L — HIGH
WBC # BLD: 8.83 K/UL — SIGNIFICANT CHANGE UP (ref 3.8–10.5)
WBC # FLD AUTO: 8.83 K/UL — SIGNIFICANT CHANGE UP (ref 3.8–10.5)

## 2022-04-25 PROCEDURE — 99285 EMERGENCY DEPT VISIT HI MDM: CPT

## 2022-04-25 PROCEDURE — 99223 1ST HOSP IP/OBS HIGH 75: CPT

## 2022-04-25 PROCEDURE — 93010 ELECTROCARDIOGRAM REPORT: CPT

## 2022-04-25 PROCEDURE — 71045 X-RAY EXAM CHEST 1 VIEW: CPT | Mod: 26

## 2022-04-25 RX ORDER — METOPROLOL TARTRATE 50 MG
1 TABLET ORAL
Qty: 0 | Refills: 0 | DISCHARGE

## 2022-04-25 RX ORDER — RIVAROXABAN 15 MG-20MG
15 KIT ORAL DAILY
Refills: 0 | Status: DISCONTINUED | OUTPATIENT
Start: 2022-04-25 | End: 2022-04-25

## 2022-04-25 RX ORDER — MEMANTINE HYDROCHLORIDE 10 MG/1
28 TABLET ORAL DAILY
Refills: 0 | Status: DISCONTINUED | OUTPATIENT
Start: 2022-04-25 | End: 2022-04-26

## 2022-04-25 RX ORDER — OLOPATADINE HYDROCHLORIDE 1 MG/ML
1 SOLUTION/ DROPS OPHTHALMIC
Qty: 0 | Refills: 0 | DISCHARGE

## 2022-04-25 RX ORDER — METOPROLOL TARTRATE 50 MG
100 TABLET ORAL DAILY
Refills: 0 | Status: DISCONTINUED | OUTPATIENT
Start: 2022-04-25 | End: 2022-04-30

## 2022-04-25 RX ORDER — PANTOPRAZOLE SODIUM 20 MG/1
1 TABLET, DELAYED RELEASE ORAL
Qty: 0 | Refills: 0 | DISCHARGE

## 2022-04-25 RX ORDER — IRON BG/IRON PS CPLX/C/CA-THR 150MG-50MG
1 CAPSULE ORAL
Qty: 0 | Refills: 0 | DISCHARGE

## 2022-04-25 RX ORDER — RIVAROXABAN 15 MG-20MG
15 KIT ORAL ONCE
Refills: 0 | Status: COMPLETED | OUTPATIENT
Start: 2022-04-25 | End: 2022-04-25

## 2022-04-25 RX ORDER — MEMANTINE HYDROCHLORIDE 10 MG/1
28 TABLET ORAL DAILY
Refills: 0 | Status: DISCONTINUED | OUTPATIENT
Start: 2022-04-25 | End: 2022-04-25

## 2022-04-25 RX ORDER — DILTIAZEM HCL 120 MG
10 CAPSULE, EXT RELEASE 24 HR ORAL ONCE
Refills: 0 | Status: COMPLETED | OUTPATIENT
Start: 2022-04-25 | End: 2022-04-25

## 2022-04-25 RX ORDER — MEMANTINE HYDROCHLORIDE 10 MG/1
1 TABLET ORAL
Qty: 0 | Refills: 0 | DISCHARGE

## 2022-04-25 RX ORDER — ZOLPIDEM TARTRATE 10 MG/1
1 TABLET ORAL
Qty: 0 | Refills: 0 | DISCHARGE

## 2022-04-25 RX ORDER — SPIRONOLACTONE 25 MG/1
1 TABLET, FILM COATED ORAL
Qty: 0 | Refills: 0 | DISCHARGE

## 2022-04-25 RX ORDER — FUROSEMIDE 40 MG
20 TABLET ORAL ONCE
Refills: 0 | Status: COMPLETED | OUTPATIENT
Start: 2022-04-25 | End: 2022-04-25

## 2022-04-25 RX ORDER — CHOLECALCIFEROL (VITAMIN D3) 125 MCG
1 CAPSULE ORAL
Qty: 0 | Refills: 0 | DISCHARGE

## 2022-04-25 RX ORDER — DIGOXIN 250 MCG
125 TABLET ORAL
Refills: 0 | Status: DISCONTINUED | OUTPATIENT
Start: 2022-04-26 | End: 2022-04-30

## 2022-04-25 RX ORDER — PANTOPRAZOLE SODIUM 20 MG/1
40 TABLET, DELAYED RELEASE ORAL DAILY
Refills: 0 | Status: DISCONTINUED | OUTPATIENT
Start: 2022-04-25 | End: 2022-04-30

## 2022-04-25 RX ORDER — RIVAROXABAN 15 MG-20MG
15 KIT ORAL
Refills: 0 | Status: DISCONTINUED | OUTPATIENT
Start: 2022-04-26 | End: 2022-04-30

## 2022-04-25 RX ORDER — DILTIAZEM HCL 120 MG
120 CAPSULE, EXT RELEASE 24 HR ORAL DAILY
Refills: 0 | Status: DISCONTINUED | OUTPATIENT
Start: 2022-04-25 | End: 2022-04-30

## 2022-04-25 RX ORDER — ZOLPIDEM TARTRATE 10 MG/1
5 TABLET ORAL AT BEDTIME
Refills: 0 | Status: DISCONTINUED | OUTPATIENT
Start: 2022-04-25 | End: 2022-04-30

## 2022-04-25 RX ADMIN — ZOLPIDEM TARTRATE 5 MILLIGRAM(S): 10 TABLET ORAL at 23:21

## 2022-04-25 RX ADMIN — RIVAROXABAN 15 MILLIGRAM(S): KIT at 23:21

## 2022-04-25 RX ADMIN — Medication 20 MILLIGRAM(S): at 15:32

## 2022-04-25 NOTE — H&P ADULT - PROBLEM SELECTOR PLAN 3
Chronic, EKG showing rate-controlled AFib  - Continue digoxin 125 mcg every other day, Dig level 1.3 on admission  - Continue Toprol  mg daily, Cardizem 120 mg daily with hold parameters  - Continue Xarelto 15 mg daily Cr on admission 2.0, baseline appears to be around 1.0  - Likely secondary to decreased effective circulating volume due to aortic stenosis vs overdiuresis  - Trend renal indices,  - Avoid nephrotoxic agents and IV contrast agents if possible  - Received Lasix 20 mg IVP in ED  - Decrease Lasix to 40 mg every other day, and closely monitor daily creatinine  - Avoid IVF at this time due to AS

## 2022-04-25 NOTE — H&P ADULT - PROBLEM SELECTOR PLAN 1
Pt presenting with increased dyspnea on exertion, chest discomfort for 3 days  - Likely 2/2 symptomatic aortic stenosis  - Prior echo from March 2022 showing LVEF of 65-70%, moderate to severe aortic stenosis  - Cardiology Dr. Alegre consulted in ED, pt accepted for transfer to [facility] for structural heart evaluation (accepting physician Dr. Haynes)  - As per cardio, pt is poor candidate for TAVR but may benefit from repeat structural heart evaluation and possible BAV  - Decrease Lasix to 40 mg every other day  - Strict Is/Os, daily weights, fluid restriction to 1 L daily Chest Pressure/ YOUNG likely 2/2 Severe AS.  + Troponin ,likely 2/2 Demand ischemia, also 2/2 BRADY with decrease clearance.  -Serial CPK/MB/Troponin -q 8 hrs   -pt had Recent ECHO March 2022  -Cardiology-Dr Alegre

## 2022-04-25 NOTE — H&P ADULT - RS GEN PE MLT RESP DETAILS PC
breath sounds equal/good air movement/clear to auscultation bilaterally/no chest wall tenderness/no rales/no rhonchi/no wheezes

## 2022-04-25 NOTE — H&P ADULT - PROBLEM SELECTOR PLAN 2
Cr on admission 2.0, baseline appears to be around 1.0  - Likely secondary to decreased effective circulating volume due to aortic stenosis vs overdiuresis  - Trend renal indices  - Avoid nephrotoxic agents and IV contrast agents if possible  - Received Lasix 20 mg IVP in ED  - Decrease Lasix to 40 mg every other day, and closely monitor daily creatinine  - Avoid IVF at this time due to AS Pt presenting with increased dyspnea on exertion, chest discomfort for 3 days  - Likely 2/2 symptomatic aortic stenosis  - Prior echo from March 2022 showing LVEF of 65-70%, moderate to severe aortic stenosis  - Cardiology Dr. Alegre consulted in ED, pt accepted for transfer to [facility] for structural heart evaluation (accepting physician Dr. Haynes)  - As per cardio, pt is poor candidate for TAVR but may benefit from repeat structural heart evaluation and possible BAV  - Decrease Lasix to 40 mg every other day  - Strict Is/Os, daily weights, fluid restriction to 1 L daily

## 2022-04-25 NOTE — H&P ADULT - ASSESSMENT
89 y/o female with PMHx of Afib on Xarelto, CHF, HTN, GERD, osteoarthritis, anxiety, iron deficiency anemia who presents with 3 days of worsening dyspnea on exertion along with chest discomfort, likely secondary to symptomatic aortic stenosis.   89 y/o female with PMHx of Afib on Xarelto, CHF, HTN, GERD, osteoarthritis, anxiety, iron deficiency anemia who presents with 3 days of worsening dyspnea on exertion along with chest discomfort, likely secondary to Chest  pressure & YOUNG  likely symptomatic aortic stenosis ,BRADY,, chronic Diastolic CHF .P Afib, HTN,

## 2022-04-25 NOTE — ED ADULT TRIAGE NOTE - CHIEF COMPLAINT QUOTE
Patient A/Ox4 c/o chest pain and SOB for "a few days". Was recently seen for CHF. Patient with labored breathing in triage.

## 2022-04-25 NOTE — CONSULT NOTE ADULT - NS ATTEND AMEND GEN_ALL_CORE FT
Patient with severe symptomatic AS, having been evaluated in the past few months at , but deferred as she was still able to live independently.  At this point, her symptoms have progressed, and she has new acute renal failure.  She is a poor candidate for TAVR, but may benefit from a repeat evaluation from the structural heart team, and may possibly benefit from BAV.  Spoke to DR. Haynes, and plan is to transfer to  for renal and structural heart eval.  Continue Lasix QOD, but need to follow creatinine very closely.  I explained that overall prognosis is poor, and that patient may be best suited for hospice.

## 2022-04-25 NOTE — H&P ADULT - PROBLEM SELECTOR PLAN 5
Continue home memantine 28 mg daily Continue Toprol  mg, Cardizem CD 20 mg daily, Lasix 40 mg QOD with hold parameters  - Hold spironolactone 50 mg daily for now to avoid overdiuresis, can resume in AM if needed  - Monitor routine hemodynamics

## 2022-04-25 NOTE — CONSULT NOTE ADULT - NS_MD_PANP_GEN_ALL_CORE
[FreeTextEntry1] : \par 65 y/o F with PMHx of hypercholesterolemia, HTN, MVP w/ mild regurgitation, presenting for a follow up appointment.\par \par #HTN\par - BP elevated 144/76 on my exam, in the setting of not taking her BP med for 2 weeks\par - c/w lisinopril 5mg daily\par \par #Hypercholesterolemia\par - c/w atorvastatin 40mg daily\par \par #HCM\par - Mammogram 05/2019 Birads 2\par - Colonoscopy 03/2019 - repeat in 5 years\par - DEXA 05/2019 - osteopenia\par - Pneumovax 2003\par - Give Prevnar today, and Pneumovax in 12 months\par  \par RTC for CPE in November\par 
Attending and PA/NP shared services statement (NON-critical care):

## 2022-04-25 NOTE — ED PROVIDER NOTE - CLINICAL SUMMARY MEDICAL DECISION MAKING FREE TEXT BOX
chest pain, shortness of breath, place on cardiac monitor, pulse oximeter, send labs, cardiac enzymes, chest xray, cardio consult

## 2022-04-25 NOTE — CONSULT NOTE ADULT - SUBJECTIVE AND OBJECTIVE BOX
[facility] - Sage Monzon, Jessica Gray, Sis, Lonny, Ender Mason  Office Number: [phone]    Initial Consult Note  CHIEF COMPLAINT: Patient is a 89y old  Female who presents with a chief complaint of chest pain   HPI: 89 female  PMH afib on Xarelto, CHF, HTN, GERD, osteoarthritis, anxiety, iron deficiency anemia presents to ER with daughter c/o chest pain, left sided, radiating to left arm, shortness of breath, worse with exertion for the past few days, generalized weakness, denies fever. Patient took lasix 40mg po and metoprolol 100mg today. She had seen for evaluation of TAVR in 03/2022 she is now taking furosemide every other day (due to hypotension).     Echocardiogram demonstrates an NIELS of 0.9 sqcm, a DVI of 0.27, peak and mean gradients of 61 and 29 mmHg, and an AoV of 3.9 m/s; as per our review with Dr Aye Mckeon, Director of Structural Heart Echocardiography, these findings are consistent with moderate to severe AS.    She lives alone and independently with an aide who comes a few times a week. She lives in [place]     Allergies    No Known Allergies    Intolerances      PAST MEDICAL & SURGICAL HISTORY:  Chronic atrial fibrillation    CHF, chronic    Hypertension    Hypertension    Osteoarthritis    Dementia    GERD (gastroesophageal reflux disease)    Iron deficiency anemia    History of cholecystectomy      MEDICATIONS  (STANDING):    MEDICATIONS  (PRN):    FAMILY HISTORY:  Family history of breast cancer (Mother)    FH: diabetes mellitus (Father)     No family history of acute MI or sudden cardiac death.  SOCIAL HISTORY: No active tobacco, ethanol, or drug abuse.    REVIEW OF SYSTEMS   All other review of systems is negative unless indicated above.    VITAL SIGNS:   Vital Signs Last 24 Hrs  T(C): 35.5 (25 Apr 2022 13:38), Max: 35.5 (25 Apr 2022 13:38)  T(F): 95.9 (25 Apr 2022 13:38), Max: 95.9 (25 Apr 2022 13:38)  HR: 79 (25 Apr 2022 15:29) (79 - 102)  BP: 125/56 (25 Apr 2022 15:29) (105/72 - 125/56)  BP(mean): --  RR: 19 (25 Apr 2022 15:29) (19 - 22)  SpO2: 100% (25 Apr 2022 15:29) (100% - 100%)    Physical Exam:  Constitutional: NAD, awake and alert,   HEENT: Moist Mucous Membranes, Anicteric  Pulmonary: Non-labored, breath sounds are clear bilaterally, No wheezing, rales or rhonchi  Cardiovascular: Regular, S1 and S2, No murmurs, rubs, gallops or clicks  Gastrointestinal: Bowel Sounds present, soft, nontender.   Lymph: No peripheral edema. No lymphadenopathy.  Skin: No visible rashes or ulcers.  Psych:  Mood & affect appropriate    I&O's Summary      LABS: All Labs Reviewed:                        14.7   8.83  )-----------( 234      ( 25 Apr 2022 14:34 )             42.4     25 Apr 2022 14:34    138    |  103    |  53     ----------------------------<  105    4.3     |  19     |  2.00     Ca    9.9        25 Apr 2022 14:34  Mg     3.2       25 Apr 2022 14:34    TPro  7.9    /  Alb  4.1    /  TBili  1.0    /  DBili  x      /  AST  17     /  ALT  14     /  AlkPhos  85     25 Apr 2022 14:34    PT/INR - ( 25 Apr 2022 14:34 )   PT: 13.2 sec;   INR: 1.13 ratio         PTT - ( 25 Apr 2022 14:34 )  PTT:33.1 secTroponin I, High Sensitivity Result: 86.9 ng/L (04-25-22 @ 14:34)  Serum Pro-Brain Natriuretic Peptide: 9188 pg/mL (04-25-22 @ 14:34)    Blood Culture:       Patient name: LINDSAY SALDAÑA  YOB: 1924   Age: 89 (F)   MR#: [id number]  Study Date: 3/8/2022  Location: O/PSonographer: Domenica Thomas RDCS  Study quality: Technically good  Referring Physician: Khari Haynes MD / Georges Villarreal MD  Blood Pressure: 124/64 mmHg  Height: 145 cm  Weight: 42 kg  BSA: 1.3 m2  Heart Rate: 59 mmHg  ------------------------------------------------------------------------  PROCEDURE: Transthoracic echocardiogram with 2-D, M-Mode  and complete spectral and color flow Doppler.  INDICATION: Nonrheumatic aortic (valve) stenosis (I35.0)  ------------------------------------------------------------------------  MEASUREMENTS: (See below)  ------------------------------------------------------------------------  OBSERVATIONS:  Mitral Valve: There is extensive mitral annular, leaflets  and subvalvular calcification. Mild mitral regurgitation.  The peak/mean gradients= 4/1mmHg (HR= 61bpm).  Aortic Root: Aortic Root: 2.9 cm.  Ascending Aorta: 3.1 cm.  LVOT diameter: 2 cm.  Aortic Valve: Calcified trileaflet aortic valve with  decreased opening.  There is LVOT calcification. Peak transaortic valve  gradient equals 61 mm Hg, mean transaortic valve gradient  equals 29 mm Hg, estimated aortic valve area equals 0.9  sqcm (by continuity equation), aortic valve velocity time  integral equals 88 cm, the DVI= 0.27, consistent with  moderate-severe aortic stenosis. Minimal aortic  regurgitation.  Peak left ventricular outflow tract  gradient equals 4 mm Hg, mean gradient is equal to 3 mm Hg,  LVOT velocity time integral equals 24 cm.  Left Atrium: Severely dilated left atrium.  LA volume index  = 91 cc/m2.  Left Ventricle: The left ventricular function is increased.   The LVEF= 65-70%. No regional wall motion abnormalities.  Normal left ventricular size with moderate concentric  hypertrophy.  Right Heart: Normal right atrium. Normal right ventricular  size and function. Normal tricuspid valve. Moderate  tricuspid regurgitation. Normal pulmonic valve. No pulmonic  regurgitation.  Pericardium/PleuraThere is a trace pericardial effusion.  Hemodynamic: The estimated right atrial pressure is normal.  Estimated right ventricular systolic pressure equals 36 mm  Hg, assuming right atrial pressure equals 5 mm Hg,  consistent with borderline pulmonary hypertension.  ------------------------------------------------------------------------  CONCLUSIONS:  1. Calcified trileaflet aortic valve with decreased  opening. Peak transaortic valve gradient equals 61 mm Hg,  mean transaortic valve gradient equals 29 mm Hg, estimated  aortic valve area equals 0.9 sqcm (by continuity equation),  aortic valve velocity time integral equals 88 cm, the DVI=  0.27, consistent with moderate-severe aortic stenosis. No  aortic valve regurgitation seen.  *** No previous Echo exam.  ------------------------------------------------------------------------  PROCEDURE DESCRIPTION: Transthoracic echocardiogram with  2-D, M-Mode and complete spectral and color flow Doppler.  ------------------------------------------------------------------------  ECHOCARDIOGRAPHIC EXAMINATION:  AORTIC ROOT:  Aortic Root (Leaflet): 2.9 cm  Aortic Root Index: 1.0  Aortic Tubular: 3.1 cm  LEFT ATRIUM:  AP Dimensions: 4.1 cm  LA Volume Index: 91.00 cc/sqm  LA Volume: 117.8 cc  LEFT VENTRICLE:  LVIDd: 4.1 cm  LVIDs: 2.6 cm  Fraction Short: 37 %  IVS: 1.49  ILWT: 1.4 cm  RWT: 0.70  LV Mass: 234.0 gm  LV Mass Index: 180 gm/sqm  EF (Visual Estimate): 65-70%  HR and BP:  HR: 59 bpm  BP: 124/64  BSA: 1.30  ------------------------------------------------------------------------  HEMODYNAMICS:  LA Pressure Estimate: < 20  PAS: 36  IVRT: 106 ms  ------------------------------------------------------------------------  COLOR FLOW and SPECIAL DOPPLER:  AORTIC VALVE:  AV Peak Velocity: 3.9 M/sec  AV Peak Gradient: 60 mm Hg  AV Mean Gradient: 29 mm Hg  Valve Area: 0.9 sqcm  LVOT:  LVOT Velocity: 1.0 M/sec  LVOT Diameter: 2.0 cm  LVOT Peak Gradient Rest: 4 mm Hg  LVOT Mean Gradient Rest: 3 mm Hg  ------------------------------------------------------------------------  DIASTOLIC FUNCTION:  DT:303 ms  e' Septal: 5.0 cm/s  E/e' Septal: 20.0  e' Lateral: 6.0 cm/s  E/e' Lateral: 16.6  MV E wave: 1.0 m/s  ------------------------------------------------------------------------  Confirmed on  3/8/2022 - 10:15:38 by Aye Mckeon M.D.  ------------------------------------------------------------------------   [facility] - Sage Monzon, Jessica Gray, Sis, Lonny, Ender Mason  Office Number: [phone]    Initial Consult Note  CHIEF COMPLAINT: Patient is a 89y old  Female who presents with a chief complaint of chest pain   HPI: 89 female  PMH afib on Xarelto, CHF, HTN, GERD, osteoarthritis, anxiety, iron deficiency anemia presents to ER with daughter c/o chest pain, left sided, radiating to left arm, shortness of breath, worse with exertion for the past few days and generalized weakness, Patient did have some YOUNG in past, now its worse in past few days which limited her ability to ambulate. She also has h/o chest tightness, pain this time is more pressure pain, which comes and goes. Had achy pain in both arms today which prompted ER visit.  Patient takes Lasix 40 mg every other day and took dose along with metoprolol 100mg today. She had seen for evaluation of TAVR in 03/2022 and deemed not a  candidate to proceed. She is now taking furosemide every other day (due to hypotension).      Echocardiogram 03/2022 demonstrates an NIELS of 0.9 sqcm, a DVI of 0.27, peak and mean gradients of 61 and 29 mmHg, and an AoV of 3.9 m/s; as per our review with Dr Aye Mckeon, Director of Structural Heart Echocardiography, these findings are consistent with moderate to severe AS.    Allergies  No Known Allergies    PAST MEDICAL & SURGICAL HISTORY:  Chronic atrial fibrillation    CHF, chronic    Hypertension    Hypertension    Osteoarthritis    Dementia    GERD (gastroesophageal reflux disease)    Iron deficiency anemia    History of cholecystectomy      MEDICATIONS  (STANDING):    MEDICATIONS  (PRN):    FAMILY HISTORY:  Family history of breast cancer (Mother)    FH: diabetes mellitus (Father)  No family history of acute MI or sudden cardiac death.    SOCIAL HISTORY: No active tobacco, ethanol, or drug abuse.  She lives alone and independently with an aide who comes a few times a week. She lives in [place]     REVIEW OF SYSTEMS   All other review of systems is negative unless indicated above.    VITAL SIGNS:   Vital Signs Last 24 Hrs  T(C): 35.5 (25 Apr 2022 13:38), Max: 35.5 (25 Apr 2022 13:38)  T(F): 95.9 (25 Apr 2022 13:38), Max: 95.9 (25 Apr 2022 13:38)  HR: 79 (25 Apr 2022 15:29) (79 - 102)  BP: 125/56 (25 Apr 2022 15:29) (105/72 - 125/56)  BP(mean): --  RR: 19 (25 Apr 2022 15:29) (19 - 22)  SpO2: 100% (25 Apr 2022 15:29) (100% - 100%)    Physical Exam:  Constitutional: NAD, awake and alert, Frail   HEENT: Moist Mucous Membranes, Anicteric  Pulmonary: Non-labored, breath sounds are clear bilaterally, No wheezing, rales or rhonchi  Cardiovascular: Regular, S1 and S2, + murmurs, rubs, gallops or clicks  Gastrointestinal: Bowel Sounds present, soft, nontender.   Lymph: No peripheral edema. No lymphadenopathy.  Skin: No visible rashes or ulcers.  Psych:  Mood & affect appropriate    I&O's Summary      LABS: All Labs Reviewed:                        14.7   8.83  )-----------( 234      ( 25 Apr 2022 14:34 )             42.4     25 Apr 2022 14:34    138    |  103    |  53     ----------------------------<  105    4.3     |  19     |  2.00     Ca    9.9        25 Apr 2022 14:34  Mg     3.2       25 Apr 2022 14:34    TPro  7.9    /  Alb  4.1    /  TBili  1.0    /  DBili  x      /  AST  17     /  ALT  14     /  AlkPhos  85     25 Apr 2022 14:34    PT/INR - ( 25 Apr 2022 14:34 )   PT: 13.2 sec;   INR: 1.13 ratio         PTT - ( 25 Apr 2022 14:34 )  PTT:33.1 secTroponin I, High Sensitivity Result: 86.9 ng/L (04-25-22 @ 14:34)  Serum Pro-Brain Natriuretic Peptide: 9188 pg/mL (04-25-22 @ 14:34)    Blood Culture:       Patient name: LINDSAY SALDAÑA  YOB: 1924   Age: 89 (F)   MR#: [id number]  Study Date: 3/8/2022  Location: O/PSonographer: Domenica Thomas RDCS  Study quality: Technically good  Referring Physician: Khari Haynes MD / Georges Villarreal MD  Blood Pressure: 124/64 mmHg  Height: 145 cm  Weight: 42 kg  BSA: 1.3 m2  Heart Rate: 59 mmHg  ------------------------------------------------------------------------  PROCEDURE: Transthoracic echocardiogram with 2-D, M-Mode  and complete spectral and color flow Doppler.  INDICATION: Nonrheumatic aortic (valve) stenosis (I35.0)  ------------------------------------------------------------------------  MEASUREMENTS: (See below)  ------------------------------------------------------------------------  OBSERVATIONS:  Mitral Valve: There is extensive mitral annular, leaflets  and subvalvular calcification. Mild mitral regurgitation.  The peak/mean gradients= 4/1mmHg (HR= 61bpm).  Aortic Root: Aortic Root: 2.9 cm.  Ascending Aorta: 3.1 cm.  LVOT diameter: 2 cm.  Aortic Valve: Calcified trileaflet aortic valve with  decreased opening.  There is LVOT calcification. Peak transaortic valve  gradient equals 61 mm Hg, mean transaortic valve gradient  equals 29 mm Hg, estimated aortic valve area equals 0.9  sqcm (by continuity equation), aortic valve velocity time  integral equals 88 cm, the DVI= 0.27, consistent with  moderate-severe aortic stenosis. Minimal aortic  regurgitation.  Peak left ventricular outflow tract  gradient equals 4 mm Hg, mean gradient is equal to 3 mm Hg,  LVOT velocity time integral equals 24 cm.  Left Atrium: Severely dilated left atrium.  LA volume index  = 91 cc/m2.  Left Ventricle: The left ventricular function is increased.   The LVEF= 65-70%. No regional wall motion abnormalities.  Normal left ventricular size with moderate concentric  hypertrophy.  Right Heart: Normal right atrium. Normal right ventricular  size and function. Normal tricuspid valve. Moderate  tricuspid regurgitation. Normal pulmonic valve. No pulmonic  regurgitation.  Pericardium/PleuraThere is a trace pericardial effusion.  Hemodynamic: The estimated right atrial pressure is normal.  Estimated right ventricular systolic pressure equals 36 mm  Hg, assuming right atrial pressure equals 5 mm Hg,  consistent with borderline pulmonary hypertension.  ------------------------------------------------------------------------  CONCLUSIONS:  1. Calcified trileaflet aortic valve with decreased  opening. Peak transaortic valve gradient equals 61 mm Hg,  mean transaortic valve gradient equals 29 mm Hg, estimated  aortic valve area equals 0.9 sqcm (by continuity equation),  aortic valve velocity time integral equals 88 cm, the DVI=  0.27, consistent with moderate-severe aortic stenosis. No  aortic valve regurgitation seen.  *** No previous Echo exam.  ------------------------------------------------------------------------  PROCEDURE DESCRIPTION: Transthoracic echocardiogram with  2-D, M-Mode and complete spectral and color flow Doppler.  ------------------------------------------------------------------------  ECHOCARDIOGRAPHIC EXAMINATION:  AORTIC ROOT:  Aortic Root (Leaflet): 2.9 cm  Aortic Root Index: 1.0  Aortic Tubular: 3.1 cm  LEFT ATRIUM:  AP Dimensions: 4.1 cm  LA Volume Index: 91.00 cc/sqm  LA Volume: 117.8 cc  LEFT VENTRICLE:  LVIDd: 4.1 cm  LVIDs: 2.6 cm  Fraction Short: 37 %  IVS: 1.49  ILWT: 1.4 cm  RWT: 0.70  LV Mass: 234.0 gm  LV Mass Index: 180 gm/sqm  EF (Visual Estimate): 65-70%  HR and BP:  HR: 59 bpm  BP: 124/64  BSA: 1.30  ------------------------------------------------------------------------  HEMODYNAMICS:  LA Pressure Estimate: < 20  PAS: 36  IVRT: 106 ms  ------------------------------------------------------------------------  COLOR FLOW and SPECIAL DOPPLER:  AORTIC VALVE:  AV Peak Velocity: 3.9 M/sec  AV Peak Gradient: 60 mm Hg  AV Mean Gradient: 29 mm Hg  Valve Area: 0.9 sqcm  LVOT:  LVOT Velocity: 1.0 M/sec  LVOT Diameter: 2.0 cm  LVOT Peak Gradient Rest: 4 mm Hg  LVOT Mean Gradient Rest: 3 mm Hg  ------------------------------------------------------------------------  DIASTOLIC FUNCTION:  DT:303 ms  e' Septal: 5.0 cm/s  E/e' Septal: 20.0  e' Lateral: 6.0 cm/s  E/e' Lateral: 16.6  MV E wave: 1.0 m/s  ------------------------------------------------------------------------  Confirmed on  3/8/2022 - 10:15:38 by Aye Mckeon M.D.  ------------------------------------------------------------------------

## 2022-04-25 NOTE — H&P ADULT - PROBLEM SELECTOR PLAN 4
Continue Toprol  mg, Cardizem 120 mg daily, Lasix 40 mg QOD with hold parameters  - Hold spironolactone 50 mg daily for now to avoid overdiuresis, can resume in AM if needed  - Monitor routine hemodynamics Chronic, EKG showing rate-controlled AFib  - Continue digoxin 125 mcg every other day, Dig level 1.3 on admission  - Continue Toprol  mg daily, Cardizem 120 mg daily with hold parameters  - Continue Xarelto 15 mg daily

## 2022-04-25 NOTE — H&P ADULT - PROBLEM SELECTOR PLAN 6
Pt on pantoprazole 20 mg PO daily  - Start Protonix 40 mg IVP daily while inpatient Continue home memantine 28 mg daily

## 2022-04-25 NOTE — H&P ADULT - PROBLEM SELECTOR PLAN 7
Pt on home olopatadine drops for allergic conjunctivitis, hold while inpatient Pt on pantoprazole 20 mg PO daily  - Start Protonix 40 mg IVP daily while inpatient

## 2022-04-25 NOTE — H&P ADULT - HISTORY OF PRESENT ILLNESS
This is a 89 y/o female with PMHx of Afib on Xarelto, CHF, HTN, GERD, osteoarthritis, anxiety, iron deficiency anemia who presents with 3 days of worsening dyspnea on exertion along with chest discomfort. Hx was obtained from pt's daughter, as hx was limited due to patient's dementia. At baseline, pt lives alone with help from an aide, but is able to perform most of her ADLs and cook on her own without difficulty. However, pt has had increasing difficulty with ADLs due to her shortness of breath. Pt also has midline back pain radiating to both arms and some chest discomfort that occurs when the patient is short of breath. She has not had any fevers, chills, lightheadedness, palpitations, abd pain, constipation/diarrhea, dysuria, or pain/swelling in the legs. Pt has no other complaints or concerns at this time, and only current symptom is L-sided back/arm pain.    ED course:  T 95.9, , /72, RR 22, SpO2 100% on 2L NC  Labs significant for BUN/Cr 53/2.00, BNP 9188, TropI 86.9  CXR negative for acute pathology  EKG AFib, rate 86, LAFB, nonspecific ST/T wave abnormality  Given Lasix 20 mg IVP, Cardizem 10 mg IVP in ED

## 2022-04-25 NOTE — H&P ADULT - NSHPSOCIALHISTORY_GEN_ALL_CORE
Lives at home alone, ambulates with walker, has aide to help with ADLs/IADLs  Denies alcohol, tobacco, recreational drug use  Vaccinated for COVID x 3 (Moderna)

## 2022-04-25 NOTE — ED ADULT NURSE NOTE - NSIMPLEMENTINTERV_GEN_ALL_ED
Implemented All Fall with Harm Risk Interventions:  [place] to call system. Call bell, personal items and telephone within reach. Instruct patient to call for assistance. Room bathroom lighting operational. Non-slip footwear when patient is off stretcher. Physically safe environment: no spills, clutter or unnecessary equipment. Stretcher in lowest position, wheels locked, appropriate side rails in place. Provide visual cue, wrist band, yellow gown, etc. Monitor gait and stability. Monitor for mental status changes and reorient to person, place, and time. Review medications for side effects contributing to fall risk. Reinforce activity limits and safety measures with patient and family. Provide visual clues: red socks.

## 2022-04-25 NOTE — PHARMACOTHERAPY INTERVENTION NOTE - COMMENTS
Patient currently ordered for Xarelto 15 mg daily for afib. Discussed with team (Dr. Prasad) and recommended adding special instructions to be given with a meal to help increase bioavailability of medication. Also recommended to give a STAT dose because due to routine timing patient will not received dose until tomorrow. MD agreed and order updated. Patient currently ordered for Xarelto 15 mg daily for afib. Discussed with team (Dr. Prasad) and recommended adding special instructions to be given with a meal to help increase bioavailability of medication. Also recommended to give a STAT dose because per MD patient did not receive dose today and due to routine timing will not receive dose until tomorrow. MD agreed and order updated.

## 2022-04-25 NOTE — ED PROVIDER NOTE - OBJECTIVE STATEMENT
89 female presents to ER with daughter c/o chest pain, left sided, radiating to left arm, shortness of breath, worse with exertion for the past few days, generalized weakness, denies fever. Patient took lasix 40mg po and metoprolol 100mg today.

## 2022-04-25 NOTE — PATIENT PROFILE ADULT - FALL HARM RISK - HARM RISK INTERVENTIONS

## 2022-04-25 NOTE — H&P ADULT - PROBLEM SELECTOR PLAN 8
DVT ppx: Xarelto 15 mg daily Pt on home olopatadine drops for allergic conjunctivitis, hold while inpatient

## 2022-04-25 NOTE — H&P ADULT - ATTENDING COMMENTS
i 89 female  PMH Afib on Xarelto, CHF, HTN, GERD, osteoarthritis, anxiety, iron deficiency anemia presents to ER with daughter c/o chest pain, left sided, radiating to left arm, shortness of breath, worse with exertion for the past few days and generalized weakness.   Pt seen, examined, case & care plan d/w pt, residents at detail.  D/W Family at detail.  D/W Cardiologist -DR Alegre at detail.  PO diet  AM labs   D/W DR Alegre -Plan for transfer to [facility] for TAVR eval when bed available.  Palliative care eval. 89 female  PMH Afib on Xarelto, CHF, HTN, GERD, osteoarthritis, anxiety, iron deficiency anemia presents to ER with daughter c/o chest pain, left sided, radiating to left arm, shortness of breath, worse with exertion for the past few days and generalized weakness.   Pt seen, examined, case & care plan d/w pt, residents at detail.  D/W Family at detail.  D/W Cardiologist -DR Alegre at detail.NO IVF , Lasix every other day.  PO diet  AM labs   D/W DR Alegre -Plan for transfer to [facility] for TAVR eval when bed available.  Palliative care eval.

## 2022-04-25 NOTE — CONSULT NOTE ADULT - ASSESSMENT
DOCUMENTATION IN PROGRESS   89 year old female with PMH afib on Xarelto, CHF, HTN, GERD, osteoarthritis, anxiety, iron deficiency anemia presents with decompensated heart failure    Severe AS,/ CHF/ HTN   - Patient p/w   - Echo with moderate to severe AS, follow up outpatient with Dr Lopez for possible TAVR eval as outpatient   - Strict intake and output    AFib  - Continue Cardizem po 120 qd  - Continue Digoxin   - Continue Toprol 100 mg   - Continue Xarelto   89 female  PMH Afib on Xarelto, CHF, HTN, GERD, osteoarthritis, anxiety, iron deficiency anemia presents to ER with daughter c/o chest pain, left sided, radiating to left arm, shortness of breath, worse with exertion for the past few days and generalized weakness,     A fib, sever AS, HTN, HLD   - Patient did have some YOUNG in past, now its worse in past few days which limited her ability to ambulate.  - She also has h/o chest tightness, pain this time is more pressure pain, which comes and goes. Had achy pain in both arms today   - Patient takes Lasix 40 mg every other day due to hypotension  - Echocardiogram 03/2022 demonstrates an NIELS of 0.9 sqcm, a DVI of 0.27, peak and mean gradients of 61 and 29 mmHg, and an AoV of 3.9 m/s; as per our review with Dr Aye Mckeon, Director of Structural Heart Echocardiography, these findings are consistent with moderate to severe AS., mild MR, EF= 65-70%, mod conc LVH, mod TR, borderline pulm HTN  - Serum Pro-Brain Natriuretic Peptide: 9188 pg/mL (04-25-22 @ 14:34)  - CXR clear   - S/p Lasix 20 mg IVP x 1  - Would increase Lasix to 40 mg PO daily. Assess volume status daily  - Strict intake and output    - Troponin: 86.9, elevated likely to reduced renal clearance from BRADY  - Trend troponin  - EKG showed A fib   - No signs of acute ischemia      - A fib rate in 70-90s on tele, would continue monitoring   - Continue Cardizem po 120 qd, Digoxin, Toprol 100 mg   - Continue Xarelto  - Would like to keep HR close to 60s given severe AS     - Trend creatinine. Patient baseline in 1, now 2.0    - Monitor and replete lytes, keep K>4, Mg>2.  - Will continue to follow.    Christiano Love, MS FNP, [facility]  Nurse Practitioner- Cardiology   Spectra #[phone]   89 female  PMH Afib on Xarelto, CHF, HTN, GERD, osteoarthritis, anxiety, iron deficiency anemia presents to ER with daughter c/o chest pain, left sided, radiating to left arm, shortness of breath, worse with exertion for the past few days and generalized weakness,     A fib, sever AS, HTN, HLD   - Patient did have some YOUNG in past, now its worse in past few days which limited her ability to ambulate.  - She also has h/o chest tightness, pain this time is more pressure pain, which comes and goes. Had achy pain in both arms today   - Patient takes Lasix 40 mg every other day due to hypotension  - Echocardiogram 03/2022 demonstrates an NIELS of 0.9 sqcm, a DVI of 0.27, peak and mean gradients of 61 and 29 mmHg, and an AoV of 3.9 m/s; as per our review with Dr Aye Mckeon, Director of Structural Heart Echocardiography, these findings are consistent with moderate to severe AS., mild MR, EF= 65-70%, mod conc LVH, mod TR, borderline pulm HTN  - Serum Pro-Brain Natriuretic Peptide: 9188 pg/mL (04-25-22 @ 14:34)  - CXR clear   - S/p Lasix 20 mg IVP x 1  - Continue 40 mg PO every other day. Assess volume status daily  - Would contact Dr Haynes for opinion on TAVR  - Strict intake and output    - Troponin: 86.9, elevated likely to reduced renal clearance from BRADY  - Trend troponin  - EKG showed A fib   - No signs of acute ischemia      - A fib rate in 70-90s on tele, would continue monitoring   - Continue Cardizem po 120 qd, Digoxin, Toprol 100 mg   - Continue Xarelto  - Would like to keep HR close to 60s given severe AS     - Trend creatinine. Patient baseline in 1, now 2.0    - Monitor and replete lytes, keep K>4, Mg>2.  - Will continue to follow.    Christiano Love, MS FNP, [facility]  Nurse Practitioner- Cardiology   Spectra #[phone]

## 2022-04-26 ENCOUNTER — TRANSCRIPTION ENCOUNTER (OUTPATIENT)
Age: 87
End: 2022-04-26

## 2022-04-26 DIAGNOSIS — R07.89 OTHER CHEST PAIN: ICD-10-CM

## 2022-04-26 LAB
ALBUMIN SERPL ELPH-MCNC: 3.5 G/DL — SIGNIFICANT CHANGE UP (ref 3.3–5)
ALP SERPL-CCNC: 76 U/L — SIGNIFICANT CHANGE UP (ref 40–120)
ALT FLD-CCNC: 15 U/L — SIGNIFICANT CHANGE UP (ref 12–78)
ANION GAP SERPL CALC-SCNC: 11 MMOL/L — SIGNIFICANT CHANGE UP (ref 5–17)
AST SERPL-CCNC: 21 U/L — SIGNIFICANT CHANGE UP (ref 15–37)
BILIRUB SERPL-MCNC: 0.7 MG/DL — SIGNIFICANT CHANGE UP (ref 0.2–1.2)
BUN SERPL-MCNC: 54 MG/DL — HIGH (ref 7–23)
CALCIUM SERPL-MCNC: 9.1 MG/DL — SIGNIFICANT CHANGE UP (ref 8.5–10.1)
CHLORIDE SERPL-SCNC: 104 MMOL/L — SIGNIFICANT CHANGE UP (ref 96–108)
CO2 SERPL-SCNC: 24 MMOL/L — SIGNIFICANT CHANGE UP (ref 22–31)
CREAT SERPL-MCNC: 1.8 MG/DL — HIGH (ref 0.5–1.3)
EGFR: 25 ML/MIN/1.73M2 — LOW
GLUCOSE SERPL-MCNC: 103 MG/DL — HIGH (ref 70–99)
HCT VFR BLD CALC: 39 % — SIGNIFICANT CHANGE UP (ref 34.5–45)
HGB BLD-MCNC: 13.3 G/DL — SIGNIFICANT CHANGE UP (ref 11.5–15.5)
MCHC RBC-ENTMCNC: 29.2 PG — SIGNIFICANT CHANGE UP (ref 27–34)
MCHC RBC-ENTMCNC: 34.1 GM/DL — SIGNIFICANT CHANGE UP (ref 32–36)
MCV RBC AUTO: 85.7 FL — SIGNIFICANT CHANGE UP (ref 80–100)
NRBC # BLD: 0 /100 WBCS — SIGNIFICANT CHANGE UP (ref 0–0)
PLATELET # BLD AUTO: 231 K/UL — SIGNIFICANT CHANGE UP (ref 150–400)
POTASSIUM SERPL-MCNC: 3.5 MMOL/L — SIGNIFICANT CHANGE UP (ref 3.5–5.3)
POTASSIUM SERPL-SCNC: 3.5 MMOL/L — SIGNIFICANT CHANGE UP (ref 3.5–5.3)
PROT SERPL-MCNC: 7.1 G/DL — SIGNIFICANT CHANGE UP (ref 6–8.3)
RBC # BLD: 4.55 M/UL — SIGNIFICANT CHANGE UP (ref 3.8–5.2)
RBC # FLD: 14.1 % — SIGNIFICANT CHANGE UP (ref 10.3–14.5)
SODIUM SERPL-SCNC: 139 MMOL/L — SIGNIFICANT CHANGE UP (ref 135–145)
TROPONIN I, HIGH SENSITIVITY RESULT: 108.5 NG/L — HIGH
TROPONIN I, HIGH SENSITIVITY RESULT: 124.2 NG/L — HIGH
WBC # BLD: 6.33 K/UL — SIGNIFICANT CHANGE UP (ref 3.8–10.5)
WBC # FLD AUTO: 6.33 K/UL — SIGNIFICANT CHANGE UP (ref 3.8–10.5)

## 2022-04-26 PROCEDURE — 99232 SBSQ HOSP IP/OBS MODERATE 35: CPT

## 2022-04-26 PROCEDURE — 99497 ADVNCD CARE PLAN 30 MIN: CPT

## 2022-04-26 RX ORDER — SPIRONOLACTONE 25 MG/1
1 TABLET, FILM COATED ORAL
Qty: 0 | Refills: 0 | DISCHARGE

## 2022-04-26 RX ORDER — FAMOTIDINE 10 MG/ML
20 INJECTION INTRAVENOUS DAILY
Refills: 0 | Status: DISCONTINUED | OUTPATIENT
Start: 2022-04-27 | End: 2022-04-30

## 2022-04-26 RX ORDER — FAMOTIDINE 10 MG/ML
1 INJECTION INTRAVENOUS
Qty: 0 | Refills: 0 | DISCHARGE
Start: 2022-04-26

## 2022-04-26 RX ORDER — FAMOTIDINE 10 MG/ML
20 INJECTION INTRAVENOUS ONCE
Refills: 0 | Status: COMPLETED | OUTPATIENT
Start: 2022-04-26 | End: 2022-04-26

## 2022-04-26 RX ADMIN — FAMOTIDINE 20 MILLIGRAM(S): 10 INJECTION INTRAVENOUS at 15:36

## 2022-04-26 RX ADMIN — ZOLPIDEM TARTRATE 5 MILLIGRAM(S): 10 TABLET ORAL at 21:30

## 2022-04-26 RX ADMIN — Medication 100 MILLIGRAM(S): at 05:31

## 2022-04-26 RX ADMIN — Medication 120 MILLIGRAM(S): at 05:31

## 2022-04-26 RX ADMIN — Medication 125 MICROGRAM(S): at 05:31

## 2022-04-26 RX ADMIN — PANTOPRAZOLE SODIUM 40 MILLIGRAM(S): 20 TABLET, DELAYED RELEASE ORAL at 11:23

## 2022-04-26 RX ADMIN — RIVAROXABAN 15 MILLIGRAM(S): KIT at 17:15

## 2022-04-26 NOTE — PROGRESS NOTE ADULT - ASSESSMENT
89 y/o female with PMHx of Afib on Xarelto, CHF, HTN, GERD, osteoarthritis, anxiety, iron deficiency anemia who presents with 3 days of worsening dyspnea on exertion along with chest discomfort, likely secondary to symptomatic aortic stenosis.

## 2022-04-26 NOTE — PROGRESS NOTE ADULT - SUBJECTIVE AND OBJECTIVE BOX
Patient is a 89y old  Female who presents with a chief complaint of Shortness of breath (26 Apr 2022 07:34)    HPI:  This is a 89 y/o female with PMHx of Afib on Xarelto, CHF, HTN, GERD, osteoarthritis, anxiety, iron deficiency anemia who presents with 3 days of worsening dyspnea on exertion along with chest discomfort. Hx was obtained from pt's daughter, as hx was limited due to patient's dementia. At baseline, pt lives alone with help from an aide, but is able to perform most of her ADLs and cook on her own without difficulty. However, pt has had increasing difficulty with ADLs due to her shortness of breath. Pt also has midline back pain radiating to both arms and some chest discomfort that occurs when the patient is short of breath. She has not had any fevers, chills, lightheadedness, palpitations, abd pain, constipation/diarrhea, dysuria, or pain/swelling in the legs. Pt has no other complaints or concerns at this time, and only current symptom is L-sided back/arm pain.    ED course:  T 95.9, , /72, RR 22, SpO2 100% on 2L NC  Labs significant for BUN/Cr 53/2.00, BNP 9188, TropI 86.9  CXR negative for acute pathology  EKG AFib, rate 86, LAFB, nonspecific ST/T wave abnormality  Given Lasix 20 mg IVP, Cardizem 10 mg IVP in ED   (25 Apr 2022 18:07)    INTERVAL HPI:  Patient was seen and examined at bedside. States she is feeling well and her breathing has improved. Denies any chest pain or palpitations. Transfer planning to [facility].     OVERNIGHT EVENTS:  No acute events overnight,     Home Medications:  Ambien 10 mg oral tablet: 1 tab(s) orally once a day (at bedtime) (25 Apr 2022 20:01)  Lasix 40 mg oral tablet: 1 tab(s) orally every other day (25 Apr 2022 20:01)  memantine 28 mg oral capsule, extended release: 1 cap(s) orally once a day (25 Apr 2022 20:01)  olopatadine 0.2% ophthalmic solution: 1 drop(s) to each affected eye once a day, As Needed (25 Apr 2022 20:01)  pantoprazole 20 mg oral delayed release tablet: 1 tab(s) orally once a day (25 Apr 2022 20:01)  rivaroxaban 15 mg oral tablet: 1 tab(s) orally once a day (25 Apr 2022 20:01)  spironolactone 50 mg oral tablet: 1 tab(s) orally once a day (25 Apr 2022 20:01)  Toprol- mg oral tablet, extended release: 1 tab(s) orally once a day (25 Apr 2022 20:01)  Vitamin D3 1250 mcg (50,000 intl units) oral capsule: 1 cap(s) orally once a week on Monday  (25 Apr 2022 20:01)      MEDICATIONS  (STANDING):  digoxin     Tablet 125 MICROGram(s) Oral <User Schedule>  diltiazem    milliGRAM(s) Oral daily  memantine ER 28 milliGRAM(s) Oral daily  metoprolol succinate  milliGRAM(s) Oral daily  pantoprazole  Injectable 40 milliGRAM(s) IV Push daily  rivaroxaban 15 milliGRAM(s) Oral with dinner    MEDICATIONS  (PRN):  zolpidem 5 milliGRAM(s) Oral at bedtime PRN Insomnia      Allergies    No Known Allergies    Intolerances        Social History:  Lives at home alone, ambulates with walker, has aide to help with ADLs/IADLs  Denies alcohol, tobacco, recreational drug use  Vaccinated for COVID x 3 (Moderna) (25 Apr 2022 18:07)      REVIEW OF SYSTEMS:  CONSTITUTIONAL: No fever, No chills, No fatigue, No myalgia, No Body ache, No Weakness  EYES: No eye pain,  No visual disturbances, No discharge, NO Redness  ENMT:  No ear pain, No nose bleed, No vertigo; No sinus pain, NO throat pain, No Congestion  NECK: No pain, No stiffness  RESPIRATORY: No cough, NO wheezing, No  hemoptysis, NO  shortness of breath  CARDIOVASCULAR: No chest pain, palpitations  GASTROINTESTINAL: No abdominal pain, NO epigastric pain. No nausea, No vomiting; No diarrhea, No constipation. [x  ] No BM  GENITOURINARY: No dysuria, No frequency, No urgency, No hematuria, NO incontinence  NEUROLOGICAL: No headaches, No dizziness, No numbness, No tingling, No tremors, No weakness  EXT: No Swelling, No Pain, No Edema  SKIN:  [ x ] No itching, burning, rashes, or lesions   MUSCULOSKELETAL: No joint pain ,No Jt swelling; No muscle pain, No back pain, No extremity pain  PSYCHIATRIC: No depression,  No anxiety,  No mood swings ,No difficulty sleeping at night  PAIN SCALE: [ x ] None  [  ] Other-  ROS Unable to obtain due to - [  ] Dementia  [  ] Lethargy [  ] Drowsy [  ] Sedated [  ] non verbal  REST OF REVIEW Of SYSTEM - [ x ] Normal     Vital Signs Last 24 Hrs  T(C): 36.8 (26 Apr 2022 04:29), Max: 36.8 (26 Apr 2022 04:29)  T(F): 98.2 (26 Apr 2022 04:29), Max: 98.2 (26 Apr 2022 04:29)  HR: 77 (26 Apr 2022 05:32) (69 - 102)  BP: 114/76 (26 Apr 2022 05:32) (100/65 - 131/78)  BP(mean): --  RR: 18 (26 Apr 2022 04:29) (18 - 22)  SpO2: 98% (26 Apr 2022 04:29) (98% - 100%)  Finger Stick          PHYSICAL EXAM: i am good, on 2L NC  GENERAL:  [ x ] NAD , [x  ] well appearing, [  ] Agitated, [  ] Drowsy,  [  ] Lethargy, [  ] confused   HEAD:  [ x ] Normal, [  ] Other  EYES:  [ x ] EOMI, [ x ] PERRLA, [x  ] conjunctiva and sclera clear normal, [  ] Other,  [  ] Pallor,[  ] Discharge  ENMT:  [x  ] Normal, [ x ] Moist mucous membranes, [  ] Good dentition, [  ] No Thrush  NECK:  [x  ] Supple, [x  ] No JVD, [x  ] Normal thyroid, [  ] Lymphadenopathy [  ] Other  CHEST/LUNG:  [ x ] Clear to auscultation bilaterally, [  ] Breath Sounds equal B/L / Decrease, [  ] poor effort  [x  ] No rales, [ x ] No rhonchi  [x  ]  No wheezing,   HEART:  [ x ] Regular rate and rhythm, [  ] tachycardia, [  ] Bradycardia,  [  ] irregular  [ x ] No murmurs, No rubs, No gallops, [  ] PPM in place (Mfr:  )  ABDOMEN:  [x  ] Soft, [x  ] Nontender, [  x] Nondistended, [ x ] No mass, [ x ] Bowel sounds present, [  ] obese  NERVOUS SYSTEM:  [x  ] Alert & Oriented X3, [  ] Nonfocal  [  ] Confusion  [  ] Encephalopathic [  ] Sedated [  ] Unable to assess, [  ] Dementia [  ] Other-  EXTREMITIES: [x  ] 2+ Peripheral Pulses, No clubbing, No cyanosis,  [  ] edema B/L lower EXT. [  ] PVD stasis skin changes B/L Lower EXT, [  ] wound  LYMPH: No lymphadenopathy noted  SKIN:  [x  ] No rashes or lesions, [  ] Pressure Ulcers, [  ] ecchymosis, [  ] Skin Tears, [  ] Other    DIET: Diet, DASH/TLC:   Sodium & Cholesterol Restricted  1000mL Fluid Restriction ([id number]) (04-25-22 @ 17:44)      LABS:                        13.3   6.33  )-----------( 231      ( 26 Apr 2022 07:56 )             39.0     26 Apr 2022 07:56    139    |  104    |  54     ----------------------------<  103    3.5     |  24     |  1.80     Ca    9.1        26 Apr 2022 07:56  Mg     3.2       25 Apr 2022 14:34    TPro  7.1    /  Alb  3.5    /  TBili  0.7    /  DBili  x      /  AST  21     /  ALT  15     /  AlkPhos  76     26 Apr 2022 07:56    PT/INR - ( 25 Apr 2022 14:34 )   PT: 13.2 sec;   INR: 1.13 ratio         PTT - ( 25 Apr 2022 14:34 )  PTT:33.1 sec              CARDIAC MARKERS ( 25 Apr 2022 14:34 )  x     / x     / x     / x     / 3.0 ng/mL             Anemia Panel:      Thyroid Panel:            Serum Pro-Brain Natriuretic Peptide: 9188 pg/mL (04-25-22 @ 14:34)      RADIOLOGY & ADDITIONAL TESTS:      HEALTH ISSUES - PROBLEM Dx:  Chronic atrial fibrillation    HTN (hypertension)    Dementia    GERD (gastroesophageal reflux disease)    Aortic stenosis    BRADY (acute kidney injury)    Allergic conjunctivitis, bilateral    Need for prophylactic measure            Consultant(s) Notes Reviewed:  [ x ] YES     Care Discussed with [X] Consultants  [ x ] Patient  [ x ] Family [  ] HCP [  ]   [  ] Social Service  [  x] RN, [  ] Physical Therapy,[  ] Palliative care team  DVT PPX: [  ] Lovenox, [  ] S C Heparin, [  ] Coumadin, [  x] Xarelto, [  ] Eliquis, [  ] Pradaxa, [  ] IV Heparin drip, [  ] SCD [  ] Contraindication 2 to GI Bleed,[  ] Ambulation [  ] Contraindicated 2 to  bleed [  ] Contraindicated 2 to Brain Bleed  Advanced directive: [  ] None, [  ] DNR/DNI [x] Prior [facility]  Patient is a 89y old  Female who presents with a chief complaint of Shortness of breath (26 Apr 2022 07:34)    HPI:  This is a 89 y/o female with PMHx of Afib on Xarelto, CHF, HTN, GERD, osteoarthritis, anxiety, iron deficiency anemia who presents with 3 days of worsening dyspnea on exertion along with chest discomfort. Hx was obtained from pt's daughter, as hx was limited due to patient's dementia. At baseline, pt lives alone with help from an aide, but is able to perform most of her ADLs and cook on her own without difficulty. However, pt has had increasing difficulty with ADLs due to her shortness of breath. Pt also has midline back pain radiating to both arms and some chest discomfort that occurs when the patient is short of breath. She has not had any fevers, chills, lightheadedness, palpitations, abd pain, constipation/diarrhea, dysuria, or pain/swelling in the legs. Pt has no other complaints or concerns at this time, and only current symptom is L-sided back/arm pain.    ED course:  T 95.9, , /72, RR 22, SpO2 100% on 2L NC  Labs significant for BUN/Cr 53/2.00, BNP 9188, TropI 86.9  CXR negative for acute pathology  EKG AFib, rate 86, LAFB, nonspecific ST/T wave abnormality  Given Lasix 20 mg IVP, Cardizem 10 mg IVP in ED   (25 Apr 2022 18:07)    INTERVAL HPI:  Patient was seen and examined at bedside. States she is feeling well and her breathing has improved. Denies any chest pain or palpitations. Transfer planning to [facility].     OVERNIGHT EVENTS:  No acute events overnight,     Home Medications:  Ambien 10 mg oral tablet: 1 tab(s) orally once a day (at bedtime) (25 Apr 2022 20:01)  Lasix 40 mg oral tablet: 1 tab(s) orally every other day (25 Apr 2022 20:01)  memantine 28 mg oral capsule, extended release: 1 cap(s) orally once a day (25 Apr 2022 20:01)  olopatadine 0.2% ophthalmic solution: 1 drop(s) to each affected eye once a day, As Needed (25 Apr 2022 20:01)  pantoprazole 20 mg oral delayed release tablet: 1 tab(s) orally once a day (25 Apr 2022 20:01)  rivaroxaban 15 mg oral tablet: 1 tab(s) orally once a day (25 Apr 2022 20:01)  spironolactone 50 mg oral tablet: 1 tab(s) orally once a day (25 Apr 2022 20:01)  Toprol- mg oral tablet, extended release: 1 tab(s) orally once a day (25 Apr 2022 20:01)  Vitamin D3 1250 mcg (50,000 intl units) oral capsule: 1 cap(s) orally once a week on Monday  (25 Apr 2022 20:01)      MEDICATIONS  (STANDING):  digoxin     Tablet 125 MICROGram(s) Oral <User Schedule>  diltiazem    milliGRAM(s) Oral daily  memantine ER 28 milliGRAM(s) Oral daily  metoprolol succinate  milliGRAM(s) Oral daily  pantoprazole  Injectable 40 milliGRAM(s) IV Push daily  rivaroxaban 15 milliGRAM(s) Oral with dinner    MEDICATIONS  (PRN):  zolpidem 5 milliGRAM(s) Oral at bedtime PRN Insomnia      Allergies    No Known Allergies    Intolerances        Social History:  Lives at home alone, ambulates with walker, has aide to help with ADLs/IADLs  Denies alcohol, tobacco, recreational drug use  Vaccinated for COVID x 3 (Moderna) (25 Apr 2022 18:07)      REVIEW OF SYSTEMS:  CONSTITUTIONAL: No fever, No chills, No fatigue, No myalgia, No Body ache, No Weakness  EYES: No eye pain,  No visual disturbances, No discharge, NO Redness  ENMT:  No ear pain, No nose bleed, No vertigo; No sinus pain, NO throat pain, No Congestion  NECK: No pain, No stiffness  RESPIRATORY: No cough, NO wheezing, No  hemoptysis, NO  shortness of breath  CARDIOVASCULAR: No chest pain, palpitations  GASTROINTESTINAL: No abdominal pain, NO epigastric pain. No nausea, No vomiting; No diarrhea, No constipation. [x  ] No BM  GENITOURINARY: No dysuria, No frequency, No urgency, No hematuria, NO incontinence  NEUROLOGICAL: No headaches, No dizziness, No numbness, No tingling, No tremors, No weakness  EXT: No Swelling, No Pain, No Edema  SKIN:  [ x ] No itching, burning, rashes, or lesions   MUSCULOSKELETAL: No joint pain ,No Jt swelling; No muscle pain, No back pain, No extremity pain  PSYCHIATRIC: No depression,  No anxiety,  No mood swings ,No difficulty sleeping at night  PAIN SCALE: [ x ] None  [  ] Other-  ROS Unable to obtain due to - [  ] Dementia  [  ] Lethargy [  ] Drowsy [  ] Sedated [  ] non verbal  REST OF REVIEW Of SYSTEM - [ x ] Normal     Vital Signs Last 24 Hrs  T(C): 36.8 (26 Apr 2022 04:29), Max: 36.8 (26 Apr 2022 04:29)  T(F): 98.2 (26 Apr 2022 04:29), Max: 98.2 (26 Apr 2022 04:29)  HR: 77 (26 Apr 2022 05:32) (69 - 102)  BP: 114/76 (26 Apr 2022 05:32) (100/65 - 131/78)  BP(mean): --  RR: 18 (26 Apr 2022 04:29) (18 - 22)  SpO2: 98% (26 Apr 2022 04:29) (98% - 100%)  Finger Stick          PHYSICAL EXAM: i am good, on 2L NC  GENERAL:  [ x ] NAD , [x  ] well appearing, [  ] Agitated, [  ] Drowsy,  [  ] Lethargy, [  ] confused   HEAD:  [ x ] Normal, [  ] Other  EYES:  [ x ] EOMI, [ x ] PERRLA, [x  ] conjunctiva and sclera clear normal, [  ] Other,  [  ] Pallor,[  ] Discharge  ENMT:  [x  ] Normal, [ x ] Moist mucous membranes, [  ] Good dentition, [  ] No Thrush  NECK:  [x  ] Supple, [x  ] No JVD, [x  ] Normal thyroid, [  ] Lymphadenopathy [  ] Other  CHEST/LUNG:  [ x ] Clear to auscultation bilaterally, [  ] Breath Sounds equal B/L / Decrease, [  ] poor effort  [x  ] No rales, [ x ] No rhonchi  [x  ]  No wheezing,   HEART:  [ x ] Regular rate and rhythm, [  ] tachycardia, [  ] Bradycardia,  [  ] irregular  [ x ] No murmurs, No rubs, No gallops, [  ] PPM in place (Mfr:  )  ABDOMEN:  [x  ] Soft, [x  ] Nontender, [  x] Nondistended, [ x ] No mass, [ x ] Bowel sounds present, [  ] obese  NERVOUS SYSTEM:  [x  ] Alert & Oriented X3, [  ] Nonfocal  [  ] Confusion  [  ] Encephalopathic [  ] Sedated [  ] Unable to assess, [  ] Dementia [  ] Other-  EXTREMITIES: [x  ] 2+ Peripheral Pulses, No clubbing, No cyanosis,  [  ] edema B/L lower EXT. [  ] PVD stasis skin changes B/L Lower EXT, [  ] wound  LYMPH: No lymphadenopathy noted  SKIN:  [x  ] No rashes or lesions, [  ] Pressure Ulcers, [  ] ecchymosis, [  ] Skin Tears, [  ] Other    DIET: Diet, DASH/TLC:   Sodium & Cholesterol Restricted  1000mL Fluid Restriction ([id number]) (04-25-22 @ 17:44)      LABS:                        13.3   6.33  )-----------( 231      ( 26 Apr 2022 07:56 )             39.0     26 Apr 2022 07:56    139    |  104    |  54     ----------------------------<  103    3.5     |  24     |  1.80     Ca    9.1        26 Apr 2022 07:56  Mg     3.2       25 Apr 2022 14:34    TPro  7.1    /  Alb  3.5    /  TBili  0.7    /  DBili  x      /  AST  21     /  ALT  15     /  AlkPhos  76     26 Apr 2022 07:56    PT/INR - ( 25 Apr 2022 14:34 )   PT: 13.2 sec;   INR: 1.13 ratio         PTT - ( 25 Apr 2022 14:34 )  PTT:33.1 sec      CARDIAC MARKERS ( 25 Apr 2022 14:34 )  x     / x     / x     / x     / 3.0 ng/mL        Serum Pro-Brain Natriuretic Peptide: 9188 pg/mL (04-25-22 @ 14:34)      RADIOLOGY & ADDITIONAL TESTS: none      HEALTH ISSUES - PROBLEM Dx:  Chronic atrial fibrillation    HTN (hypertension)    Dementia    GERD (gastroesophageal reflux disease)    Aortic stenosis    BRADY (acute kidney injury)    Allergic conjunctivitis, bilateral    Need for prophylactic measure            Consultant(s) Notes Reviewed:  [ x ] YES     Care Discussed with [X] Consultants  [ x ] Patient  [ x ] Family- dtr  ] HCP [  ]   [  ] Social Service  [  x] RN, [  ] Physical Therapy,[  ] Palliative care team  DVT PPX: [  ] Lovenox, [  ] S C Heparin, [  ] Coumadin, [  x] Xarelto, [  ] Eliquis, [  ] Pradaxa, [  ] IV Heparin drip, [  ] SCD [  ] Contraindication 2 to GI Bleed,[  ] Ambulation [  ] Contraindicated 2 to  bleed [  ] Contraindicated 2 to Brain Bleed  Advanced directive: [  ] None, [  ] DNR/DNI [x] [facility]

## 2022-04-26 NOTE — PROGRESS NOTE ADULT - PROBLEM SELECTOR PLAN 5
Continue home memantine 28 mg daily- family to bring from home Continue Toprol  mg, Cardizem 120 mg daily, Lasix 40 mg QOD with hold parameters  - Hold spironolactone 50 mg daily for now to avoid overdiuresis, can resume in AM if needed  - Monitor routine hemodynamics

## 2022-04-26 NOTE — PROGRESS NOTE ADULT - PROBLEM SELECTOR PLAN 2
Cr on admission 2.0, baseline appears to be around 1.0  - Likely secondary to decreased effective circulating volume due to aortic stenosis vs overdiuresis  - Trend renal indices  - Avoid nephrotoxic agents and IV contrast agents if possible  - Received Lasix 20 mg IVP in ED  - Decrease Lasix to 40 mg every other day, and closely monitor daily creatinine  - Avoid IVF at this time due to AS Pt presenting with increased dyspnea on exertion, chest discomfort for 3 days  - Likely 2/2 symptomatic aortic stenosis  - Prior echo from March 2022 showing LVEF of 65-70%, moderate to severe aortic stenosis  - Cardiology Dr. Alegre consulted in ED, pt accepted for transfer to [facility] for structural heart evaluation (accepting physician Dr. aHynes)  - As per cardio, pt is poor candidate for TAVR but may benefit from repeat structural heart evaluation and possible BAV  - Decrease Lasix to 40 mg every other day  - Strict Is/Os, daily weights, fluid restriction to 1 L daily Pt presenting with increased dyspnea on exertion, chest discomfort for 3 days  - Likely 2/2 symptomatic aortic stenosis  - Prior echo from March 2022 showing LVEF of 65-70%, moderate to severe aortic stenosis  - Cardiology Dr. Alegre consulted in ED, pt accepted for transfer to [facility] for structural heart evaluation (accepting physician Dr. Haynes)  - As per cardio, pt is poor candidate for TAVR but may benefit from repeat structural heart evaluation and possible BAV  - Decrease Lasix to 40 mg every other day-HOLD as d/w DR MARIELY Mukherjee.  - Strict Is/Os, daily weights, fluid restriction to 1 L daily.  on Toprol  mg daily

## 2022-04-26 NOTE — DISCHARGE NOTE PROVIDER - CARE PROVIDER_API CALL
Lon Chirinos  CARDIOVASCULAR DISEASE  [address] [place] [address]  Phone: [phone]  Fax: [phone]  Follow Up Time: 1-3 days   Lon Chirinos  CARDIOVASCULAR DISEASE  [address] [place] [address]  Phone: [phone]  Fax: [phone]  Follow Up Time: 1-3 days    Giacomo Lopez)  Cardiovascular Disease  [address] [place] [address]  Phone: [phone]  Fax: [phone]  Follow Up Time:

## 2022-04-26 NOTE — DISCHARGE NOTE PROVIDER - CARE PROVIDERS DIRECT ADDRESSES
[email or web address]

## 2022-04-26 NOTE — PROGRESS NOTE ADULT - ATTENDING COMMENTS
89 female  PMH Afib on Xarelto, CHF, HTN, GERD, osteoarthritis, anxiety, iron deficiency anemia presents to ER with daughter c/o chest pain, left sided, radiating to left arm, shortness of breath, worse with exertion for the past few days and generalized weakness.   Pt seen, examined, case & care plan d/w pt, residents at detail.  D/W Dtr  at detail.  D/W Cardiologist -DR Mukherjee at detail. HOLD AM dose of Lasix as d/w DR MARIELY Mukherjee   - NO IVF ,   PO diet  AM labs   D/W DR Mukherjee-Plan for transfer to [facility] for TAVR eval when bed available.  Palliative care eval.   Total care time is 45 minutes.

## 2022-04-26 NOTE — DISCHARGE NOTE PROVIDER - NSDCACTIVITY_GEN_ALL_CORE
WDL No heavy lifting/straining Bathing allowed/Do not drive or operate machinery/Walking - Indoors allowed/No heavy lifting/straining

## 2022-04-26 NOTE — DISCHARGE NOTE PROVIDER - NSDCMRMEDTOKEN_GEN_ALL_CORE_FT
Ambien 10 mg oral tablet: 1 tab(s) orally once a day (at bedtime)  digoxin 125 mcg (0.125 mg) oral tablet: 1 tab(s) orally every other day  dilTIAZem 120 mg/24 hours oral capsule, extended release: 1 cap(s) orally once a day  Lasix 40 mg oral tablet: 1 tab(s) orally every other day  memantine 28 mg oral capsule, extended release: 1 cap(s) orally once a day  olopatadine 0.2% ophthalmic solution: 1 drop(s) to each affected eye once a day, As Needed  pantoprazole 20 mg oral delayed release tablet: 1 tab(s) orally once a day  rivaroxaban 15 mg oral tablet: 1 tab(s) orally once a day  spironolactone 50 mg oral tablet: 1 tab(s) orally once a day  Toprol- mg oral tablet, extended release: 1 tab(s) orally once a day  Vitamin D3 1250 mcg (50,000 intl units) oral capsule: 1 cap(s) orally once a week on Monday    Ambien 10 mg oral tablet: 1 tab(s) orally once a day (at bedtime)  digoxin 125 mcg (0.125 mg) oral tablet: 1 tab(s) orally every other day  dilTIAZem 120 mg/24 hours oral capsule, extended release: 1 cap(s) orally once a day  famotidine 20 mg oral tablet: 1 tab(s) orally once a day  Lasix 40 mg oral tablet: 1 tab(s) orally every other day  memantine 28 mg oral capsule, extended release: 1 cap(s) orally once a day  olopatadine 0.2% ophthalmic solution: 1 drop(s) to each affected eye once a day, As Needed  pantoprazole 20 mg oral delayed release tablet: 1 tab(s) orally once a day  rivaroxaban 15 mg oral tablet: 1 tab(s) orally once a day  Toprol- mg oral tablet, extended release: 1 tab(s) orally once a day  Vitamin D3 1250 mcg (50,000 intl units) oral capsule: 1 cap(s) orally once a week on Monday    Ambien 10 mg oral tablet: 1 tab(s) orally once a day (at bedtime)  digoxin 125 mcg (0.125 mg) oral tablet: 1 tab(s) orally every other day  dilTIAZem 120 mg/24 hours oral capsule, extended release: 1 cap(s) orally once a day  famotidine 20 mg oral tablet: 1 tab(s) orally once a day  Lasix 40 mg oral tablet: 1 tab(s) orally every other day  memantine 28 mg oral capsule, extended release: 1 cap(s) orally once a day  olopatadine 0.2% ophthalmic solution: 1 drop(s) to each affected eye once a day, As Needed  pantoprazole 20 mg oral delayed release tablet: 1 tab(s) orally once a day  rivaroxaban 15 mg oral tablet: 1 tab(s) orally once a day  senna oral tablet: 2 tab(s) orally once a day (at bedtime)  Toprol- mg oral tablet, extended release: 1 tab(s) orally once a day  Vitamin D3 1250 mcg (50,000 intl units) oral capsule: 1 cap(s) orally once a week on Monday    Ambien 10 mg oral tablet: 1 tab(s) orally once a day (at bedtime)  digoxin 125 mcg (0.125 mg) oral tablet: 1 tab(s) orally every other day  dilTIAZem 120 mg/24 hours oral capsule, extended release: 1 cap(s) orally once a day  famotidine 20 mg oral tablet: 1 tab(s) orally once a day  Lasix 40 mg oral tablet: 1 tab(s) orally every other day  linaclotide 290 mcg oral capsule: 1 cap(s) orally once a day   memantine 28 mg oral capsule, extended release: 1 cap(s) orally once a day  olopatadine 0.2% ophthalmic solution: 1 drop(s) to each affected eye once a day, As Needed  pantoprazole 20 mg oral delayed release tablet: 1 tab(s) orally once a day  rivaroxaban 15 mg oral tablet: 1 tab(s) orally once a day  senna oral tablet: 2 tab(s) orally once a day (at bedtime)  Toprol- mg oral tablet, extended release: 1 tab(s) orally once a day  Vitamin D3 1250 mcg (50,000 intl units) oral capsule: 1 cap(s) orally once a week on Monday    Ambien 10 mg oral tablet: 1 tab(s) orally once a day (at bedtime)  digoxin 125 mcg (0.125 mg) oral tablet: 1 tab(s) orally every other day  dilTIAZem 120 mg/24 hours oral capsule, extended release: 1 cap(s) orally once a day  famotidine 20 mg oral tablet: 1 tab(s) orally once a day  Lasix 40 mg oral tablet: 1 tab(s) orally every other day  linaclotide 290 mcg oral capsule: 1 cap(s) orally once a day   memantine 28 mg oral capsule, extended release: 1 cap(s) orally once a day  olopatadine 0.2% ophthalmic solution: 1 drop(s) to each affected eye once a day, As Needed  pantoprazole 20 mg oral delayed release tablet: 1 tab(s) orally once a day  potassium chloride 20 mEq/15 mL oral liquid: 15 milliliter(s) orally every other day  with Lasix   rivaroxaban 15 mg oral tablet: 1 tab(s) orally once a day  senna oral tablet: 2 tab(s) orally once a day (at bedtime)  Toprol- mg oral tablet, extended release: 1 tab(s) orally once a day  Vitamin D3 1250 mcg (50,000 intl units) oral capsule: 1 cap(s) orally once a week on Monday

## 2022-04-26 NOTE — PROGRESS NOTE ADULT - PROBLEM SELECTOR PLAN 1
Pt presenting with increased dyspnea on exertion, chest discomfort for 3 days  - Likely 2/2 symptomatic aortic stenosis  - Prior echo from March 2022 showing LVEF of 65-70%, moderate to severe aortic stenosis  - Cardiology Dr. Alegre consulted in ED, pt accepted for transfer to [facility] for structural heart evaluation (accepting physician Dr. Haynes)  - As per cardio, pt is poor candidate for TAVR but may benefit from repeat structural heart evaluation and possible BAV  - Decrease Lasix to 40 mg every other day  - Strict Is/Os, daily weights, fluid restriction to 1 L daily Chest Pressure/ YOUNG likely 2/2 Severe AS.  + Troponin ,likely 2/2 Demand ischemia, also 2/2 BRADY with decrease clearance.  -Serial CPK/MB/Troponin -q 8 hrs   -pt had Recent ECHO March 2022  -Cardiology-Dr Solis follow up   Awaiting Transfer to [facility] for TAVR eval. Chest Pressure/ YOUNG likely 2/2 Severe AS.  + Troponin ,likely 2/2 Demand ischemia, also 2/2 BRADY with decrease clearance.  -Serial CPK/MB/Troponin -q 8 hrs   -pt had Recent ECHO March 2022  -Cardiology-Dr Solis follow up   Awaiting Transfer to [facility] for TAVR eval.  On Toprol  mg daily

## 2022-04-26 NOTE — PHYSICAL THERAPY INITIAL EVALUATION ADULT - ADDITIONAL COMMENTS
pt lives alone in an apt, in [place], no steps. Pt ambulates independently with RW and was mostly independent with ADLs. uses home O2.

## 2022-04-26 NOTE — GOALS OF CARE CONVERSATION - ADVANCED CARE PLANNING - CONVERSATION DETAILS
Writer met with pt and dtr/HCP Raquel at bedside. Reviewed patient's medical and social history as well as events leading to patient's hospitalization. Writer discussed patient's current diagnosis (dyspnea, aortic stenosis severe, A/F, CHF, HTN, gerd, iron def. anemia), medical condition and management, prognosis, and life expectancy. Inquired about patient's wishes regarding extent of medical care to be provided including escalation of medical care into the ICU and use of vasopressor support. In addition, the writer inquired about thoughts regarding cardiopulmonary resuscitation, artificial nutrition and hydration including use of feeding tubes and IVF, antibiotics, and further investigative studies such as blood draws and radiology .Both  showed good insight into medical condition .New MOLST completed (unable to locateold one).  All questions answered.  Patient consented to DNR. MOLST form filled out and placed in chart. Psychosocial support provided.

## 2022-04-26 NOTE — DIETITIAN INITIAL EVALUATION ADULT - PERTINENT MEDS FT
MEDICATIONS  (STANDING):  digoxin     Tablet 125 MICROGram(s) Oral <User Schedule>  diltiazem    milliGRAM(s) Oral daily  memantine ER 28 milliGRAM(s) Oral daily  metoprolol succinate  milliGRAM(s) Oral daily  pantoprazole  Injectable 40 milliGRAM(s) IV Push daily  rivaroxaban 15 milliGRAM(s) Oral with dinner    MEDICATIONS  (PRN):  zolpidem 5 milliGRAM(s) Oral at bedtime PRN Insomnia

## 2022-04-26 NOTE — PROGRESS NOTE ADULT - PROBLEM SELECTOR PLAN 3
Chronic, EKG showing rate-controlled AFib  - Continue digoxin 125 mcg every other day, Dig level 1.3 on admission  - Continue Toprol  mg daily, Cardizem 120 mg daily with hold parameters  - Continue Xarelto 15 mg daily BRADY/CKD 2- Cr on admission 2.0, baseline appears to be around 1.0- slowly improving   - Likely secondary to decreased effective circulating volume due to aortic stenosis vs overdiuresis  - Trend renal indices  - Avoid nephrotoxic agents and IV contrast agents if possible  - Received Lasix 20 mg IVP in ED  - Decrease Lasix to 40 mg every other day, and closely monitor daily creatinine  - Avoid IVF at this time due to AS

## 2022-04-26 NOTE — PHARMACOTHERAPY INTERVENTION NOTE - COMMENTS
CHF Patient Transitions of Care Counseling  Counseled by ([facility] name): Manisha Ding, PharmD  Person(s) counseled: patient and patient's daughter  Counseling materials provided/counseling aids used:  [email or web address] plan, Micromedex Carenotes  Time spent Counseling: 15 minutes  Counseling provided according to [place] guidelines including side effects CHF Patient Transitions of Care Counseling  Counseled by ([facility] name): Manisha Ding, PharmD  Person(s) counseled: patient and patient's daughter  Counseling materials provided/counseling aids used:  [email or web address] plan, Micromedex Carenotes  Time spent Counseling: 15 minutes  Counseling provided according to [place] guidelines including side effects  Notes: Discussed with patient's daughter that we do not carry the patient's home dose of Namenda and she would have to bring in medication from home. Per daughter, will not bring in the medication and is okay with holding while the patient is admitted.

## 2022-04-26 NOTE — PROGRESS NOTE ADULT - SUBJECTIVE AND OBJECTIVE BOX
[facility] -- Sage Monzon, Jessica Gray, Lonny Alegre, Isabella Handley: Office # [phone]    Follow Up:  chest pain, valvular disease     Subjective/Observations: Patient seen and examined. Patient awake, alert, resting in bed. Does not appear to be in any distress. Tolerating O2 via nasal cannula.     REVIEW OF SYSTEMS: All other review of systems are negative unless indicated above    PAST MEDICAL & SURGICAL HISTORY:  Chronic atrial fibrillation    CHF, chronic    Hypertension    Hypertension    Osteoarthritis    Dementia    GERD (gastroesophageal reflux disease)    Iron deficiency anemia    History of cholecystectomy    MEDICATIONS  (STANDING):  digoxin     Tablet 125 MICROGram(s) Oral <User Schedule>  diltiazem    milliGRAM(s) Oral daily  memantine ER 28 milliGRAM(s) Oral daily  metoprolol succinate  milliGRAM(s) Oral daily  pantoprazole  Injectable 40 milliGRAM(s) IV Push daily  rivaroxaban 15 milliGRAM(s) Oral with dinner    MEDICATIONS  (PRN):  zolpidem 5 milliGRAM(s) Oral at bedtime PRN Insomnia    Allergies  No Known Allergies    Vital Signs Last 24 Hrs  T(C): 36.8 (26 Apr 2022 04:29), Max: 36.8 (26 Apr 2022 04:29)  T(F): 98.2 (26 Apr 2022 04:29), Max: 98.2 (26 Apr 2022 04:29)  HR: 77 (26 Apr 2022 05:32) (69 - 102)  BP: 114/76 (26 Apr 2022 05:32) (100/65 - 131/78)  BP(mean): --  RR: 18 (26 Apr 2022 04:29) (18 - 22)  SpO2: 98% (26 Apr 2022 04:29) (98% - 100%)  I&O's Summary    Weight (kg): 44.9 (04-25 @ 22:39)    TELE: A fib 80-90s   PHYSICAL EXAM:  Constitutional: NAD, awake and alert, Frail   HEENT: Moist Mucous Membranes, Anicteric  Pulmonary: Non-labored, breath sounds are clear bilaterally, No wheezing, rales or rhonchi  Cardiovascular: Regular, S1 and S2, + murmurs, rubs, gallops or clicks  Gastrointestinal:  soft, nontender, nondistended   Lymph: No peripheral edema. No lymphadenopathy.   Skin: No visible rashes or ulcers.  Psych:  Mood & affect appropriate      LABS: All Labs Reviewed:                        13.3   6.33  )-----------( 231      ( 26 Apr 2022 07:56 )             39.0                         14.7   8.83  )-----------( 234      ( 25 Apr 2022 14:34 )             42.4     26 Apr 2022 07:56    139    |  104    |  54     ----------------------------<  103    3.5     |  24     |  1.80   25 Apr 2022 14:34    138    |  103    |  53     ----------------------------<  105    4.3     |  19     |  2.00     Ca    9.1        26 Apr 2022 07:56  Ca    9.9        25 Apr 2022 14:34  Mg     3.2       25 Apr 2022 14:34    TPro  7.1    /  Alb  3.5    /  TBili  0.7    /  DBili  x      /  AST  21     /  ALT  15     /  AlkPhos  76     26 Apr 2022 07:56  TPro  7.9    /  Alb  4.1    /  TBili  1.0    /  DBili  x      /  AST  17     /  ALT  14     /  AlkPhos  85     25 Apr 2022 14:34   LIVER FUNCTIONS - ( 26 Apr 2022 07:56 )  Alb: 3.5 g/dL / Pro: 7.1 g/dL / ALK PHOS: 76 U/L / ALT: 15 U/L / AST: 21 U/L / GGT: x           PT/INR - ( 25 Apr 2022 14:34 )   PT: 13.2 sec;   INR: 1.13 ratio         PTT - ( 25 Apr 2022 14:34 )  PTT:33.1 secTroponin I, High Sensitivity Result: 108.5 ng/L (04-26-22 @ 07:56)  Troponin I, High Sensitivity Result: 124.2 ng/L (04-26-22 @ 00:02)  Troponin I, High Sensitivity Result: 116.5 ng/L (04-25-22 @ 21:34)  Troponin I, High Sensitivity Result: 86.9 ng/L (04-25-22 @ 14:34)        Patient name: LINDSAY SALDAÑA  YOB: 1924   Age: 89 (F)   MR#: [id number]  Study Date: 3/8/2022  [facility]  Study quality: Technically good  Referring Physician: Khari Haynes MD / Georges Villarreal MD  Blood Pressure: 124/64 mmHg  Height: 145 cm  Weight: 42 kg  BSA: 1.3 m2  Heart Rate: 59 mmHg  ------------------------------------------------------------------------  PROCEDURE: Transthoracic echocardiogram with 2-D, M-Mode  and complete spectral and color flow Doppler.  INDICATION: Nonrheumatic aortic (valve) stenosis (I35.0)  ------------------------------------------------------------------------  MEASUREMENTS: (See below)  ------------------------------------------------------------------------  OBSERVATIONS:  Mitral Valve: There is extensive mitral annular, leaflets  and subvalvular calcification. Mild mitral regurgitation.  The peak/mean gradients= 4/1mmHg (HR= 61bpm).  Aortic Root: Aortic Root: 2.9 cm.  Ascending Aorta: 3.1 cm.  LVOT diameter: 2 cm.  Aortic Valve: Calcified trileaflet aortic valve with  decreased opening.  There is LVOT calcification. Peak transaortic valve  gradient equals 61 mm Hg, mean transaortic valve gradient  equals 29 mm Hg, estimated aortic valve area equals 0.9  sqcm (by continuity equation), aortic valve velocity time  integral equals 88 cm, the DVI= 0.27, consistent with  moderate-severe aortic stenosis. Minimal aortic  regurgitation.  Peak left ventricular outflow tract  gradient equals 4 mm Hg, mean gradient is equal to 3 mm Hg,  LVOT velocity time integral equals 24 cm.  Left Atrium: Severely dilated left atrium.  LA volume index  = 91 cc/m2.  Left Ventricle: The left ventricular function is increased.   The LVEF= 65-70%. No regional wall motion abnormalities.  Normal left ventricular size with moderate concentric  hypertrophy.  Right Heart: Normal right atrium. Normal right ventricular  size and function. Normal tricuspid valve. Moderate  tricuspid regurgitation. Normal pulmonic valve. No pulmonic  regurgitation.  Pericardium/PleuraThere is a trace pericardial effusion.  Hemodynamic: The estimated right atrial pressure is normal.  Estimated right ventricular systolic pressure equals 36 mm  Hg, assuming right atrial pressure equals 5 mm Hg,  consistent with borderline pulmonary hypertension.  ------------------------------------------------------------------------  CONCLUSIONS:  1. Calcified trileaflet aortic valve with decreased  opening. Peak transaortic valve gradient equals 61 mm Hg,  mean transaortic valve gradient equals 29 mm Hg, estimated  aortic valve area equals 0.9 sqcm (by continuity equation),  aortic valve velocity time integral equals 88 cm, the DVI=  0.27, consistent with moderate-severe aortic stenosis. No  aortic valve regurgitation seen.  *** No previous Echo exam.  ------------------------------------------------------------------------  PROCEDURE DESCRIPTION: Transthoracic echocardiogram with  2-D, M-Mode and complete spectral and color flow Doppler.  ------------------------------------------------------------------------  ECHOCARDIOGRAPHIC EXAMINATION:  AORTIC ROOT:  Aortic Root (Leaflet): 2.9 cm  Aortic Root Index: 1.0  Aortic Tubular: 3.1 cm  LEFT ATRIUM:  AP Dimensions: 4.1 cm  LA Volume Index: 91.00 cc/sqm  LA Volume: 117.8 cc  LEFT VENTRICLE:  LVIDd: 4.1 cm  LVIDs: 2.6 cm  Fraction Short: 37 %  IVS: 1.49  ILWT: 1.4 cm  RWT: 0.70  LV Mass: 234.0 gm  LV Mass Index: 180 gm/sqm  EF (Visual Estimate): 65-70%  HR and BP:  HR: 59 bpm  BP: 124/64  BSA: 1.30  ------------------------------------------------------------------------  HEMODYNAMICS:  LA Pressure Estimate: < 20  PAS: 36  IVRT: 106 ms  ------------------------------------------------------------------------  COLOR FLOW and SPECIAL DOPPLER:  AORTIC VALVE:  AV Peak Velocity: 3.9 M/sec  AV Peak Gradient: 60 mm Hg  AV Mean Gradient: 29 mm Hg  Valve Area: 0.9 sqcm  LVOT:  LVOT Velocity: 1.0 M/sec  LVOT Diameter: 2.0 cm  LVOT Peak Gradient Rest: 4 mm Hg  LVOT Mean Gradient Rest: 3 mm Hg  ------------------------------------------------------------------------  DIASTOLIC FUNCTION:  DT:303 ms  e' Septal: 5.0 cm/s  E/e' Septal: 20.0  e' Lateral: 6.0 cm/s  E/e' Lateral: 16.6  MV E wave: 1.0 m/s  ------------------------------------------------------------------------  Confirmed on  3/8/2022 - 10:15:38 by Aye Mckeon M.D.  ------------------------------------------------------------------------ [facility] -- Sage Monzon, Jessica Gray, Lonny Alegre, Isabella Handley: Office # [phone]    Follow Up:  chest pain, valvular disease     Subjective/Observations: Patient seen and examined. Patient awake, alert, resting in bed. Does not appear to be in any distress. Tolerating O2 via nasal cannula.     REVIEW OF SYSTEMS: All other review of systems are negative unless indicated above    PAST MEDICAL & SURGICAL HISTORY:  Chronic atrial fibrillation    CHF, chronic    Hypertension    Hypertension    Osteoarthritis    Dementia    GERD (gastroesophageal reflux disease)    Iron deficiency anemia    History of cholecystectomy    MEDICATIONS  (STANDING):  digoxin     Tablet 125 MICROGram(s) Oral <User Schedule>  diltiazem    milliGRAM(s) Oral daily  memantine ER 28 milliGRAM(s) Oral daily  metoprolol succinate  milliGRAM(s) Oral daily  pantoprazole  Injectable 40 milliGRAM(s) IV Push daily  rivaroxaban 15 milliGRAM(s) Oral with dinner    MEDICATIONS  (PRN):  zolpidem 5 milliGRAM(s) Oral at bedtime PRN Insomnia    Allergies  No Known Allergies    Vital Signs Last 24 Hrs  T(C): 36.8 (26 Apr 2022 04:29), Max: 36.8 (26 Apr 2022 04:29)  T(F): 98.2 (26 Apr 2022 04:29), Max: 98.2 (26 Apr 2022 04:29)  HR: 77 (26 Apr 2022 05:32) (69 - 102)  BP: 114/76 (26 Apr 2022 05:32) (100/65 - 131/78)  BP(mean): --  RR: 18 (26 Apr 2022 04:29) (18 - 22)  SpO2: 98% (26 Apr 2022 04:29) (98% - 100%)  I&O's Summary    Weight (kg): 44.9 (04-25 @ 22:39)    TELE: A fib 80-90s   PHYSICAL EXAM:  Constitutional: NAD, awake and alert, Frail   HEENT: Moist Mucous Membranes, Anicteric  Pulmonary: Non-labored, breath sounds are clear bilaterally, No wheezing, rales or rhonchi  Cardiovascular: IRRR, S1 and S2, + murmurs, rubs, gallops or clicks  Gastrointestinal:  soft, nontender, nondistended   Lymph: No peripheral edema. No lymphadenopathy.   Skin: No visible rashes or ulcers.  Psych:  Mood & affect appropriate      LABS: All Labs Reviewed:                        13.3   6.33  )-----------( 231      ( 26 Apr 2022 07:56 )             39.0                         14.7   8.83  )-----------( 234      ( 25 Apr 2022 14:34 )             42.4     26 Apr 2022 07:56    139    |  104    |  54     ----------------------------<  103    3.5     |  24     |  1.80   25 Apr 2022 14:34    138    |  103    |  53     ----------------------------<  105    4.3     |  19     |  2.00     Ca    9.1        26 Apr 2022 07:56  Ca    9.9        25 Apr 2022 14:34  Mg     3.2       25 Apr 2022 14:34    TPro  7.1    /  Alb  3.5    /  TBili  0.7    /  DBili  x      /  AST  21     /  ALT  15     /  AlkPhos  76     26 Apr 2022 07:56  TPro  7.9    /  Alb  4.1    /  TBili  1.0    /  DBili  x      /  AST  17     /  ALT  14     /  AlkPhos  85     25 Apr 2022 14:34   LIVER FUNCTIONS - ( 26 Apr 2022 07:56 )  Alb: 3.5 g/dL / Pro: 7.1 g/dL / ALK PHOS: 76 U/L / ALT: 15 U/L / AST: 21 U/L / GGT: x           PT/INR - ( 25 Apr 2022 14:34 )   PT: 13.2 sec;   INR: 1.13 ratio         PTT - ( 25 Apr 2022 14:34 )  PTT:33.1 secTroponin I, High Sensitivity Result: 108.5 ng/L (04-26-22 @ 07:56)  Troponin I, High Sensitivity Result: 124.2 ng/L (04-26-22 @ 00:02)  Troponin I, High Sensitivity Result: 116.5 ng/L (04-25-22 @ 21:34)  Troponin I, High Sensitivity Result: 86.9 ng/L (04-25-22 @ 14:34)        Patient name: LINDSAY SALDAÑA  YOB: 1924   Age: 89 (F)   MR#: [id number]  Study Date: 3/8/2022  [facility]  Study quality: Technically good  Referring Physician: Khari Haynes MD / Georges Villarreal MD  Blood Pressure: 124/64 mmHg  Height: 145 cm  Weight: 42 kg  BSA: 1.3 m2  Heart Rate: 59 mmHg  ------------------------------------------------------------------------  PROCEDURE: Transthoracic echocardiogram with 2-D, M-Mode  and complete spectral and color flow Doppler.  INDICATION: Nonrheumatic aortic (valve) stenosis (I35.0)  ------------------------------------------------------------------------  MEASUREMENTS: (See below)  ------------------------------------------------------------------------  OBSERVATIONS:  Mitral Valve: There is extensive mitral annular, leaflets  and subvalvular calcification. Mild mitral regurgitation.  The peak/mean gradients= 4/1mmHg (HR= 61bpm).  Aortic Root: Aortic Root: 2.9 cm.  Ascending Aorta: 3.1 cm.  LVOT diameter: 2 cm.  Aortic Valve: Calcified trileaflet aortic valve with  decreased opening.  There is LVOT calcification. Peak transaortic valve  gradient equals 61 mm Hg, mean transaortic valve gradient  equals 29 mm Hg, estimated aortic valve area equals 0.9  sqcm (by continuity equation), aortic valve velocity time  integral equals 88 cm, the DVI= 0.27, consistent with  moderate-severe aortic stenosis. Minimal aortic  regurgitation.  Peak left ventricular outflow tract  gradient equals 4 mm Hg, mean gradient is equal to 3 mm Hg,  LVOT velocity time integral equals 24 cm.  Left Atrium: Severely dilated left atrium.  LA volume index  = 91 cc/m2.  Left Ventricle: The left ventricular function is increased.   The LVEF= 65-70%. No regional wall motion abnormalities.  Normal left ventricular size with moderate concentric  hypertrophy.  Right Heart: Normal right atrium. Normal right ventricular  size and function. Normal tricuspid valve. Moderate  tricuspid regurgitation. Normal pulmonic valve. No pulmonic  regurgitation.  Pericardium/PleuraThere is a trace pericardial effusion.  Hemodynamic: The estimated right atrial pressure is normal.  Estimated right ventricular systolic pressure equals 36 mm  Hg, assuming right atrial pressure equals 5 mm Hg,  consistent with borderline pulmonary hypertension.  ------------------------------------------------------------------------  CONCLUSIONS:  1. Calcified trileaflet aortic valve with decreased  opening. Peak transaortic valve gradient equals 61 mm Hg,  mean transaortic valve gradient equals 29 mm Hg, estimated  aortic valve area equals 0.9 sqcm (by continuity equation),  aortic valve velocity time integral equals 88 cm, the DVI=  0.27, consistent with moderate-severe aortic stenosis. No  aortic valve regurgitation seen.  *** No previous Echo exam.  ------------------------------------------------------------------------  PROCEDURE DESCRIPTION: Transthoracic echocardiogram with  2-D, M-Mode and complete spectral and color flow Doppler.  ------------------------------------------------------------------------  ECHOCARDIOGRAPHIC EXAMINATION:  AORTIC ROOT:  Aortic Root (Leaflet): 2.9 cm  Aortic Root Index: 1.0  Aortic Tubular: 3.1 cm  LEFT ATRIUM:  AP Dimensions: 4.1 cm  LA Volume Index: 91.00 cc/sqm  LA Volume: 117.8 cc  LEFT VENTRICLE:  LVIDd: 4.1 cm  LVIDs: 2.6 cm  Fraction Short: 37 %  IVS: 1.49  ILWT: 1.4 cm  RWT: 0.70  LV Mass: 234.0 gm  LV Mass Index: 180 gm/sqm  EF (Visual Estimate): 65-70%  HR and BP:  HR: 59 bpm  BP: 124/64  BSA: 1.30  ------------------------------------------------------------------------  HEMODYNAMICS:  LA Pressure Estimate: < 20  PAS: 36  IVRT: 106 ms  ------------------------------------------------------------------------  COLOR FLOW and SPECIAL DOPPLER:  AORTIC VALVE:  AV Peak Velocity: 3.9 M/sec  AV Peak Gradient: 60 mm Hg  AV Mean Gradient: 29 mm Hg  Valve Area: 0.9 sqcm  LVOT:  LVOT Velocity: 1.0 M/sec  LVOT Diameter: 2.0 cm  LVOT Peak Gradient Rest: 4 mm Hg  LVOT Mean Gradient Rest: 3 mm Hg  ------------------------------------------------------------------------  DIASTOLIC FUNCTION:  DT:303 ms  e' Septal: 5.0 cm/s  E/e' Septal: 20.0  e' Lateral: 6.0 cm/s  E/e' Lateral: 16.6  MV E wave: 1.0 m/s  ------------------------------------------------------------------------  Confirmed on  3/8/2022 - 10:15:38 by Aye Mckeon M.D.  ------------------------------------------------------------------------

## 2022-04-26 NOTE — DIETITIAN INITIAL EVALUATION ADULT - PERTINENT LABORATORY DATA
04-26    139  |  104  |  54<H>  ----------------------------<  103<H>  3.5   |  24  |  1.80<H>    Ca    9.1      26 Apr 2022 07:56  Mg     3.2     04-25    TPro  7.1  /  Alb  3.5  /  TBili  0.7  /  DBili  x   /  AST  21  /  ALT  15  /  AlkPhos  76  04-26

## 2022-04-26 NOTE — DISCHARGE NOTE PROVIDER - NSDCCPCAREPLAN_GEN_ALL_CORE_FT
PRINCIPAL DISCHARGE DIAGNOSIS  Diagnosis: Aortic stenosis  Assessment and Plan of Treatment: You were admitted to the hospital for symptomatic aortic stenosis, which is narrowing of one of your heart valves (aortic valve). You were evaluated by a cardiologist who recommended transfer to [facility] for further evaluation.      SECONDARY DISCHARGE DIAGNOSES  Diagnosis: BRADY (acute kidney injury)  Assessment and Plan of Treatment: Your kidney function was elevated when you were admitted to the hospital. This is likely due to the issues with your heart valve. Your kidney function will continue to be monitored while at [facility].    Diagnosis: HTN (hypertension)  Assessment and Plan of Treatment: Continue your current medication regimen for high blood pressure.     PRINCIPAL DISCHARGE DIAGNOSIS  Diagnosis: Aortic stenosis  Assessment and Plan of Treatment: You were admitted to the hospital for symptomatic aortic stenosis, which is narrowing of one of your heart valves (aortic valve).   -Recent ECHO done showed severe AS   -Eval for possible TAVR at [facility] with Dr Haynes  -You were evaluated by a cardiologist who recommended transfer to [facility] for further evaluation.      SECONDARY DISCHARGE DIAGNOSES  Diagnosis: Chest pressure  Assessment and Plan of Treatment: SOB with Chest pressure 2/2 severe AS      Diagnosis: Chronic atrial fibrillation  Assessment and Plan of Treatment: stabel HR ON Toprol XL,  Cardizem  mg daily &  Digoxin q OD   -On Xarelto daily    Diagnosis: BRADY (acute kidney injury)  Assessment and Plan of Treatment: -BRADY with CKD 2 ,   -Encourage oral hydration, Fluid restriction 1 lit/24 hrs   -HOLD Spiranolactone.   -Lasix 40 mg every other day.  Your kidney function will continue to be monitored while at [facility].    Diagnosis: GERD (gastroesophageal reflux disease)  Assessment and Plan of Treatment: -On PPI & Pepcid    Diagnosis: HTN (hypertension)  Assessment and Plan of Treatment: On Toprol XL, Cardizem CD    Diagnosis: Dementia  Assessment and Plan of Treatment: Meds as directed     PRINCIPAL DISCHARGE DIAGNOSIS  Diagnosis: Aortic stenosis  Assessment and Plan of Treatment: You were admitted to the hospital for symptomatic aortic stenosis, which is narrowing of one of your heart valves (aortic valve).   -Recent ECHO done showed severe AS   -Eval for possible TAVR at [facility] with Dr Haynes  -You were evaluated by a cardiologist who recommended transfer to [facility] for further evaluation.      SECONDARY DISCHARGE DIAGNOSES  Diagnosis: Chest pressure  Assessment and Plan of Treatment: SOB with Chest pressure 2/2 severe AS      Diagnosis: Chronic atrial fibrillation  Assessment and Plan of Treatment: stabel HR ON Toprol XL,  Cardizem  mg daily &  Digoxin q OD   -On Xarelto daily    Diagnosis: BRADY (acute kidney injury)  Assessment and Plan of Treatment: -BRADY with CKD 2 ,   -Encourage oral hydration, Fluid restriction 1 lit/24 hrs   -HOLD Spiranolactone.   -Lasix 40 mg every other day.  Your kidney function will continue to be monitored while at [facility].    Diagnosis: GERD (gastroesophageal reflux disease)  Assessment and Plan of Treatment: -On PPI & Pepcid    Diagnosis: HTN (hypertension)  Assessment and Plan of Treatment: On Toprol XL, Cardizem CD    Diagnosis: Dementia  Assessment and Plan of Treatment: Meds as directed    Diagnosis: Chronic diastolic congestive heart failure  Assessment and Plan of Treatment: 2/2 severe AS   -Lasix 40 mg 1 tab every other day.     PRINCIPAL DISCHARGE DIAGNOSIS  Diagnosis: Aortic stenosis  Assessment and Plan of Treatment: You were admitted to the hospital for symptomatic aortic stenosis, which is narrowing of one of your heart valves (aortic valve).   -Recent ECHO done showed severe AS   -Eval for possible TAVR at [facility] with Dr Haynes  -You were evaluated by a cardiologist who recommended transfer to [facility] for further evaluation.      SECONDARY DISCHARGE DIAGNOSES  Diagnosis: BRADY (acute kidney injury)  Assessment and Plan of Treatment: -BRADY with CKD 2 ,   -Encourage oral hydration, Fluid restriction 1 lit/24 hrs   -HOLD Spiranolactone.   -Lasix 40 mg every other day.  Your kidney function will continue to be monitored while at [facility].    Diagnosis: HTN (hypertension)  Assessment and Plan of Treatment: On Toprol XL, Cardizem CD    Diagnosis: Chest pressure  Assessment and Plan of Treatment: SOB with Chest pressure 2/2 severe AS      Diagnosis: Chronic atrial fibrillation  Assessment and Plan of Treatment: stabel HR ON Toprol XL,  Cardizem  mg daily &  Digoxin q OD   -On Xarelto daily    Diagnosis: GERD (gastroesophageal reflux disease)  Assessment and Plan of Treatment: -On PPI & Pepcid    Diagnosis: Dementia  Assessment and Plan of Treatment: Meds as directed    Diagnosis: Chronic diastolic congestive heart failure  Assessment and Plan of Treatment: 2/2 severe AS   -Lasix 40 mg 1 tab every other day.     PRINCIPAL DISCHARGE DIAGNOSIS  Diagnosis: Aortic stenosis  Assessment and Plan of Treatment: You were admitted to the hospital for symptomatic aortic stenosis, which is narrowing of one of your heart valves (aortic valve).   -Recent ECHO done showed severe AS   Upon discharge, please:  -CONTINUE furosemide 40mg EVERY OTHER DAY  Please follow up with your primary care doctor AND CARDIOLOGIST  within 1 week of discharge from the hospital.  You or your family member are responsible for making all follow up appointments.  If you develop worsening shortness of breath, dizziness , please return to the ED immediately.      SECONDARY DISCHARGE DIAGNOSES  Diagnosis: BRADY (acute kidney injury)  Assessment and Plan of Treatment: -BRADY with CKD 2 ,   -Encourage oral hydration, Fluid restriction 1 lit/24 hrs   -HOLD Spiranolactone.   -Lasix 40 mg every other day.  Please follow up with your primary care doctor AND CARDIOLOGIST within 1 week of discharge from the hospital.    Diagnosis: HTN (hypertension)  Assessment and Plan of Treatment: On Toprol XL, Cardizem CD    Diagnosis: Chest pressure  Assessment and Plan of Treatment: SOB with Chest pressure 2/2 severe AS      Diagnosis: Chronic atrial fibrillation  Assessment and Plan of Treatment: stabel HR ON Toprol XL,  Cardizem  mg daily &  Digoxin q OD   -On Xarelto daily    Diagnosis: GERD (gastroesophageal reflux disease)  Assessment and Plan of Treatment: -On PPI & Pepcid    Diagnosis: Dementia  Assessment and Plan of Treatment: Meds as directed    Diagnosis: Chronic diastolic congestive heart failure  Assessment and Plan of Treatment: 2/2 severe AS   -Lasix 40 mg 1 tab every other day.     PRINCIPAL DISCHARGE DIAGNOSIS  Diagnosis: Aortic stenosis  Assessment and Plan of Treatment: You were admitted to the hospital for symptomatic aortic stenosis, which is narrowing of one of your heart valves (aortic valve).   -Recent ECHO done showed severe AS   Upon discharge, please:  -CONTINUE furosemide 40mg EVERY OTHER DAY  Please follow up with your primary care doctor AND CARDIOLOGIST  within 1 week of discharge from the hospital.  You or your family member are responsible for making all follow up appointments.  If you develop worsening shortness of breath, dizziness , please return to the ED immediately.      SECONDARY DISCHARGE DIAGNOSES  Diagnosis: BRADY (acute kidney injury)  Assessment and Plan of Treatment: -BRADY with CKD 2 ,   -Encourage oral hydration, Fluid restriction 1 lit/24 hrs   -HOLD Spiranolactone.   -Lasix 40 mg every other day.  Please follow up with your primary care doctor AND CARDIOLOGIST within 1 week of discharge from the hospital.    Diagnosis: HTN (hypertension)  Assessment and Plan of Treatment: On Toprol XL, Cardizem CD    Diagnosis: Chest pressure  Assessment and Plan of Treatment: SOB with Chest pressure 2/2 severe AS      Diagnosis: Chronic atrial fibrillation  Assessment and Plan of Treatment: stabel HR ON Toprol XL,  Cardizem  mg daily &  Digoxin q OD   -On Xarelto daily    Diagnosis: GERD (gastroesophageal reflux disease)  Assessment and Plan of Treatment: -On PPI & Pepcid    Diagnosis: Dementia  Assessment and Plan of Treatment: Meds as directed    Diagnosis: Chronic diastolic congestive heart failure  Assessment and Plan of Treatment: 2/2 severe AS   -Lasix 40 mg 1 tab every other day.    Diagnosis: Chronic constipation  Assessment and Plan of Treatment: You have a history of chronic constipation. You were seen by a GI doctor.   Upon discharge, please:  -START senna 2 tablets at night  -START Linzess daily  Please follow up with your PCP upon discharge.     PRINCIPAL DISCHARGE DIAGNOSIS  Diagnosis: Aortic stenosis  Assessment and Plan of Treatment: You were admitted to the hospital for symptomatic aortic stenosis, which is narrowing of one of your heart valves (aortic valve).   -Recent ECHO done showed severe AS   Upon discharge, please:  -CONTINUE furosemide 40mg EVERY OTHER DAY  Please follow up with your primary care doctor AND CARDIOLOGIST  within 1 week of discharge from the hospital.  You or your family member are responsible for making all follow up appointments.  If you develop worsening shortness of breath, dizziness , please return to the ED immediately.      SECONDARY DISCHARGE DIAGNOSES  Diagnosis: BRADY (acute kidney injury)  Assessment and Plan of Treatment: You presented to the hospital with elevated kidney function. This was likely due to overdiuresis.  Upon discharge, please:  -Encourage oral hydration, Fluid restriction 1 lit/24 hrs   -HOLD Spiranolactone.   -Lasix 40 mg every other day.  Please follow up with your primary care doctor AND CARDIOLOGIST within 1 week of discharge from the hospital.    Diagnosis: HTN (hypertension)  Assessment and Plan of Treatment: On Toprol XL, Cardizem CD    Diagnosis: Chest pressure  Assessment and Plan of Treatment: SOB with Chest pressure 2/2 severe AS      Diagnosis: Chronic atrial fibrillation  Assessment and Plan of Treatment: stabel HR ON Toprol XL,  Cardizem  mg daily &  Digoxin q OD   -On Xarelto daily    Diagnosis: GERD (gastroesophageal reflux disease)  Assessment and Plan of Treatment: -On PPI & Pepcid    Diagnosis: Dementia  Assessment and Plan of Treatment: Meds as directed    Diagnosis: Chronic diastolic congestive heart failure  Assessment and Plan of Treatment: 2/2 severe AS   -Lasix 40 mg 1 tab every other day.    Diagnosis: Chronic constipation  Assessment and Plan of Treatment: You have a history of chronic constipation. You were seen by a GI doctor.   Upon discharge, please:  -START senna 2 tablets at night  -START Linzess daily  Please follow up with your PCP upon discharge.     PRINCIPAL DISCHARGE DIAGNOSIS  Diagnosis: Aortic stenosis  Assessment and Plan of Treatment: You were admitted to the hospital for symptomatic  severe aortic stenosis, which is narrowing of one of your heart valves (aortic valve).   -Recent ECHO done showed severe AS   Upon discharge, please:  -CONTINUE furosemide 40mg EVERY OTHER DAY  Please follow up with your primary care doctor AND CARDIOLOGIST  within 1 week of discharge from the hospital.  You or your family member are responsible for making all follow up appointments.  If you develop worsening shortness of breath, dizziness , please return to the ED immediately.      SECONDARY DISCHARGE DIAGNOSES  Diagnosis: BRADY (acute kidney injury)  Assessment and Plan of Treatment: You presented to the hospital with elevated kidney function. This was likely due to overdiuresis.  Upon discharge, please:  -Encourage oral hydration, Fluid restriction 1 lit/24 hrs   -STOP  Spiranolactone.   -Lasix 40 mg every 1 tab  other day.  Please follow up with your primary care doctor AND CARDIOLOGIST within 1 week of discharge from the hospital.    Diagnosis: Chronic diastolic congestive heart failure  Assessment and Plan of Treatment: 2/2 severe AS   -Lasix 40 mg 1 tab every other day.  -STOP Spiranolactone  -Fluid restriction 1 lit/24 hrs    Diagnosis: HTN (hypertension)  Assessment and Plan of Treatment: On Toprol XL 1 tab daily , Cardizem CD1 tab daily    Diagnosis: Chronic atrial fibrillation  Assessment and Plan of Treatment: stabel HR ON Toprol XL,  Cardizem  mg daily &  Digoxin 1 tab every other day.   -On Xarelto 1 tab daily daily    Diagnosis: GERD (gastroesophageal reflux disease)  Assessment and Plan of Treatment: -On PPI daily  -Take Pepcid 20 mg 1tab daily as needed,    Diagnosis: Chronic constipation  Assessment and Plan of Treatment: You have a history of chronic constipation. You were seen by a GI doctor.   Upon discharge, please:  -START senna 2 tablets at night  -START Linzess daily  Please follow up with your PCP upon discharge.     PRINCIPAL DISCHARGE DIAGNOSIS  Diagnosis: Aortic stenosis  Assessment and Plan of Treatment: You were admitted to the hospital for symptomatic  severe aortic stenosis, which is narrowing of one of your heart valves (aortic valve).   -Recent ECHO done showed severe AS   Upon discharge, please:  -CONTINUE furosemide 40mg EVERY OTHER DAY  Please follow up with your primary care doctor AND CARDIOLOGIST  within 1 week of discharge from the hospital.  You or your family member are responsible for making all follow up appointments.  If you develop worsening shortness of breath, dizziness , please return to the ED immediately.      SECONDARY DISCHARGE DIAGNOSES  Diagnosis: BRADY (acute kidney injury)  Assessment and Plan of Treatment: You presented to the hospital with elevated kidney function. This was likely due to overdiuresis.  Upon discharge, please:  -Encourage oral hydration, Fluid restriction 1 lit/24 hrs   -STOP  Spiranolactone.   -Lasix 40 mg every 1 tab  other day.  Please follow up with your primary care doctor AND CARDIOLOGIST within 1 week of discharge from the hospital.    Diagnosis: HTN (hypertension)  Assessment and Plan of Treatment: On Toprol XL 1 tab daily , Cardizem CD1 tab daily    Diagnosis: Chronic atrial fibrillation  Assessment and Plan of Treatment: stabel HR ON Toprol XL,  Cardizem  mg daily &  Digoxin 1 tab every other day.   -On Xarelto 1 tab daily daily    Diagnosis: GERD (gastroesophageal reflux disease)  Assessment and Plan of Treatment: -On PPI daily  -Take Pepcid 20 mg 1tab daily as needed,    Diagnosis: Chronic diastolic congestive heart failure  Assessment and Plan of Treatment: 2/2 severe AS   -Lasix 40 mg 1 tab every other day. K Cl 20 meq liquid everyother day day with Lasix dose  -STOP Spiranolactone  -Fluid restriction 1 lit/24 hrs    Diagnosis: Chronic constipation  Assessment and Plan of Treatment: You have a history of chronic constipation. You were seen by a GI doctor.   Upon discharge, please:  -START senna 2 tablets at night  -START Linzess daily  Please follow up with your PCP upon discharge.

## 2022-04-26 NOTE — DIETITIAN INITIAL EVALUATION ADULT - PERSON TAUGHT/METHOD
Pts daughter declined HF education as she is aware of guidelines and they have received education in the past.

## 2022-04-26 NOTE — PROGRESS NOTE ADULT - PROBLEM SELECTOR PLAN 6
Pt on pantoprazole 20 mg PO daily  - Start Protonix 40 mg IVP daily while inpatient Continue home memantine 28 mg daily- family to bring from home

## 2022-04-26 NOTE — DIETITIAN INITIAL EVALUATION ADULT - NS FNS DIET ORDER
Diet, DASH/TLC:   Sodium & Cholesterol Restricted  1000mL Fluid Restriction ([id number]) (04-25-22 @ 17:44)

## 2022-04-26 NOTE — DISCHARGE NOTE PROVIDER - NSDCFUADDINST_GEN_ALL_CORE_FT
Please follow up with your cardiologist as scheduled once discharged from [facility]. Please follow up with your primary care doctor and CARDIOLOGIST within 1 week of discharge from the hospital.  You or your family member are responsible for making all follow up appointments.  If you develop worsening shortness of breath, dizziness, please return to the ED immediately.

## 2022-04-26 NOTE — DISCHARGE NOTE PROVIDER - PROVIDER TOKENS
PROVIDER:[TOKEN:[[id number]],FOLLOWUP:[1-3 days]] PROVIDER:[TOKEN:[[id number]],FOLLOWUP:[1-3 days]],PROVIDER:[TOKEN:[[id number]]]

## 2022-04-26 NOTE — PROGRESS NOTE ADULT - ASSESSMENT
89 female  PMH Afib on Xarelto, CHF, HTN, GERD, osteoarthritis, anxiety, iron deficiency anemia presents to ER with daughter c/o chest pain, left sided, radiating to left arm, shortness of breath, worse with exertion for the past few days and generalized weakness,     A fib, sever AS, HTN, HLD   - Patient p/w YOUNG, chest tightness and arm pain   - Patient takes Lasix 40 mg every other day due to hypotension  - Echocardiogram 03/2022 demonstrates an NIELS of 0.9 sq cm, a DVI of 0.27, peak and mean gradients of 61 and 29 mmHg, and an AoV of 3.9 m/s; as per our review with Dr Aye Mckeon, Director of Structural Heart Echocardiography, these findings are consistent with moderate to severe AS., mild MR, EF= 65-70%, mod conc LVH, mod TR, borderline pulm HTN  - Pt was initially declined for TAVR, contacted [facility] and is nor accepted for transfer to [facility] for structural heart evaluation (accepting physician Dr. Haynes) for possible valvuloplasty   - Serum Pro-Brain Natriuretic Peptide: 9188   - CXR clear   - Continue 40 mg PO every other day. Assess volume status daily  - Strict intake and output    - Troponin: <-108.5, <-124.2, <-116.5, <-86.9, elevated likely to reduced renal clearance from BRADY  - Stable trops, no need to trend further   - EKG showed A fib   - No signs of acute ischemia      - A fib rate in 70-80s on tele, would continue monitoring   - Continue Cardizem po 120 qd, Digoxin, Toprol 100 mg   - Continue Xarelto  - Would like to keep HR close to 60s given severe AS     - Trend creatinine. Patient baseline in 1, now 1.8    - Monitor and replete lytes, keep K>4, Mg>2.  - Will continue to follow.    Christiano Love, MS FNP, [facility]  Nurse Practitioner- Cardiology   Spectra #[phone]   89 female  PMH Afib on Xarelto, CHF, HTN, GERD, osteoarthritis, anxiety, iron deficiency anemia presents to ER with daughter c/o chest pain, left sided, radiating to left arm, shortness of breath, worse with exertion for the past few days and generalized weakness,     A fib, sever AS, HTN, HLD   - Patient p/w YOUNG, chest tightness and arm pain   - Echocardiogram 03/2022 demonstrates an NIELS of 0.9 sq cm, a DVI of 0.27, peak and mean gradients of 61 and 29 mmHg, and an AoV of 3.9 m/s; as per our review with Dr Aye Mckeon, Director of Structural Heart Echocardiography, these findings are consistent with moderate to severe AS., mild MR, EF= 65-70%, mod conc LVH, mod TR, borderline pulm HTN  - Pt initially declined TAVR, contacted [facility] and is now accepted for transfer to [facility] for structural heart evaluation (accepting physician Dr. Haynes) for possible valvuloplasty   - Serum Pro-Brain Natriuretic Peptide: 9188   - CXR clear   - Continue Lasix 40 mg PO every other day. Assess volume status daily  - Strict intake and output  - may need IVF as she is not eating much    - Troponin: <-108.5, <-124.2, <-116.5, <-86.9, elevated likely to reduced renal clearance from BRADY  - Stable trops, no need to trend further   - EKG showed A fib   - No signs of acute ischemia      - A fib rate in 70-80s on tele, would continue monitoring   - Continue Cardizem po 120 qd, Digoxin, Toprol 100 mg   - Continue Xarelto  - Would like to keep HR close to 60s given severe AS     - Trend creatinine. Patient baseline in 1, now 1.8    - Monitor and replete lytes, keep K>4, Mg>2.  - Will continue to follow.    Christiano Love, MS FNP, [facility]  Nurse Practitioner- Cardiology   Spectra #[phone]

## 2022-04-26 NOTE — DISCHARGE NOTE PROVIDER - HOSPITAL COURSE
FROM ADMISSION H+P:   HPI:  This is a 89 y/o female with PMHx of Afib on Xarelto, CHF, HTN, GERD, osteoarthritis, anxiety, iron deficiency anemia who presents with 3 days of worsening dyspnea on exertion along with chest discomfort. Hx was obtained from pt's daughter, as hx was limited due to patient's dementia. At baseline, pt lives alone with help from an aide, but is able to perform most of her ADLs and cook on her own without difficulty. However, pt has had increasing difficulty with ADLs due to her shortness of breath. Pt also has midline back pain radiating to both arms and some chest discomfort that occurs when the patient is short of breath. She has not had any fevers, chills, lightheadedness, palpitations, abd pain, constipation/diarrhea, dysuria, or pain/swelling in the legs. Pt has no other complaints or concerns at this time, and only current symptom is L-sided back/arm pain.    ED course:  T 95.9, , /72, RR 22, SpO2 100% on 2L NC  Labs significant for BUN/Cr 53/2.00, BNP 9188, TropI 86.9  CXR negative for acute pathology  EKG AFib, rate 86, LAFB, nonspecific ST/T wave abnormality  Given Lasix 20 mg IVP, Cardizem 10 mg IVP in ED   (25 Apr 2022 18:07)      ---  HOSPITAL COURSE:       Patient was medically optimized and improved clinically throughout hospital course. Patient seen and examined on day of discharge.    Vital Signs Last 24 Hrs  Physical Exam:    Patient is medically stable for discharge to ____ with outpatient follow up.  ---  CONSULTANTS:     ---  TIME SPENT:  I, the attending physician, was physically present for the key portions of the evaluation and management (E/M) service provided. The total amount of time spent reviewing the hospital notes, laboratory values, imaging findings, assessing/counseling the patient, discussing with consultant physicians, social work, nursing staff was -- minutes    ---     FROM ADMISSION H+P:   HPI:  This is a 89 y/o female with PMHx of Afib on Xarelto, CHF, HTN, GERD, osteoarthritis, anxiety, iron deficiency anemia who presents with 3 days of worsening dyspnea on exertion along with chest discomfort. Hx was obtained from pt's daughter, as hx was limited due to patient's dementia. At baseline, pt lives alone with help from an aide, but is able to perform most of her ADLs and cook on her own without difficulty. However, pt has had increasing difficulty with ADLs due to her shortness of breath. Pt also has midline back pain radiating to both arms and some chest discomfort that occurs when the patient is short of breath. She has not had any fevers, chills, lightheadedness, palpitations, abd pain, constipation/diarrhea, dysuria, or pain/swelling in the legs. Pt has no other complaints or concerns at this time, and only current symptom is L-sided back/arm pain.    ED course:  T 95.9, , /72, RR 22, SpO2 100% on 2L NC  Labs significant for BUN/Cr 53/2.00, BNP 9188, TropI 86.9  CXR negative for acute pathology  EKG AFib, rate 86, LAFB, nonspecific ST/T wave abnormality  Given Lasix 20 mg IVP, Cardizem 10 mg IVP in ED   (25 Apr 2022 18:07)      ---  HOSPITAL COURSE:   Patient admitted to telemetry for symptomatic aortic stenosis and BRADY. Cardiology consulted, recommended transfer to [facility] for structural  heart evaluation and possible BAV. Patient also presented with BRADY, likely secondary to decreased effective circulating volume due to AS vs. overdiuresis. Lasix were adjusted to every other day. Patient was accepted for transfer to [facility] on 4/26, accepting physician Dr. Haynes.    Patient was medically optimized for transfer to [facility]. Patient seen and examined on day of transfer.    Vital Signs Last 24 Hrs  Physical Exam:    Patient is medically stable for transfer to [facility].  ---  CONSULTANTS:   Cardio: Dr. Alegre  ---  TIME SPENT:  I, the attending physician, was physically present for the key portions of the evaluation and management (E/M) service provided. The total amount of time spent reviewing the hospital notes, laboratory values, imaging findings, assessing/counseling the patient, discussing with consultant physicians, social work, nursing staff was -- minutes    ---     FROM ADMISSION H+P:   HPI:  This is a 89 y/o female with PMHx of Afib on Xarelto, CHF, HTN, GERD, osteoarthritis, anxiety, iron deficiency anemia who presents with 3 days of worsening dyspnea on exertion along with chest discomfort. Hx was obtained from pt's daughter, as hx was limited due to patient's dementia. At baseline, pt lives alone with help from an aide, but is able to perform most of her ADLs and cook on her own without difficulty. However, pt has had increasing difficulty with ADLs due to her shortness of breath. Pt also has midline back pain radiating to both arms and some chest discomfort that occurs when the patient is short of breath. She has not had any fevers, chills, lightheadedness, palpitations, abd pain, constipation/diarrhea, dysuria, or pain/swelling in the legs. Pt has no other complaints or concerns at this time, and only current symptom is L-sided back/arm pain.    ED course:  T 95.9, , /72, RR 22, SpO2 100% on 2L NC  Labs significant for BUN/Cr 53/2.00, BNP 9188, TropI 86.9  CXR negative for acute pathology  EKG AFib, rate 86, LAFB, nonspecific ST/T wave abnormality  Given Lasix 20 mg IVP, Cardizem 10 mg IVP in ED   (25 Apr 2022 18:07)      ---  HOSPITAL COURSE:   Patient admitted to telemetry for Chest pressure & YOUNG ,  symptomatic  severe aortic stenosis and BRADY./ CKD 2-3 Cardiology consulted, recommended transfer to [facility] for structural  heart evaluation and possible BAVR. Patient also presented with BRADY, likely secondary to decreased effective circulating volume due to AS vs. overdiuresis. Lasix were adjusted to every other day. Patient was accepted for transfer to [facility] accepting physician Dr. Contreras for TAVR Eval..    Patient was medically optimized for transfer to [facility]. Patient seen and examined on day of transfer.    Vital Signs Last 24 Hrs  Physical Exam:    Patient is medically stable for transfer to [facility].  ---  CONSULTANTS:   Cardio: Dr. Alegre  ---  TIME SPENT:  I, the attending physician, was physically present for the key portions of the evaluation and management (E/M) service provided. The total amount of time spent reviewing the hospital notes, laboratory values, imaging findings, assessing/counseling the patient, discussing with consultant physicians, social work, nursing staff was -60 minutes    ---     FROM ADMISSION H+P:   HPI:  This is a 89 y/o female with PMHx of Afib on Xarelto, CHF, HTN, GERD, osteoarthritis, anxiety, iron deficiency anemia who presents with 3 days of worsening dyspnea on exertion along with chest discomfort. Hx was obtained from pt's daughter, as hx was limited due to patient's dementia. At baseline, pt lives alone with help from an aide, but is able to perform most of her ADLs and cook on her own without difficulty. However, pt has had increasing difficulty with ADLs due to her shortness of breath. Pt also has midline back pain radiating to both arms and some chest discomfort that occurs when the patient is short of breath. She has not had any fevers, chills, lightheadedness, palpitations, abd pain, constipation/diarrhea, dysuria, or pain/swelling in the legs. Pt has no other complaints or concerns at this time, and only current symptom is L-sided back/arm pain.    ED course:  T 95.9, , /72, RR 22, SpO2 100% on 2L NC  Labs significant for BUN/Cr 53/2.00, BNP 9188, TropI 86.9  CXR negative for acute pathology  EKG AFib, rate 86, LAFB, nonspecific ST/T wave abnormality  Given Lasix 20 mg IVP, Cardizem 10 mg IVP in ED   (25 Apr 2022 18:07)      ---  HOSPITAL COURSE:   Patient admitted to telemetry for Chest pressure & YOUNG ,  symptomatic  severe aortic stenosis and BRADY./ CKD 2-3 Cardiology consulted, recommended transfer to [facility] for structural  heart evaluation and possible BAVR. Patient also presented with BRADY, likely secondary to decreased effective circulating volume due to AS vs. overdiuresis. Lasix were adjusted to every other day. Cr downtrended, returned to baseline upon day of transfer. Patient was accepted for transfer to [facility] accepting physician Dr. Contreras for TAVR Eval..    Patient was medically optimized for transfer to [facility]. Patient seen and examined on day of transfer.    Vital Signs Last 24 Hrs  T(C): 36.3 (28 Apr 2022 11:02), Max: 36.4 (27 Apr 2022 19:41)  T(F): 97.4 (28 Apr 2022 11:02), Max: 97.6 (27 Apr 2022 19:41)  HR: 77 (28 Apr 2022 11:02) (74 - 89)  BP: 147/61 (28 Apr 2022 11:02) (95/64 - 147/61)  BP(mean): --  RR: 18 (28 Apr 2022 11:02) (18 - 19)  SpO2: 99% (28 Apr 2022 11:02) (96% - 99%)      PHYSICAL EXAM:  GENERAL:  [ x ] NAD , [x  ] well appearing, [  ] Agitated, [  ] Drowsy,  [  ] Lethargy, [  ] confused   HEAD:  [ x ] Normal, [  ] Other  EYES:  [ x ] EOMI, [ x ] PERRLA, [x  ] conjunctiva and sclera clear normal, [  ] Other,  [  ] Pallor,[  ] Discharge  ENMT:  [x  ] Normal, [ x ] Moist mucous membranes, [x  ] Good dentition, [ x ] No Thrush  NECK:  [x  ] Supple, [x  ] No JVD, [x  ] Normal thyroid, [  ] Lymphadenopathy [  ] Other  CHEST/LUNG:  [ x ] Clear to auscultation bilaterally, [x  ] Breath Sounds equal B/L / Decrease, [x  ] poor effort  [x  ] No rales, [ x ] No rhonchi  [x  ]  No wheezing,   HEART:  [ x ] Regular rate and rhythm, [  ] tachycardia, [  ] Bradycardia,  [  ] irregular  [ x ] No murmurs, No rubs, No gallops, [  ] PPM in place (Mfr:  )  ABDOMEN:  [x  ] Soft, [x  ] Nontender, [  x] Nondistended, [ x ] No mass, [ x ] Bowel sounds present, [  ] obese  NERVOUS SYSTEM:  [x  ] Alert & Oriented X3, [ x ] Nonfocal  [  ] Confusion  [  ] Encephalopathic [  ] Sedated [  ] Unable to assess, [  ] Dementia [  ] Other-  EXTREMITIES: [x  ] 2+ Peripheral Pulses, No clubbing, No cyanosis,  [  ] edema B/L lower EXT. [  ] PVD stasis skin changes B/L Lower EXT, [  ] wound  LYMPH: No lymphadenopathy noted  SKIN:  [x  ] No rashes or lesions, [  ] Pressure Ulcers, [  ] ecchymosis, [  ] Skin Tears, [  ] Other    Patient is medically stable for transfer to [facility].  ---  CONSULTANTS:   Cardio: Dr. Alegre  ---  TIME SPENT:  I, the attending physician, was physically present for the key portions of the evaluation and management (E/M) service provided. The total amount of time spent reviewing the hospital notes, laboratory values, imaging findings, assessing/counseling the patient, discussing with consultant physicians, social work, nursing staff was -60 minutes    ---     FROM ADMISSION H+P:   HPI:  This is a 89 y/o female with PMHx of Afib on Xarelto, CHF, HTN, GERD, osteoarthritis, anxiety, iron deficiency anemia who presents with 3 days of worsening dyspnea on exertion along with chest discomfort. Hx was obtained from pt's daughter, as hx was limited due to patient's dementia. At baseline, pt lives alone with help from an aide, but is able to perform most of her ADLs and cook on her own without difficulty. However, pt has had increasing difficulty with ADLs due to her shortness of breath. Pt also has midline back pain radiating to both arms and some chest discomfort that occurs when the patient is short of breath. She has not had any fevers, chills, lightheadedness, palpitations, abd pain, constipation/diarrhea, dysuria, or pain/swelling in the legs. Pt has no other complaints or concerns at this time, and only current symptom is L-sided back/arm pain.    ED course:  T 95.9, , /72, RR 22, SpO2 100% on 2L NC  Labs significant for BUN/Cr 53/2.00, BNP 9188, TropI 86.9  CXR negative for acute pathology  EKG AFib, rate 86, LAFB, nonspecific ST/T wave abnormality  Given Lasix 20 mg IVP, Cardizem 10 mg IVP in ED   (25 Apr 2022 18:07)      ---  HOSPITAL COURSE:   Patient admitted to telemetry for Chest pressure & YOUNG ,  symptomatic  severe aortic stenosis and BRADY./ CKD 2-3 Cardiology consulted, recommended transfer to [facility] for structural  heart evaluation and possible BAVR. Patient also presented with BRADY, likely secondary to decreased effective circulating volume due to AS vs. overdiuresis. Lasix were adjusted to every other day. Cr downtrended, returned to baseline upon day of discharge. Patient initially to be transferred to [facility] for TAVR eval, however due to lack of bed availability, patient and family opted to discharge her home and return to [facility] if needed.    Patient was medically optimized and seen and examined on day of discharge.     Vital Signs Last 24 Hrs  T(C): 37.4 (29 Apr 2022 12:59), Max: 37.4 (29 Apr 2022 12:59)  T(F): 99.3 (29 Apr 2022 12:59), Max: 99.3 (29 Apr 2022 12:59)  HR: 74 (29 Apr 2022 12:59) (68 - 74)  BP: 121/65 (29 Apr 2022 12:59) (105/57 - 124/69)  BP(mean): --  RR: 18 (29 Apr 2022 12:59) (16 - 18)  SpO2: 97% (29 Apr 2022 12:59) (97% - 99%)      PHYSICAL EXAM: i am good, on 2L NC  GENERAL:  [ x ] NAD , [x  ] well appearing, [  ] Agitated, [  ] Drowsy,  [  ] Lethargy, [  ] confused   HEAD:  [ x ] Normal, [  ] Other  EYES:  [ x ] EOMI, [ x ] PERRLA, [x  ] conjunctiva and sclera clear normal, [  ] Other,  [  ] Pallor,[  ] Discharge  ENMT:  [x  ] Normal, [ x ] Moist mucous membranes, [x  ] Good dentition, [ x ] No Thrush  NECK:  [x  ] Supple, [x  ] No JVD, [x  ] Normal thyroid, [  ] Lymphadenopathy [  ] Other  CHEST/LUNG:  [ x ] Clear to auscultation bilaterally, [x  ] Breath Sounds equal B/L / Decrease, [x  ] poor effort  [x  ] No rales, [ x ] No rhonchi  [x  ]  No wheezing,   HEART:  [ x ] Regular rate and rhythm, [  ] tachycardia, [  ] Bradycardia,  [  ] irregular  [ x ] No murmurs, No rubs, No gallops, [  ] PPM in place (Mfr:  )  ABDOMEN:  [x  ] Soft, [x  ] Nontender, [  x] Nondistended, [ x ] No mass, [ x ] Bowel sounds present, [  ] obese  NERVOUS SYSTEM:  [x  ] Alert & Oriented X3, [ x ] Nonfocal  [  ] Confusion  [  ] Encephalopathic [  ] Sedated [  ] Unable to assess, [  ] Dementia [  ] Other-  EXTREMITIES: [x  ] 2+ Peripheral Pulses, No clubbing, No cyanosis,  [  ] edema B/L lower EXT. [  ] PVD stasis skin changes B/L Lower EXT, [  ] wound  LYMPH: No lymphadenopathy noted  SKIN:  [x  ] No rashes or lesions, [  ] Pressure Ulcers, [  ] ecchymosis, [  ] Skin Tears, [  ] Other    Patient is medically stable for discharge home with close outpatient follow up.  ---  CONSULTANTS:   Cardio: Dr. Alegre  ---  TIME SPENT:  I, the attending physician, was physically present for the key portions of the evaluation and management (E/M) service provided. The total amount of time spent reviewing the hospital notes, laboratory values, imaging findings, assessing/counseling the patient, discussing with consultant physicians, social work, nursing staff was -60 minutes    ---     FROM ADMISSION H+P:   HPI:  This is a 89 y/o female with PMHx of Afib on Xarelto, CHF, HTN, GERD, osteoarthritis, anxiety, iron deficiency anemia who presents with 3 days of worsening dyspnea on exertion along with chest discomfort. Hx was obtained from pt's daughter, as hx was limited due to patient's dementia. At baseline, pt lives alone with help from an aide, but is able to perform most of her ADLs and cook on her own without difficulty. However, pt has had increasing difficulty with ADLs due to her shortness of breath. Pt also has midline back pain radiating to both arms and some chest discomfort that occurs when the patient is short of breath. She has not had any fevers, chills, lightheadedness, palpitations, abd pain, constipation/diarrhea, dysuria, or pain/swelling in the legs. Pt has no other complaints or concerns at this time, and only current symptom is L-sided back/arm pain.    ED course:  T 95.9, , /72, RR 22, SpO2 100% on 2L NC  Labs significant for BUN/Cr 53/2.00, BNP 9188, TropI 86.9  CXR negative for acute pathology  EKG AFib, rate 86, LAFB, nonspecific ST/T wave abnormality  Given Lasix 20 mg IVP, Cardizem 10 mg IVP in ED   (25 Apr 2022 18:07)      ---  HOSPITAL COURSE:   Patient admitted to telemetry for Chest pressure & YOUNG ,  symptomatic  severe aortic stenosis and BRADY./ CKD 2-3 Cardiology consulted, recommended transfer to [facility] for structural  heart evaluation and possible BAVR. Patient also presented with BRADY, likely secondary to decreased effective circulating volume due to AS vs. overdiuresis. Lasix were adjusted to every other day. Cr downtrended, returned to baseline upon day of discharge. Patient initially to be transferred to [facility] for TAVR eval, however due to lack of bed availability, patient and family opted to discharge her home and return to [facility] if needed. GI consulted for hx of chronic constipation. Rec starting senna and Linzess. PT evaluated patient, no PT needs.     Patient was medically optimized and seen and examined on day of discharge.     Vital Signs Last 24 Hrs  T(C): 37.4 (29 Apr 2022 12:59), Max: 37.4 (29 Apr 2022 12:59)  T(F): 99.3 (29 Apr 2022 12:59), Max: 99.3 (29 Apr 2022 12:59)  HR: 74 (29 Apr 2022 12:59) (68 - 74)  BP: 121/65 (29 Apr 2022 12:59) (105/57 - 124/69)  BP(mean): --  RR: 18 (29 Apr 2022 12:59) (16 - 18)  SpO2: 97% (29 Apr 2022 12:59) (97% - 99%)      PHYSICAL EXAM: i am good, on 2L NC  GENERAL:  [ x ] NAD , [x  ] well appearing, [  ] Agitated, [  ] Drowsy,  [  ] Lethargy, [  ] confused   HEAD:  [ x ] Normal, [  ] Other  EYES:  [ x ] EOMI, [ x ] PERRLA, [x  ] conjunctiva and sclera clear normal, [  ] Other,  [  ] Pallor,[  ] Discharge  ENMT:  [x  ] Normal, [ x ] Moist mucous membranes, [x  ] Good dentition, [ x ] No Thrush  NECK:  [x  ] Supple, [x  ] No JVD, [x  ] Normal thyroid, [  ] Lymphadenopathy [  ] Other  CHEST/LUNG:  [ x ] Clear to auscultation bilaterally, [x  ] Breath Sounds equal B/L / Decrease, [x  ] poor effort  [x  ] No rales, [ x ] No rhonchi  [x  ]  No wheezing,   HEART:  [ x ] Regular rate and rhythm, [  ] tachycardia, [  ] Bradycardia,  [  ] irregular  [ x ] No murmurs, No rubs, No gallops, [  ] PPM in place (Mfr:  )  ABDOMEN:  [x  ] Soft, [x  ] Nontender, [  x] Nondistended, [ x ] No mass, [ x ] Bowel sounds present, [  ] obese  NERVOUS SYSTEM:  [x  ] Alert & Oriented X3, [ x ] Nonfocal  [  ] Confusion  [  ] Encephalopathic [  ] Sedated [  ] Unable to assess, [  ] Dementia [  ] Other-  EXTREMITIES: [x  ] 2+ Peripheral Pulses, No clubbing, No cyanosis,  [  ] edema B/L lower EXT. [  ] PVD stasis skin changes B/L Lower EXT, [  ] wound  LYMPH: No lymphadenopathy noted  SKIN:  [x  ] No rashes or lesions, [  ] Pressure Ulcers, [  ] ecchymosis, [  ] Skin Tears, [  ] Other    Patient is medically stable for discharge home with close outpatient follow up.  ---  CONSULTANTS:   Cardio: Dr. Alegre  ---  TIME SPENT:  I, the attending physician, was physically present for the key portions of the evaluation and management (E/M) service provided. The total amount of time spent reviewing the hospital notes, laboratory values, imaging findings, assessing/counseling the patient, discussing with consultant physicians, social work, nursing staff was -60 minutes    ---     FROM ADMISSION H+P:   HPI:  This is a 89 y/o female with PMHx of Afib on Xarelto, CHF, HTN, GERD, osteoarthritis, anxiety, iron deficiency anemia who presents with 3 days of worsening dyspnea on exertion along with chest discomfort. Hx was obtained from pt's daughter, as hx was limited due to patient's dementia. At baseline, pt lives alone with help from an aide, but is able to perform most of her ADLs and cook on her own without difficulty. However, pt has had increasing difficulty with ADLs due to her shortness of breath. Pt also has midline back pain radiating to both arms and some chest discomfort that occurs when the patient is short of breath. She has not had any fevers, chills, lightheadedness, palpitations, abd pain, constipation/diarrhea, dysuria, or pain/swelling in the legs. Pt has no other complaints or concerns at this time, and only current symptom is L-sided back/arm pain.    ED course:  T 95.9, , /72, RR 22, SpO2 100% on 2L NC  Labs significant for BUN/Cr 53/2.00, BNP 9188, TropI 86.9  CXR negative for acute pathology  EKG AFib, rate 86, LAFB, nonspecific ST/T wave abnormality  Given Lasix 20 mg IVP, Cardizem 10 mg IVP in ED   (25 Apr 2022 18:07)      ---  HOSPITAL COURSE:   Patient admitted to telemetry for Chest pressure & YOUNG ,  symptomatic  severe aortic stenosis and BRADY./ CKD 2-3 Cardiology consulted, recommended transfer to [facility] for structural  heart evaluation and possible TAVR/BAVR. Transfer was initiated, however due to lack of bed availability, patient and family opted to discharge her home and return to [facility] if needed. Patient also presented with BRADY, likely secondary to decreased effective circulating volume due to AS vs. overdiuresis. Lasix were adjusted to every other day. Cr downtrended, returned to baseline upon day of discharge.GI consulted for hx of chronic constipation. Rec starting senna and Linzess upon dc. Overnight of 4/29 patient had frequent episodes of nonsustained bradycardia. Medications were adjusted to *******. PT evaluated patient, rec home with home PT.      Patient was medically optimized and seen and examined on day of discharge.     Vital Signs Last 24 Hrs  T(C): 36.6 (30 Apr 2022 04:47), Max: 37.4 (29 Apr 2022 12:59)  T(F): 97.8 (30 Apr 2022 04:47), Max: 99.3 (29 Apr 2022 12:59)  HR: 58 (30 Apr 2022 04:47) (55 - 74)  BP: 109/60 (30 Apr 2022 04:47) (92/56 - 121/65)  BP(mean): --  RR: 16 (30 Apr 2022 04:47) (16 - 18)  SpO2: 96% (30 Apr 2022 04:47) (95% - 97%)      PHYSICAL EXAM: i am good, on 2L NC  GENERAL:  [ x ] NAD , [x  ] well appearing, [  ] Agitated, [  ] Drowsy,  [  ] Lethargy, [  ] confused   HEAD:  [ x ] Normal, [  ] Other  EYES:  [ x ] EOMI, [ x ] PERRLA, [x  ] conjunctiva and sclera clear normal, [  ] Other,  [  ] Pallor,[  ] Discharge  ENMT:  [x  ] Normal, [ x ] Moist mucous membranes, [x  ] Good dentition, [ x ] No Thrush  NECK:  [x  ] Supple, [x  ] No JVD, [x  ] Normal thyroid, [  ] Lymphadenopathy [  ] Other  CHEST/LUNG:  [ x ] Clear to auscultation bilaterally, [x  ] Breath Sounds equal B/L / Decrease, [x  ] poor effort  [x  ] No rales, [ x ] No rhonchi  [x  ]  No wheezing,   HEART:  [ x ] Regular rate and rhythm, [  ] tachycardia, [  ] Bradycardia,  [  ] irregular  [ x ] No murmurs, No rubs, No gallops, [  ] PPM in place (Mfr:  )  ABDOMEN:  [x  ] Soft, [x  ] Nontender, [  x] Nondistended, [ x ] No mass, [ x ] Bowel sounds present, [  ] obese  NERVOUS SYSTEM:  [x  ] Alert & Oriented X3, [ x ] Nonfocal  [  ] Confusion  [  ] Encephalopathic [  ] Sedated [  ] Unable to assess, [  ] Dementia [  ] Other-  EXTREMITIES: [x  ] 2+ Peripheral Pulses, No clubbing, No cyanosis,  [  ] edema B/L lower EXT. [  ] PVD stasis skin changes B/L Lower EXT, [  ] wound  LYMPH: No lymphadenopathy noted  SKIN:  [x  ] No rashes or lesions, [  ] Pressure Ulcers, [  ] ecchymosis, [  ] Skin Tears, [  ] Other    Patient is medically stable for discharge home with close outpatient follow up.  ---  CONSULTANTS:   Cardio: Dr. Alegre  ---  TIME SPENT:  I, the attending physician, was physically present for the key portions of the evaluation and management (E/M) service provided. The total amount of time spent reviewing the hospital notes, laboratory values, imaging findings, assessing/counseling the patient, discussing with consultant physicians, social work, nursing staff was -60 minutes    ---     FROM ADMISSION H+P:   HPI:  This is a 89 y/o female with PMHx of Afib on Xarelto, CHF, HTN, GERD, osteoarthritis, anxiety, iron deficiency anemia who presents with 3 days of worsening dyspnea on exertion along with chest discomfort. Hx was obtained from pt's daughter, as hx was limited due to patient's dementia. At baseline, pt lives alone with help from an aide, but is able to perform most of her ADLs and cook on her own without difficulty. However, pt has had increasing difficulty with ADLs due to her shortness of breath. Pt also has midline back pain radiating to both arms and some chest discomfort that occurs when the patient is short of breath. She has not had any fevers, chills, lightheadedness, palpitations, abd pain, constipation/diarrhea, dysuria, or pain/swelling in the legs. Pt has no other complaints or concerns at this time, and only current symptom is L-sided back/arm pain.    ED course:  T 95.9, , /72, RR 22, SpO2 100% on 2L NC  Labs significant for BUN/Cr 53/2.00, BNP 9188, TropI 86.9  CXR negative for acute pathology  EKG AFib, rate 86, LAFB, nonspecific ST/T wave abnormality  Given Lasix 20 mg IVP, Cardizem 10 mg IVP in ED   (25 Apr 2022 18:07)      ---  HOSPITAL COURSE:   Patient admitted to telemetry for Chest pressure & YOUNG ,  symptomatic  severe aortic stenosis and BRADY./ CKD 2-3 Cardiology consulted, recommended transfer to [facility] for structural  heart evaluation and possible TAVR/BAVR. Transfer was initiated, however due to lack of bed availability, patient and family opted to discharge her home and return to [facility] if needed. Patient also presented with BRADY, likely secondary to decreased effective circulating volume due to AS vs. overdiuresis. Lasix were adjusted to every other day. Cr downtrended, returned to baseline upon day of discharge.GI consulted for hx of chronic constipation. Rec starting senna and Linzess upon dc. Overnight of 4/29 patient had frequent episodes of nonsustained bradycardia. Medications were adjusted to *******. PT evaluated patient, rec home with home PT.      Patient was medically optimized and seen and examined on day of discharge.       Patient is medically stable for discharge home with close outpatient follow up.  ---  CONSULTANTS:   Cardio: Dr. Sis LEÓN-DR [place]  ---  TIME SPENT:  I, the attending physician, was physically present for the key portions of the evaluation and management (E/M) service provided. The total amount of time spent reviewing the hospital notes, laboratory values, imaging findings, assessing/counseling the patient, discussing with consultant physicians, social work, nursing staff was -60 minutes    ---

## 2022-04-26 NOTE — DIETITIAN INITIAL EVALUATION ADULT - OTHER INFO
Pt awake/alert with hx dementia; daughter at bedside at time of visit. Dash/TLC diet w/ 1L po FR rx. Daughter reports pt has very small appetite. No report difficulty chewing/swallowing. No report N/V/D. Hx constipation, significant bowel regimen pta. Recommend stool softeners/laxatives while in hospital. UBW 90-95lbs. Follows low na diet pta. Daughter reports ~5lb unintentional weight loss over past 8 weeks with decrease in appetite/po intake. Agreeable to trialing ensure enlive (strawberry). Additional food preferences/meal alternatives obtained to maximize po intake.

## 2022-04-27 LAB
ALBUMIN SERPL ELPH-MCNC: 3.4 G/DL — SIGNIFICANT CHANGE UP (ref 3.3–5)
ALP SERPL-CCNC: 74 U/L — SIGNIFICANT CHANGE UP (ref 40–120)
ALT FLD-CCNC: 17 U/L — SIGNIFICANT CHANGE UP (ref 12–78)
ANION GAP SERPL CALC-SCNC: 8 MMOL/L — SIGNIFICANT CHANGE UP (ref 5–17)
AST SERPL-CCNC: 22 U/L — SIGNIFICANT CHANGE UP (ref 15–37)
BASOPHILS # BLD AUTO: 0.02 K/UL — SIGNIFICANT CHANGE UP (ref 0–0.2)
BASOPHILS NFR BLD AUTO: 0.3 % — SIGNIFICANT CHANGE UP (ref 0–2)
BILIRUB SERPL-MCNC: 0.6 MG/DL — SIGNIFICANT CHANGE UP (ref 0.2–1.2)
BUN SERPL-MCNC: 44 MG/DL — HIGH (ref 7–23)
CALCIUM SERPL-MCNC: 9.3 MG/DL — SIGNIFICANT CHANGE UP (ref 8.5–10.1)
CHLORIDE SERPL-SCNC: 102 MMOL/L — SIGNIFICANT CHANGE UP (ref 96–108)
CO2 SERPL-SCNC: 26 MMOL/L — SIGNIFICANT CHANGE UP (ref 22–31)
CREAT SERPL-MCNC: 1.5 MG/DL — HIGH (ref 0.5–1.3)
EGFR: 32 ML/MIN/1.73M2 — LOW
EOSINOPHIL # BLD AUTO: 0.1 K/UL — SIGNIFICANT CHANGE UP (ref 0–0.5)
EOSINOPHIL NFR BLD AUTO: 1.7 % — SIGNIFICANT CHANGE UP (ref 0–6)
GLUCOSE SERPL-MCNC: 110 MG/DL — HIGH (ref 70–99)
HCT VFR BLD CALC: 38.9 % — SIGNIFICANT CHANGE UP (ref 34.5–45)
HGB BLD-MCNC: 13.2 G/DL — SIGNIFICANT CHANGE UP (ref 11.5–15.5)
IMM GRANULOCYTES NFR BLD AUTO: 0.3 % — SIGNIFICANT CHANGE UP (ref 0–1.5)
LYMPHOCYTES # BLD AUTO: 1.41 K/UL — SIGNIFICANT CHANGE UP (ref 1–3.3)
LYMPHOCYTES # BLD AUTO: 23.6 % — SIGNIFICANT CHANGE UP (ref 13–44)
MCHC RBC-ENTMCNC: 29.3 PG — SIGNIFICANT CHANGE UP (ref 27–34)
MCHC RBC-ENTMCNC: 33.9 GM/DL — SIGNIFICANT CHANGE UP (ref 32–36)
MCV RBC AUTO: 86.3 FL — SIGNIFICANT CHANGE UP (ref 80–100)
MONOCYTES # BLD AUTO: 0.68 K/UL — SIGNIFICANT CHANGE UP (ref 0–0.9)
MONOCYTES NFR BLD AUTO: 11.4 % — SIGNIFICANT CHANGE UP (ref 2–14)
NEUTROPHILS # BLD AUTO: 3.74 K/UL — SIGNIFICANT CHANGE UP (ref 1.8–7.4)
NEUTROPHILS NFR BLD AUTO: 62.7 % — SIGNIFICANT CHANGE UP (ref 43–77)
NRBC # BLD: 0 /100 WBCS — SIGNIFICANT CHANGE UP (ref 0–0)
PLATELET # BLD AUTO: 210 K/UL — SIGNIFICANT CHANGE UP (ref 150–400)
POTASSIUM SERPL-MCNC: 3.2 MMOL/L — LOW (ref 3.5–5.3)
POTASSIUM SERPL-SCNC: 3.2 MMOL/L — LOW (ref 3.5–5.3)
PROT SERPL-MCNC: 6.6 G/DL — SIGNIFICANT CHANGE UP (ref 6–8.3)
RBC # BLD: 4.51 M/UL — SIGNIFICANT CHANGE UP (ref 3.8–5.2)
RBC # FLD: 13.9 % — SIGNIFICANT CHANGE UP (ref 10.3–14.5)
SODIUM SERPL-SCNC: 136 MMOL/L — SIGNIFICANT CHANGE UP (ref 135–145)
WBC # BLD: 5.97 K/UL — SIGNIFICANT CHANGE UP (ref 3.8–10.5)
WBC # FLD AUTO: 5.97 K/UL — SIGNIFICANT CHANGE UP (ref 3.8–10.5)

## 2022-04-27 PROCEDURE — 99232 SBSQ HOSP IP/OBS MODERATE 35: CPT

## 2022-04-27 RX ORDER — SENNA PLUS 8.6 MG/1
2 TABLET ORAL AT BEDTIME
Refills: 0 | Status: DISCONTINUED | OUTPATIENT
Start: 2022-04-27 | End: 2022-04-30

## 2022-04-27 RX ORDER — ALPRAZOLAM 0.25 MG
0.12 TABLET ORAL ONCE
Refills: 0 | Status: DISCONTINUED | OUTPATIENT
Start: 2022-04-27 | End: 2022-04-27

## 2022-04-27 RX ORDER — POTASSIUM CHLORIDE 20 MEQ
40 PACKET (EA) ORAL ONCE
Refills: 0 | Status: COMPLETED | OUTPATIENT
Start: 2022-04-27 | End: 2022-04-27

## 2022-04-27 RX ADMIN — SENNA PLUS 2 TABLET(S): 8.6 TABLET ORAL at 21:44

## 2022-04-27 RX ADMIN — Medication 40 MILLIEQUIVALENT(S): at 09:22

## 2022-04-27 RX ADMIN — RIVAROXABAN 15 MILLIGRAM(S): KIT at 18:44

## 2022-04-27 RX ADMIN — FAMOTIDINE 20 MILLIGRAM(S): 10 INJECTION INTRAVENOUS at 11:43

## 2022-04-27 RX ADMIN — ZOLPIDEM TARTRATE 5 MILLIGRAM(S): 10 TABLET ORAL at 21:45

## 2022-04-27 RX ADMIN — PANTOPRAZOLE SODIUM 40 MILLIGRAM(S): 20 TABLET, DELAYED RELEASE ORAL at 11:43

## 2022-04-27 RX ADMIN — Medication 0.12 MILLIGRAM(S): at 21:44

## 2022-04-27 RX ADMIN — Medication 120 MILLIGRAM(S): at 05:14

## 2022-04-27 NOTE — PROGRESS NOTE ADULT - SUBJECTIVE AND OBJECTIVE BOX
Patient is a 89y old  Female who presents with a chief complaint of Heart failure     (26 Apr 2022 14:01)    HPI:  This is a 89 y/o female with PMHx of Afib on Xarelto, CHF, HTN, GERD, osteoarthritis, anxiety, iron deficiency anemia who presents with 3 days of worsening dyspnea on exertion along with chest discomfort. Hx was obtained from pt's daughter, as hx was limited due to patient's dementia. At baseline, pt lives alone with help from an aide, but is able to perform most of her ADLs and cook on her own without difficulty. However, pt has had increasing difficulty with ADLs due to her shortness of breath. Pt also has midline back pain radiating to both arms and some chest discomfort that occurs when the patient is short of breath. She has not had any fevers, chills, lightheadedness, palpitations, abd pain, constipation/diarrhea, dysuria, or pain/swelling in the legs. Pt has no other complaints or concerns at this time, and only current symptom is L-sided back/arm pain.    ED course:  T 95.9, , /72, RR 22, SpO2 100% on 2L NC  Labs significant for BUN/Cr 53/2.00, BNP 9188, TropI 86.9  CXR negative for acute pathology  EKG AFib, rate 86, LAFB, nonspecific ST/T wave abnormality  Given Lasix 20 mg IVP, Cardizem 10 mg IVP in ED   (25 Apr 2022 18:07)    INTERVAL HPI:  4/26/22: Patient was seen and examined at bedside. States she is feeling well and her breathing has improved. Denies any chest pain or palpitations. Transfer planning to [facility].   4/27/22: Patient was seen and examined at bedside. Denies any acute complaints. Transfer to [facility], awaiting bed.     OVERNIGHT EVENTS:  No acute overnight events.     Home Medications:  Ambien 10 mg oral tablet: 1 tab(s) orally once a day (at bedtime) (25 Apr 2022 20:01)  famotidine 20 mg oral tablet: 1 tab(s) orally once a day (26 Apr 2022 22:42)  Lasix 40 mg oral tablet: 1 tab(s) orally every other day (25 Apr 2022 20:01)  memantine 28 mg oral capsule, extended release: 1 cap(s) orally once a day (25 Apr 2022 20:01)  olopatadine 0.2% ophthalmic solution: 1 drop(s) to each affected eye once a day, As Needed (25 Apr 2022 20:01)  pantoprazole 20 mg oral delayed release tablet: 1 tab(s) orally once a day (25 Apr 2022 20:01)  rivaroxaban 15 mg oral tablet: 1 tab(s) orally once a day (25 Apr 2022 20:01)  Toprol- mg oral tablet, extended release: 1 tab(s) orally once a day (25 Apr 2022 20:01)  Vitamin D3 1250 mcg (50,000 intl units) oral capsule: 1 cap(s) orally once a week on Monday  (25 Apr 2022 20:01)      MEDICATIONS  (STANDING):  digoxin     Tablet 125 MICROGram(s) Oral <User Schedule>  diltiazem    milliGRAM(s) Oral daily  famotidine    Tablet 20 milliGRAM(s) Oral daily  metoprolol succinate  milliGRAM(s) Oral daily  pantoprazole  Injectable 40 milliGRAM(s) IV Push daily  potassium chloride   Powder 40 milliEquivalent(s) Oral once  rivaroxaban 15 milliGRAM(s) Oral with dinner    MEDICATIONS  (PRN):  zolpidem 5 milliGRAM(s) Oral at bedtime PRN Insomnia      Allergies    No Known Allergies    Intolerances        Social History:  Lives at home alone, ambulates with walker, has aide to help with ADLs/IADLs  Denies alcohol, tobacco, recreational drug use  Vaccinated for COVID x 3 (Moderna) (25 Apr 2022 18:07)      REVIEW OF SYSTEMS: i am good   CONSTITUTIONAL: No fever, No chills, No fatigue, No myalgia, No Body ache, No Weakness  EYES: No eye pain,  No visual disturbances, No discharge, NO Redness  ENMT:  No ear pain, No nose bleed, No vertigo; No sinus pain, NO throat pain, No Congestion  NECK: No pain, No stiffness  RESPIRATORY: No cough, NO wheezing, No  hemoptysis, NO  shortness of breath  CARDIOVASCULAR: No chest pain, palpitations  GASTROINTESTINAL: No abdominal pain, NO epigastric pain. No nausea, No vomiting; No diarrhea, No constipation. [x  ] No BM  GENITOURINARY: No dysuria, No frequency, No urgency, No hematuria, NO incontinence  NEUROLOGICAL: No headaches, No dizziness, No numbness, No tingling, No tremors, No weakness  EXT: No Swelling, No Pain, No Edema  SKIN:  [ x ] No itching, burning, rashes, or lesions   MUSCULOSKELETAL: No joint pain ,No Jt swelling; No muscle pain, No back pain, No extremity pain  PSYCHIATRIC: No depression,  No anxiety,  No mood swings ,No difficulty sleeping at night  PAIN SCALE: [ x ] None  [  ] Other-  ROS Unable to obtain due to - [  ] Dementia  [  ] Lethargy [  ] Drowsy [  ] Sedated [  ] non verbal  REST OF REVIEW Of SYSTEM - [ x ] Normal       Vital Signs Last 24 Hrs  T(C): 36.3 (27 Apr 2022 04:11), Max: 36.7 (26 Apr 2022 20:24)  T(F): 97.4 (27 Apr 2022 04:11), Max: 98.1 (26 Apr 2022 20:24)  HR: 62 (27 Apr 2022 04:11) (60 - 75)  BP: 108/74 (27 Apr 2022 04:52) (95/55 - 108/74)  BP(mean): --  RR: 18 (27 Apr 2022 04:11) (17 - 18)  SpO2: 98% (27 Apr 2022 04:11) (96% - 98%)  Finger Stick        04-26 @ 07:01  -  04-27 @ 07:00  --------------------------------------------------------  IN: 790 mL / OUT: 0 mL / NET: 790 mL        PHYSICAL EXAM: i am good, on 2L NC  GENERAL:  [ x ] NAD , [x  ] well appearing, [  ] Agitated, [  ] Drowsy,  [  ] Lethargy, [  ] confused   HEAD:  [ x ] Normal, [  ] Other  EYES:  [ x ] EOMI, [ x ] PERRLA, [x  ] conjunctiva and sclera clear normal, [  ] Other,  [  ] Pallor,[  ] Discharge  ENMT:  [x  ] Normal, [ x ] Moist mucous membranes, [  ] Good dentition, [  ] No Thrush  NECK:  [x  ] Supple, [x  ] No JVD, [x  ] Normal thyroid, [  ] Lymphadenopathy [  ] Other  CHEST/LUNG:  [ x ] Clear to auscultation bilaterally, [  ] Breath Sounds equal B/L / Decrease, [  ] poor effort  [x  ] No rales, [ x ] No rhonchi  [x  ]  No wheezing,   HEART:  [ x ] Regular rate and rhythm, [  ] tachycardia, [  ] Bradycardia,  [  ] irregular  [ x ] No murmurs, No rubs, No gallops, [  ] PPM in place (Mfr:  )  ABDOMEN:  [x  ] Soft, [x  ] Nontender, [  x] Nondistended, [ x ] No mass, [ x ] Bowel sounds present, [  ] obese  NERVOUS SYSTEM:  [x  ] Alert & Oriented X3, [  ] Nonfocal  [  ] Confusion  [  ] Encephalopathic [  ] Sedated [  ] Unable to assess, [  ] Dementia [  ] Other-  EXTREMITIES: [x  ] 2+ Peripheral Pulses, No clubbing, No cyanosis,  [  ] edema B/L lower EXT. [  ] PVD stasis skin changes B/L Lower EXT, [  ] wound  LYMPH: No lymphadenopathy noted  SKIN:  [x  ] No rashes or lesions, [  ] Pressure Ulcers, [  ] ecchymosis, [  ] Skin Tears, [  ] Other    DIET: Diet, DASH/TLC:   Sodium & Cholesterol Restricted  1000mL Fluid Restriction ([id number]) (04-25-22 @ 17:44)      LABS:                        13.2   5.97  )-----------( 210      ( 27 Apr 2022 07:44 )             38.9     27 Apr 2022 07:44    136    |  102    |  44     ----------------------------<  110    3.2     |  26     |  1.50     Ca    9.3        27 Apr 2022 07:44    TPro  6.6    /  Alb  3.4    /  TBili  0.6    /  DBili  x      /  AST  22     /  ALT  17     /  AlkPhos  74     27 Apr 2022 07:44    PT/INR - ( 25 Apr 2022 14:34 )   PT: 13.2 sec;   INR: 1.13 ratio         PTT - ( 25 Apr 2022 14:34 )  PTT:33.1 sec              CARDIAC MARKERS ( 25 Apr 2022 14:34 )  x     / x     / x     / x     / 3.0 ng/mL        Serum Pro-Brain Natriuretic Peptide: 9188 pg/mL (04-25-22 @ 14:34)      RADIOLOGY & ADDITIONAL TESTS:      HEALTH ISSUES - PROBLEM Dx:  Chronic atrial fibrillation    HTN (hypertension)    Dementia    GERD (gastroesophageal reflux disease)    Aortic stenosis    BRADY (acute kidney injury)    Allergic conjunctivitis, bilateral    Need for prophylactic measure    Chest pressure        Consultant(s) Notes Reviewed:  [ x ] YES     Care Discussed with [X] Consultants  [ x ] Patient  [ x ] Family- dtr  ] HCP [  ]   [  ] Social Service  [  x] RN, [  ] Physical Therapy,[  ] Palliative care team  DVT PPX: [  ] Lovenox, [  ] S C Heparin, [  ] Coumadin, [  x] Xarelto, [  ] Eliquis, [  ] Pradaxa, [  ] IV Heparin drip, [  ] SCD [  ] Contraindication 2 to GI Bleed,[  ] Ambulation [  ] Contraindicated 2 to  bleed [  ] Contraindicated 2 to Brain Bleed  Advanced directive: [  ] None, [  ] DNR/DNI [x] Prior [facility]  Patient is a 89y old  Female who presents with a chief complaint of Heart failure     (26 Apr 2022 14:01)    HPI:  This is a 89 y/o female with PMHx of Afib on Xarelto, CHF, HTN, GERD, osteoarthritis, anxiety, iron deficiency anemia who presents with 3 days of worsening dyspnea on exertion along with chest discomfort. Hx was obtained from pt's daughter, as hx was limited due to patient's dementia. At baseline, pt lives alone with help from an aide, but is able to perform most of her ADLs and cook on her own without difficulty. However, pt has had increasing difficulty with ADLs due to her shortness of breath. Pt also has midline back pain radiating to both arms and some chest discomfort that occurs when the patient is short of breath. She has not had any fevers, chills, lightheadedness, palpitations, abd pain, constipation/diarrhea, dysuria, or pain/swelling in the legs. Pt has no other complaints or concerns at this time, and only current symptom is L-sided back/arm pain.    ED course:  T 95.9, , /72, RR 22, SpO2 100% on 2L NC  Labs significant for BUN/Cr 53/2.00, BNP 9188, TropI 86.9  CXR negative for acute pathology  EKG AFib, rate 86, LAFB, nonspecific ST/T wave abnormality  Given Lasix 20 mg IVP, Cardizem 10 mg IVP in ED   (25 Apr 2022 18:07)    INTERVAL HPI:  4/26/22: Patient was seen and examined at bedside. States she is feeling well and her breathing has improved. Denies any chest pain or palpitations. Transfer planning to [facility].   4/27/22: Patient was seen and examined at bedside. Denies any acute complaints. Transfer to [facility], awaiting bed.     OVERNIGHT EVENTS:  No acute overnight events.     Home Medications:  Ambien 10 mg oral tablet: 1 tab(s) orally once a day (at bedtime) (25 Apr 2022 20:01)  famotidine 20 mg oral tablet: 1 tab(s) orally once a day (26 Apr 2022 22:42)  Lasix 40 mg oral tablet: 1 tab(s) orally every other day (25 Apr 2022 20:01)  memantine 28 mg oral capsule, extended release: 1 cap(s) orally once a day (25 Apr 2022 20:01)  olopatadine 0.2% ophthalmic solution: 1 drop(s) to each affected eye once a day, As Needed (25 Apr 2022 20:01)  pantoprazole 20 mg oral delayed release tablet: 1 tab(s) orally once a day (25 Apr 2022 20:01)  rivaroxaban 15 mg oral tablet: 1 tab(s) orally once a day (25 Apr 2022 20:01)  Toprol- mg oral tablet, extended release: 1 tab(s) orally once a day (25 Apr 2022 20:01)  Vitamin D3 1250 mcg (50,000 intl units) oral capsule: 1 cap(s) orally once a week on Monday  (25 Apr 2022 20:01)      MEDICATIONS  (STANDING):  digoxin     Tablet 125 MICROGram(s) Oral <User Schedule>  diltiazem    milliGRAM(s) Oral daily  famotidine    Tablet 20 milliGRAM(s) Oral daily  metoprolol succinate  milliGRAM(s) Oral daily  pantoprazole  Injectable 40 milliGRAM(s) IV Push daily  potassium chloride   Powder 40 milliEquivalent(s) Oral once  rivaroxaban 15 milliGRAM(s) Oral with dinner    MEDICATIONS  (PRN):  zolpidem 5 milliGRAM(s) Oral at bedtime PRN Insomnia      Allergies    No Known Allergies    Intolerances        Social History:  Lives at home alone, ambulates with walker, has aide to help with ADLs/IADLs  Denies alcohol, tobacco, recreational drug use  Vaccinated for COVID x 3 (Moderna) (25 Apr 2022 18:07)      REVIEW OF SYSTEMS: i am good   CONSTITUTIONAL: No fever, No chills, No fatigue, No myalgia, No Body ache, No Weakness  EYES: No eye pain,  No visual disturbances, No discharge, NO Redness  ENMT:  No ear pain, No nose bleed, No vertigo; No sinus pain, NO throat pain, No Congestion  NECK: No pain, No stiffness  RESPIRATORY: No cough, NO wheezing, No  hemoptysis, NO  shortness of breath  CARDIOVASCULAR: No chest pain, palpitations  GASTROINTESTINAL: No abdominal pain, NO epigastric pain. No nausea, No vomiting; No diarrhea, No constipation. [x  ] No BM  GENITOURINARY: No dysuria, No frequency, No urgency, No hematuria, NO incontinence  NEUROLOGICAL: No headaches, No dizziness, No numbness, No tingling, No tremors, No weakness  EXT: No Swelling, No Pain, No Edema  SKIN:  [ x ] No itching, burning, rashes, or lesions   MUSCULOSKELETAL: No joint pain ,No Jt swelling; No muscle pain, No back pain, No extremity pain  PSYCHIATRIC: No depression,  No anxiety,  No mood swings ,No difficulty sleeping at night  PAIN SCALE: [ x ] None  [  ] Other-  ROS Unable to obtain due to - [  ] Dementia  [  ] Lethargy [  ] Drowsy [  ] Sedated [  ] non verbal  REST OF REVIEW Of SYSTEM - [ x ] Normal       Vital Signs Last 24 Hrs  T(C): 36.3 (27 Apr 2022 04:11), Max: 36.7 (26 Apr 2022 20:24)  T(F): 97.4 (27 Apr 2022 04:11), Max: 98.1 (26 Apr 2022 20:24)  HR: 62 (27 Apr 2022 04:11) (60 - 75)  BP: 108/74 (27 Apr 2022 04:52) (95/55 - 108/74)  BP(mean): --  RR: 18 (27 Apr 2022 04:11) (17 - 18)  SpO2: 98% (27 Apr 2022 04:11) (96% - 98%)  Finger Stick        04-26 @ 07:01  -  04-27 @ 07:00  --------------------------------------------------------  IN: 790 mL / OUT: 0 mL / NET: 790 mL        PHYSICAL EXAM: i am good, on 2L NC  GENERAL:  [ x ] NAD , [x  ] well appearing, [  ] Agitated, [  ] Drowsy,  [  ] Lethargy, [  ] confused   HEAD:  [ x ] Normal, [  ] Other  EYES:  [ x ] EOMI, [ x ] PERRLA, [x  ] conjunctiva and sclera clear normal, [  ] Other,  [  ] Pallor,[  ] Discharge  ENMT:  [x  ] Normal, [ x ] Moist mucous membranes, [x  ] Good dentition, [ x ] No Thrush  NECK:  [x  ] Supple, [x  ] No JVD, [x  ] Normal thyroid, [  ] Lymphadenopathy [  ] Other  CHEST/LUNG:  [ x ] Clear to auscultation bilaterally, [x  ] Breath Sounds equal B/L / Decrease, [x  ] poor effort  [x  ] No rales, [ x ] No rhonchi  [x  ]  No wheezing,   HEART:  [ x ] Regular rate and rhythm, [  ] tachycardia, [  ] Bradycardia,  [  ] irregular  [ x ] No murmurs, No rubs, No gallops, [  ] PPM in place (Mfr:  )  ABDOMEN:  [x  ] Soft, [x  ] Nontender, [  x] Nondistended, [ x ] No mass, [ x ] Bowel sounds present, [  ] obese  NERVOUS SYSTEM:  [x  ] Alert & Oriented X3, [ x ] Nonfocal  [  ] Confusion  [  ] Encephalopathic [  ] Sedated [  ] Unable to assess, [  ] Dementia [  ] Other-  EXTREMITIES: [x  ] 2+ Peripheral Pulses, No clubbing, No cyanosis,  [  ] edema B/L lower EXT. [  ] PVD stasis skin changes B/L Lower EXT, [  ] wound  LYMPH: No lymphadenopathy noted  SKIN:  [x  ] No rashes or lesions, [  ] Pressure Ulcers, [  ] ecchymosis, [  ] Skin Tears, [  ] Other    DIET: Diet, DASH/TLC:   Sodium & Cholesterol Restricted  1000mL Fluid Restriction ([id number]) (04-25-22 @ 17:44)      LABS:                        13.2   5.97  )-----------( 210      ( 27 Apr 2022 07:44 )             38.9     27 Apr 2022 07:44    136    |  102    |  44     ----------------------------<  110    3.2     |  26     |  1.50     Ca    9.3        27 Apr 2022 07:44    TPro  6.6    /  Alb  3.4    /  TBili  0.6    /  DBili  x      /  AST  22     /  ALT  17     /  AlkPhos  74     27 Apr 2022 07:44    PT/INR - ( 25 Apr 2022 14:34 )   PT: 13.2 sec;   INR: 1.13 ratio         PTT - ( 25 Apr 2022 14:34 )  PTT:33.1 sec              CARDIAC MARKERS ( 25 Apr 2022 14:34 )  x     / x     / x     / x     / 3.0 ng/mL        Serum Pro-Brain Natriuretic Peptide: 9188 pg/mL (04-25-22 @ 14:34)      RADIOLOGY & ADDITIONAL TESTS:      HEALTH ISSUES - PROBLEM Dx:  Chronic atrial fibrillation    HTN (hypertension)    Dementia    GERD (gastroesophageal reflux disease)    Aortic stenosis    BRADY (acute kidney injury)    Allergic conjunctivitis, bilateral    Need for prophylactic measure    Chest pressure        Consultant(s) Notes Reviewed:  [ x ] YES     Care Discussed with [X] Consultants  [ x ] Patient  [ x ] Family- dtr  ] HCP [  ]   [  ] Social Service  [  x] RN, [  ] Physical Therapy,[  ] Palliative care team  DVT PPX: [  ] Lovenox, [  ] S C Heparin, [  ] Coumadin, [  x] Xarelto, [  ] Eliquis, [  ] Pradaxa, [  ] IV Heparin drip, [  ] SCD [  ] Contraindication 2 to GI Bleed,[  ] Ambulation [  ] Contraindicated 2 to  bleed [  ] Contraindicated 2 to Brain Bleed  Advanced directive: [  ] None, [  ] DNR/DNI [x] Prior [facility]

## 2022-04-27 NOTE — PROGRESS NOTE ADULT - SUBJECTIVE AND OBJECTIVE BOX
[facility] -- Sage Monzon, Isaac, Jessica, Lonny Alegre, Isabella Handley: Office # [phone]    Follow Up:  chest pain, valvular disease    Subjective/Observations: Patient seen and examined. Patient awake, alert, resting in bed. denies any chest pain, discomfort, palpitaions, dizziness Tolerating O2 via nasal cannula.     REVIEW OF SYSTEMS: All review of systems is negative for eye, ENT, GI, , allergic, dermatologic, musculoskeletal and neurologic except as described above    PAST MEDICAL & SURGICAL HISTORY:  Chronic atrial fibrillation    CHF, chronic    Hypertension    Osteoarthritis    Dementia    GERD (gastroesophageal reflux disease)    Iron deficiency anemia    History of cholecystectomy        MEDICATIONS  (STANDING):  digoxin     Tablet 125 MICROGram(s) Oral <User Schedule>  diltiazem    milliGRAM(s) Oral daily  famotidine    Tablet 20 milliGRAM(s) Oral daily  metoprolol succinate  milliGRAM(s) Oral daily  pantoprazole  Injectable 40 milliGRAM(s) IV Push daily  rivaroxaban 15 milliGRAM(s) Oral with dinner    MEDICATIONS  (PRN):  zolpidem 5 milliGRAM(s) Oral at bedtime PRN Insomnia    Allergies    No Known Allergies    Intolerances      Vital Signs Last 24 Hrs  T(C): 36.2 (27 Apr 2022 12:06), Max: 36.7 (26 Apr 2022 20:24)  T(F): 97.2 (27 Apr 2022 12:06), Max: 98.1 (26 Apr 2022 20:24)  HR: 62 (27 Apr 2022 04:11) (60 - 75)  BP: 104/66 (27 Apr 2022 12:06) (95/55 - 108/74)  BP(mean): --  RR: 18 (27 Apr 2022 12:06) (17 - 18)  SpO2: 98% (27 Apr 2022 12:06) (96% - 98%)  I&O's Summary    26 Apr 2022 07:01  -  27 Apr 2022 07:00  --------------------------------------------------------  IN: 790 mL / OUT: 0 mL / NET: 790 mL        TELE: A fib 60's   PHYSICAL EXAM:  Constitutional: NAD, awake and alert, Frail   HEENT: Moist Mucous Membranes, Anicteric  Pulmonary: Non-labored, breath sounds are clear bilaterally, No wheezing, rales or rhonchi  Cardiovascular: IRRR, S1 and S2, + murmurs, rubs, gallops or clicks  Gastrointestinal:  soft, nontender, nondistended   Lymph: No peripheral edema. No lymphadenopathy.   Skin: No visible rashes or ulcers.  Psych:  Mood & affect appropriate      LABS: All Labs Reviewed:                        13.2   5.97  )-----------( 210      ( 27 Apr 2022 07:44 )             38.9                         13.3   6.33  )-----------( 231      ( 26 Apr 2022 07:56 )             39.0                         14.7   8.83  )-----------( 234      ( 25 Apr 2022 14:34 )             42.4     27 Apr 2022 07:44    136    |  102    |  44     ----------------------------<  110    3.2     |  26     |  1.50   26 Apr 2022 07:56    139    |  104    |  54     ----------------------------<  103    3.5     |  24     |  1.80   25 Apr 2022 14:34    138    |  103    |  53     ----------------------------<  105    4.3     |  19     |  2.00     Ca    9.3        27 Apr 2022 07:44  Ca    9.1        26 Apr 2022 07:56  Ca    9.9        25 Apr 2022 14:34  Mg     3.2       25 Apr 2022 14:34    TPro  6.6    /  Alb  3.4    /  TBili  0.6    /  DBili  x      /  AST  22     /  ALT  17     /  AlkPhos  74     27 Apr 2022 07:44  TPro  7.1    /  Alb  3.5    /  TBili  0.7    /  DBili  x      /  AST  21     /  ALT  15     /  AlkPhos  76     26 Apr 2022 07:56  TPro  7.9    /  Alb  4.1    /  TBili  1.0    /  DBili  x      /  AST  17     /  ALT  14     /  AlkPhos  85     25 Apr 2022 14:34    PT/INR - ( 25 Apr 2022 14:34 )   PT: 13.2 sec;   INR: 1.13 ratio         PTT - ( 25 Apr 2022 14:34 )  PTT:33.1 sec  Troponin I, High Sensitivity Result: 108.5 ng/L (04-26-22 @ 07:56)  Troponin I, High Sensitivity Result: 124.2 ng/L (04-26-22 @ 00:02)  Troponin I, High Sensitivity Result: 116.5 ng/L (04-25-22 @ 21:34)  Troponin I, High Sensitivity Result: 86.9 ng/L (04-25-22 @ 14:34)                12 Lead ECG:   Ventricular Rate 86 BPM    QRS Duration 112 ms    Q-T Interval 376 ms    QTC Calculation(Bazett) 449 ms    R Axis -53 degrees    T Axis 136 degrees    Diagnosis Line Atrial fibrillation  Left anterior fascicular block  Moderate voltage criteria forLVH, may be normal variant ( R in aVL , Raad product )  Nonspecific ST and T wave abnormality  Abnormal ECG  When compared with ECG of 17-NOV-2021 22:55,  Significant changes have occurred  Confirmed by Isaac JOHNSON, Reinier ([id number]) on 4/27/2022 11:44:25 AM (04-25-22 @ 14:03)    < from: Xray Chest 1 View- PORTABLE-Urgent (Xray Chest 1 View- PORTABLE-Urgent .) (04.25.22 @ 15:18) >    ACC: [id number] EXAM:  XR CHEST PORTABLE URGENT 1V                          PROCEDURE DATE:  04/25/2022          INTERPRETATION:  Shortness of breath.    AP chest. Prior 11/21/2021. Heart magnified by projection.  no   consolidation or effusion. Degenerative change right shoulder and old   fracture proximal right humerus.    IMPRESSION: No acute findings.    --- End of Report ---            ARIANA KHANNA MD; Attending Radiologist  This document has been electronically signed. Apr 25 2022  4:28PM    < end of copied text >  < from: Transthoracic Echocardiogram (03.08.22 @ 07:48) >    Patient name: LINDSAY SALDAÑA  YOB: 1924   Age: 89 (F)   MR#: [id number]  Study Date: 3/8/2022  Location: O/[facility]  Study quality: Technically good  Referring Physician: Khari Haynes MD / Georges Villarreal MD  Blood Pressure: 124/64 mmHg  Height: 145 cm  Weight: 42 kg  BSA: 1.3 m2  Heart Rate: 59 mmHg  ------------------------------------------------------------------------  PROCEDURE: Transthoracic echocardiogram with 2-D, M-Mode  and complete spectral and color flow Doppler.  INDICATION: Nonrheumatic aortic (valve) stenosis (I35.0)  ------------------------------------------------------------------------  MEASUREMENTS: (See below)  ------------------------------------------------------------------------  OBSERVATIONS:  Mitral Valve: There is extensive mitral annular, leaflets  and subvalvular calcification. Mild mitral regurgitation.  The peak/mean gradients= 4/1mmHg (HR= 61bpm).  Aortic Root: Aortic Root: 2.9 cm.  Ascending Aorta: 3.1 cm.  LVOT diameter: 2 cm.  Aortic Valve: Calcified trileaflet aortic valve with  decreased opening.  There is LVOT calcification. Peak transaortic valve  gradient equals 61 mm Hg, mean transaortic valve gradient  equals 29 mm Hg, estimated aortic valve area equals 0.9  sqcm (by continuity equation), aortic valve velocity time  integral equals 88 cm, the DVI= 0.27, consistent with  moderate-severe aortic stenosis. Minimal aortic  regurgitation.  Peak left ventricular outflow tract  gradient equals 4 mm Hg, mean gradient is equal to 3 mm Hg,  LVOT velocity time integral equals 24 cm.  Left Atrium: Severely dilated left atrium.  LA volume index  = 91 cc/m2.  Left Ventricle: The left ventricular function is increased.   The LVEF= 65-70%. No regional wall motion abnormalities.  Normal left ventricular size with moderate concentric  hypertrophy.  Right Heart: Normal right atrium. Normal right ventricular  size and function. Normal tricuspid valve. Moderate  tricuspid regurgitation. Normal pulmonic valve. No pulmonic  regurgitation.  Pericardium/PleuraThere is a trace pericardial effusion.  Hemodynamic: The estimated right atrial pressure is normal.  Estimated right ventricular systolic pressure equals 36 mm  Hg, assuming right atrial pressure equals 5 mm Hg,  consistent with borderline pulmonary hypertension.  ------------------------------------------------------------------------  CONCLUSIONS:  1. Calcified trileaflet aortic valve with decreased  opening. Peak transaortic valve gradient equals 61 mm Hg,  mean transaortic valve gradient equals 29 mm Hg, estimated  aortic valve area equals 0.9 sqcm (by continuity equation),  aortic valve velocity time integral equals 88 cm, the DVI=  0.27, consistent with moderate-severe aortic stenosis. No  aortic valve regurgitation seen.  *** No previous Echo exam.  ------------------------------------------------------------------------  PROCEDURE DESCRIPTION: Transthoracic echocardiogram with  2-D, M-Mode and complete spectral and color flow Doppler.  ------------------------------------------------------------------------  ECHOCARDIOGRAPHIC EXAMINATION:  AORTIC ROOT:  Aortic Root (Leaflet): 2.9 cm  Aortic Root Index: 1.0  Aortic Tubular: 3.1 cm  LEFT ATRIUM:  AP Dimensions: 4.1 cm  LA Volume Index: 91.00 cc/sqm  LA Volume: 117.8 cc  LEFT VENTRICLE:  LVIDd: 4.1 cm  LVIDs: 2.6 cm  Fraction Short: 37 %  IVS: 1.49  ILWT: 1.4 cm  RWT: 0.70  LV Mass: 234.0 gm  LV Mass Index: 180 gm/sqm  EF (Visual Estimate): 65-70%  HR and BP:  HR: 59 bpm  BP: 124/64  BSA: 1.30  ------------------------------------------------------------------------  HEMODYNAMICS:  LA Pressure Estimate: < 20  PAS: 36  IVRT: 106 ms  ------------------------------------------------------------------------  COLOR FLOW and SPECIAL DOPPLER:  AORTIC VALVE:  AV Peak Velocity: 3.9 M/sec  AV Peak Gradient: 60 mm Hg  AV Mean Gradient: 29 mm Hg  Valve Area: 0.9 sqcm  LVOT:  LVOT Velocity: 1.0 M/sec  LVOT Diameter: 2.0 cm  LVOT Peak Gradient Rest: 4 mm Hg  LVOT Mean Gradient Rest: 3 mm Hg  ------------------------------------------------------------------------  DIASTOLIC FUNCTION:  DT:303 ms  e' Septal: 5.0 cm/s  E/e' Septal: 20.0  e' Lateral: 6.0 cm/s  E/e' Lateral: 16.6  MV E wave: 1.0 m/s  ------------------------------------------------------------------------  Confirmed on  3/8/2022 - 10:15:38 by Aye Mckeon M.D.  ------------------------------------------------------------------------    < end of copied text >

## 2022-04-27 NOTE — PROGRESS NOTE ADULT - PROBLEM SELECTOR PLAN 1
Chest Pressure/ YOUNG likely 2/2 Severe AS.  + Troponin ,likely 2/2 Demand ischemia, also 2/2 BRADY with decrease clearance.  -Serial CPK/MB/Troponin -q 8 hrs   -pt had Recent ECHO March 2022  -Cardiology-Dr Mukherjee follow up   Awaiting Transfer to [facility] for TAVR eval.  On Toprol  mg daily Chest Pressure/ YOUNG likely 2/2 Severe AS.  + Troponin ,likely 2/2 Demand ischemia, also 2/2 BRADY with decrease clearance.  -Serial CPK/MB/Troponin -q 8 hrs   -pt had Recent ECHO March 2022  -Cardiology-Dr Gray  follow up   Awaiting Transfer to [facility] for TAVR eval.-Pt is a poor candidate 2/2 BRADY  On Toprol  mg daily

## 2022-04-27 NOTE — PROGRESS NOTE ADULT - ATTENDING COMMENTS
89 female  PMH Afib on Xarelto, CHF, HTN, GERD, osteoarthritis, anxiety, iron deficiency anemia presents to ER with daughter c/o chest pain, left sided, radiating to left arm, shortness of breath, worse with exertion for the past few days and generalized weakness.   Pt seen, examined, case & care plan d/w pt, residents at detail.  D/W Dtr  at detail.  D/W Cardiologist -DR Gray  at detail.   - NO IVF ,   PO diet  AM labs   D/W DR Granados who d/w Dr Haynes -Plan for transfer to [facility] for TAVR eval when bed available if stable Cr .  Palliative care eval.   Total care time is 45 minutes.

## 2022-04-27 NOTE — PROGRESS NOTE ADULT - ASSESSMENT
89 female  PMH Afib on Xarelto, CHF, HTN, GERD, osteoarthritis, anxiety, iron deficiency anemia presents to ER with daughter c/o chest pain, left sided, radiating to left arm, shortness of breath, worse with exertion for the past few days and generalized weakness,     A fib, sever AS, HTN, HLD   - p/w YOUNG, chest tightness and arm pain   - Echocardiogram 03/2022 demonstrates an NIELS of 0.9 sq cm, a DVI of 0.27, peak and mean gradients of 61 and 29 mmHg, and an AoV of 3.9 m/s; as per our review with Dr Aye Mckeon, Director of Structural Heart Echocardiography, these findings are consistent with moderate to severe AS., mild MR, EF= 65-70%, mod conc LVH, mod TR, borderline pulm HTN  - Pt initially declined TAVR, contacted [facility] and is now accepted for transfer to [facility] for structural heart evaluation (accepting physician Dr. Haynes) for possible valvuloplasty, pending bed availability  - Serum Pro-Brain Natriuretic Peptide: [id number]   - CXR clear   - Continue Lasix 40 mg PO every other day. Assess volume status daily  - Strict intake and output  - may need IVF as she is not eating much    - Troponin: <-108.5, <-124.2, <-116.5, <-86.9, elevated likely to reduced renal clearance from BRADY, no need to trend further   - EKG showed A fib   - No signs of acute ischemia      - A fib rate in 60's on tele, would continue monitoring   - Continue Cardizem po 120 qd, Digoxin, Toprol 100 mg   - Continue Xarelto  - Would like to keep HR close to 60s given severe AS     - Trend creatinine. Patient baseline in 1, now 1.5, improving     - Monitor and replete lytes, keep K>4, Mg>2.  - Will continue to follow.    Becka Haider, [facility]  Nurse Practitioner - Cardiology   Spectra #[phone]

## 2022-04-27 NOTE — PROGRESS NOTE ADULT - PROBLEM SELECTOR PLAN 4
Chronic, EKG showing rate-controlled AFib  - Continue digoxin 125 mcg every other day, Dig level 1.3 on admission  - Continue Toprol  mg daily, Cardizem 120 mg daily with hold parameters  - Continue Xarelto 15 mg daily

## 2022-04-27 NOTE — PROGRESS NOTE ADULT - PROBLEM SELECTOR PLAN 2
Pt presenting with increased dyspnea on exertion, chest discomfort for 3 days  - Likely 2/2 symptomatic aortic stenosis  - Prior echo from March 2022 showing LVEF of 65-70%, moderate to severe aortic stenosis  - Cardiology Dr. Alegre consulted in ED, pt accepted for transfer to [facility] for structural heart evaluation (accepting physician Dr. Haynes)  - As per cardio, pt is poor candidate for TAVR but may benefit from repeat structural heart evaluation and possible BAV  - Decrease Lasix to 40 mg every other day-HOLD as d/w DR MARIELY Mukherjee.  - Strict Is/Os, daily weights, fluid restriction to 1 L daily.  on Toprol  mg daily Pt presenting with increased dyspnea on exertion, chest discomfort for 3 days  - Likely 2/2 symptomatic aortic stenosis  - Prior echo from March 2022 showing LVEF of 65-70%, moderate to severe aortic stenosis  - Cardiology Dr. Alegre consulted in ED, pt accepted for transfer to [facility] for structural heart evaluation (accepting physician Dr. Haynes)  - As per cardio, pt is poor candidate for TAVR but may benefit from repeat structural heart evaluation and possible BAV  - Decrease Lasix to 40 mg every other day-HOLD as d/w DR MARIELY Mukherjee.  - Strict Is/Os, daily weights, fluid restriction to 1 L daily.  on Toprol  mg daily  Small dose of Xanax x 1 today as d/w DR clement for Anxiety

## 2022-04-27 NOTE — PROGRESS NOTE ADULT - PROBLEM SELECTOR PLAN 3
BRADY/CKD 2- Cr on admission 2.0, baseline appears to be around 1.0- slowly improving   - Likely secondary to decreased effective circulating volume due to aortic stenosis vs overdiuresis  - Trend renal indices  - Avoid nephrotoxic agents and IV contrast agents if possible  - Received Lasix 20 mg IVP in ED  - Decrease Lasix to 40 mg every other day - HOLD for now per cardio   - Avoid IVF at this time due to AS BRADY/CKD 2- Cr on admission 2.0, baseline appears to be around 1.0- slowly improving   - Likely secondary to decreased effective circulating volume due to aortic stenosis vs overdiuresis  - Trend renal indices  - Avoid nephrotoxic agents and IV contrast agents if possible  - Received Lasix 20 mg IVP in ED  - HOLD  Lasix to 40 mg every other day - as per cardio   - Avoid IVF at this time due to AS

## 2022-04-27 NOTE — PROGRESS NOTE ADULT - PROBLEM SELECTOR PLAN 5
Continue Toprol  mg, Cardizem 120 mg daily, Lasix 40 mg QOD with hold parameters  - Hold spironolactone 50 mg daily for now to avoid overdiuresis, can resume in AM if needed  - Monitor routine hemodynamics

## 2022-04-28 LAB
ALBUMIN SERPL ELPH-MCNC: 3.3 G/DL — SIGNIFICANT CHANGE UP (ref 3.3–5)
ALP SERPL-CCNC: 69 U/L — SIGNIFICANT CHANGE UP (ref 40–120)
ALT FLD-CCNC: 16 U/L — SIGNIFICANT CHANGE UP (ref 12–78)
ANION GAP SERPL CALC-SCNC: 8 MMOL/L — SIGNIFICANT CHANGE UP (ref 5–17)
AST SERPL-CCNC: 18 U/L — SIGNIFICANT CHANGE UP (ref 15–37)
BASOPHILS # BLD AUTO: 0.01 K/UL — SIGNIFICANT CHANGE UP (ref 0–0.2)
BASOPHILS NFR BLD AUTO: 0.2 % — SIGNIFICANT CHANGE UP (ref 0–2)
BILIRUB SERPL-MCNC: 0.5 MG/DL — SIGNIFICANT CHANGE UP (ref 0.2–1.2)
BUN SERPL-MCNC: 37 MG/DL — HIGH (ref 7–23)
CALCIUM SERPL-MCNC: 9.6 MG/DL — SIGNIFICANT CHANGE UP (ref 8.5–10.1)
CHLORIDE SERPL-SCNC: 108 MMOL/L — SIGNIFICANT CHANGE UP (ref 96–108)
CO2 SERPL-SCNC: 23 MMOL/L — SIGNIFICANT CHANGE UP (ref 22–31)
CREAT SERPL-MCNC: 1.3 MG/DL — SIGNIFICANT CHANGE UP (ref 0.5–1.3)
EGFR: 37 ML/MIN/1.73M2 — LOW
EOSINOPHIL # BLD AUTO: 0.07 K/UL — SIGNIFICANT CHANGE UP (ref 0–0.5)
EOSINOPHIL NFR BLD AUTO: 1.2 % — SIGNIFICANT CHANGE UP (ref 0–6)
GLUCOSE SERPL-MCNC: 143 MG/DL — HIGH (ref 70–99)
HCT VFR BLD CALC: 40.3 % — SIGNIFICANT CHANGE UP (ref 34.5–45)
HGB BLD-MCNC: 13.5 G/DL — SIGNIFICANT CHANGE UP (ref 11.5–15.5)
IMM GRANULOCYTES NFR BLD AUTO: 0.2 % — SIGNIFICANT CHANGE UP (ref 0–1.5)
LYMPHOCYTES # BLD AUTO: 1.23 K/UL — SIGNIFICANT CHANGE UP (ref 1–3.3)
LYMPHOCYTES # BLD AUTO: 20.9 % — SIGNIFICANT CHANGE UP (ref 13–44)
MCHC RBC-ENTMCNC: 29.4 PG — SIGNIFICANT CHANGE UP (ref 27–34)
MCHC RBC-ENTMCNC: 33.5 GM/DL — SIGNIFICANT CHANGE UP (ref 32–36)
MCV RBC AUTO: 87.8 FL — SIGNIFICANT CHANGE UP (ref 80–100)
MONOCYTES # BLD AUTO: 0.54 K/UL — SIGNIFICANT CHANGE UP (ref 0–0.9)
MONOCYTES NFR BLD AUTO: 9.2 % — SIGNIFICANT CHANGE UP (ref 2–14)
NEUTROPHILS # BLD AUTO: 4.02 K/UL — SIGNIFICANT CHANGE UP (ref 1.8–7.4)
NEUTROPHILS NFR BLD AUTO: 68.3 % — SIGNIFICANT CHANGE UP (ref 43–77)
NRBC # BLD: 0 /100 WBCS — SIGNIFICANT CHANGE UP (ref 0–0)
PLATELET # BLD AUTO: 206 K/UL — SIGNIFICANT CHANGE UP (ref 150–400)
POTASSIUM SERPL-MCNC: 4.1 MMOL/L — SIGNIFICANT CHANGE UP (ref 3.5–5.3)
POTASSIUM SERPL-SCNC: 4.1 MMOL/L — SIGNIFICANT CHANGE UP (ref 3.5–5.3)
PROT SERPL-MCNC: 6.6 G/DL — SIGNIFICANT CHANGE UP (ref 6–8.3)
RBC # BLD: 4.59 M/UL — SIGNIFICANT CHANGE UP (ref 3.8–5.2)
RBC # FLD: 14.1 % — SIGNIFICANT CHANGE UP (ref 10.3–14.5)
SODIUM SERPL-SCNC: 139 MMOL/L — SIGNIFICANT CHANGE UP (ref 135–145)
WBC # BLD: 5.88 K/UL — SIGNIFICANT CHANGE UP (ref 3.8–10.5)
WBC # FLD AUTO: 5.88 K/UL — SIGNIFICANT CHANGE UP (ref 3.8–10.5)

## 2022-04-28 PROCEDURE — 99232 SBSQ HOSP IP/OBS MODERATE 35: CPT

## 2022-04-28 RX ORDER — SENNA PLUS 8.6 MG/1
2 TABLET ORAL
Qty: 0 | Refills: 0 | DISCHARGE
Start: 2022-04-28

## 2022-04-28 RX ADMIN — ZOLPIDEM TARTRATE 5 MILLIGRAM(S): 10 TABLET ORAL at 21:44

## 2022-04-28 RX ADMIN — FAMOTIDINE 20 MILLIGRAM(S): 10 INJECTION INTRAVENOUS at 11:07

## 2022-04-28 RX ADMIN — SENNA PLUS 2 TABLET(S): 8.6 TABLET ORAL at 21:44

## 2022-04-28 RX ADMIN — RIVAROXABAN 15 MILLIGRAM(S): KIT at 17:23

## 2022-04-28 RX ADMIN — PANTOPRAZOLE SODIUM 40 MILLIGRAM(S): 20 TABLET, DELAYED RELEASE ORAL at 11:07

## 2022-04-28 RX ADMIN — Medication 125 MICROGRAM(S): at 11:07

## 2022-04-28 NOTE — PROGRESS NOTE ADULT - ASSESSMENT
89 y/o female with PMHx of Afib on Xarelto, CHF, HTN, GERD, osteoarthritis, anxiety, iron deficiency anemia who presents with 3 days of worsening dyspnea on exertion along with chest discomfort, likely secondary to symptomatic aortic stenosis.

## 2022-04-28 NOTE — PROGRESS NOTE ADULT - SUBJECTIVE AND OBJECTIVE BOX
[facility] -- Sage Monzon, Jessica Gray, Lonny Alegre, Isabella Handley: Office # [phone]    Follow Up:  chest pain, valvular disease    Subjective/Observations: Patient seen and examined. Patient awake, alert, resting in bed, does not appear to be in any distress, non orthopneic. Tolerating O2 via nasal cannula.     REVIEW OF SYSTEMS: All other review of systems are negative unless indicated above    PAST MEDICAL & SURGICAL HISTORY:  Chronic atrial fibrillation    CHF, chronic    Hypertension    Hypertension    Osteoarthritis    Dementia    GERD (gastroesophageal reflux disease)    Iron deficiency anemia    History of cholecystectomy    MEDICATIONS  (STANDING):  digoxin     Tablet 125 MICROGram(s) Oral <User Schedule>  diltiazem    milliGRAM(s) Oral daily  famotidine    Tablet 20 milliGRAM(s) Oral daily  metoprolol succinate  milliGRAM(s) Oral daily  pantoprazole  Injectable 40 milliGRAM(s) IV Push daily  rivaroxaban 15 milliGRAM(s) Oral with dinner  senna 2 Tablet(s) Oral at bedtime    MEDICATIONS  (PRN):  zolpidem 5 milliGRAM(s) Oral at bedtime PRN Insomnia    Allergies  No Known Allergies    Vital Signs Last 24 Hrs  T(C): 36.3 (28 Apr 2022 11:02), Max: 36.4 (27 Apr 2022 19:41)  T(F): 97.4 (28 Apr 2022 11:02), Max: 97.6 (27 Apr 2022 19:41)  HR: 77 (28 Apr 2022 11:02) (74 - 89)  BP: 147/61 (28 Apr 2022 11:02) (95/64 - 147/61)  BP(mean): --  RR: 18 (28 Apr 2022 11:02) (18 - 19)  SpO2: 99% (28 Apr 2022 11:02) (96% - 99%)  I&O's Summary        TELE: A fib 70-80s, tachy this am to 130-150s, now improved   PHYSICAL EXAM:  Constitutional: NAD, awake and alert, Frail   HEENT: Moist Mucous Membranes, Anicteric  Pulmonary: Non-labored, breath sounds are clear bilaterally, No wheezing, rales or rhonchi  Cardiovascular: Irregular, S1 and S2, + murmurs, rubs, gallops or clicks  Gastrointestinal:  soft, nontender, nondistended   Lymph: No peripheral edema. No lymphadenopathy.   Skin: No visible rashes or ulcers.  Psych:  Mood & affect appropriate      LABS: All Labs Reviewed:                        13.5   5.88  )-----------( 206      ( 28 Apr 2022 09:03 )             40.3                         13.2   5.97  )-----------( 210      ( 27 Apr 2022 07:44 )             38.9                         13.3   6.33  )-----------( 231      ( 26 Apr 2022 07:56 )             39.0     28 Apr 2022 09:03    139    |  108    |  37     ----------------------------<  143    4.1     |  23     |  1.30   27 Apr 2022 07:44    136    |  102    |  44     ----------------------------<  110    3.2     |  26     |  1.50   26 Apr 2022 07:56    139    |  104    |  54     ----------------------------<  103    3.5     |  24     |  1.80     Ca    9.6        28 Apr 2022 09:03  Ca    9.3        27 Apr 2022 07:44  Ca    9.1        26 Apr 2022 07:56  Mg     3.2       25 Apr 2022 14:34    TPro  6.6    /  Alb  3.3    /  TBili  0.5    /  DBili  x      /  AST  18     /  ALT  16     /  AlkPhos  69     28 Apr 2022 09:03  TPro  6.6    /  Alb  3.4    /  TBili  0.6    /  DBili  x      /  AST  22     /  ALT  17     /  AlkPhos  74     27 Apr 2022 07:44  TPro  7.1    /  Alb  3.5    /  TBili  0.7    /  DBili  x      /  AST  21     /  ALT  15     /  AlkPhos  76     26 Apr 2022 07:56   LIVER FUNCTIONS - ( 28 Apr 2022 09:03 )  Alb: 3.3 g/dL / Pro: 6.6 g/dL / ALK PHOS: 69 U/L / ALT: 16 U/L / AST: 18 U/L / GGT: x           Troponin I, High Sensitivity Result: 108.5 ng/L (04-26-22 @ 07:56)  Troponin I, High Sensitivity Result: 124.2 ng/L (04-26-22 @ 00:02)  Troponin I, High Sensitivity Result: 116.5 ng/L (04-25-22 @ 21:34)  Troponin I, High Sensitivity Result: 86.9 ng/L (04-25-22 @ 14:34)    12 Lead ECG:   Ventricular Rate 86 BPM    QRS Duration 112 ms    Q-T Interval 376 ms    QTC Calculation(Bazett) 449 ms    R Axis -53 degrees    T Axis 136 degrees    Diagnosis Line Atrial fibrillation  Left anterior fascicular block  Moderate voltage criteria forLVH, may be normal variant ( R in aVL , Raad product )  Nonspecific ST and T wave abnormality  Abnormal ECG  When compared with ECG of 17-NOV-2021 22:55,  Significant changes have occurred  Confirmed by Reinier Gray MD ([id number]) on 4/27/2022 11:44:25 AM (04-25-22 @ 14:03)      Patient name: LINDSAY SALDAÑA  YOB: 1924   Age: 89 (F)   MR#: [id number]  Study Date: 3/8/2022  Location: O/PSonographer: Domenica Thomas RDCS  Study quality: Technically good  Referring Physician: Khari Haynes MD / Georges Villarreal MD  Blood Pressure: 124/64 mmHg  Height: 145 cm  Weight: 42 kg  BSA: 1.3 m2  Heart Rate: 59 mmHg  ------------------------------------------------------------------------  PROCEDURE: Transthoracic echocardiogram with 2-D, M-Mode  and complete spectral and color flow Doppler.  INDICATION: Nonrheumatic aortic (valve) stenosis (I35.0)  ------------------------------------------------------------------------  MEASUREMENTS: (See below)  ------------------------------------------------------------------------  OBSERVATIONS:  Mitral Valve: There is extensive mitral annular, leaflets  and subvalvular calcification. Mild mitral regurgitation.  The peak/mean gradients= 4/1mmHg (HR= 61bpm).  Aortic Root: Aortic Root: 2.9 cm.  Ascending Aorta: 3.1 cm.  LVOT diameter: 2 cm.  Aortic Valve: Calcified trileaflet aortic valve with  decreased opening.  There is LVOT calcification. Peak transaortic valve  gradient equals 61 mm Hg, mean transaortic valve gradient  equals 29 mm Hg, estimated aortic valve area equals 0.9  sqcm (by continuity equation), aortic valve velocity time  integral equals 88 cm, the DVI= 0.27, consistent with  moderate-severe aortic stenosis. Minimal aortic  regurgitation.  Peak left ventricular outflow tract  gradient equals 4 mm Hg, mean gradient is equal to 3 mm Hg,  LVOT velocity time integral equals 24 cm.  Left Atrium: Severely dilated left atrium.  LA volume index  = 91 cc/m2.  Left Ventricle: The left ventricular function is increased.   The LVEF= 65-70%. No regional wall motion abnormalities.  Normal left ventricular size with moderate concentric  hypertrophy.  Right Heart: Normal right atrium. Normal right ventricular  size and function. Normal tricuspid valve. Moderate  tricuspid regurgitation. Normal pulmonic valve. No pulmonic  regurgitation.  Pericardium/PleuraThere is a trace pericardial effusion.  Hemodynamic: The estimated right atrial pressure is normal.  Estimated right ventricular systolic pressure equals 36 mm  Hg, assuming right atrial pressure equals 5 mm Hg,  consistent with borderline pulmonary hypertension.  ------------------------------------------------------------------------  CONCLUSIONS:  1. Calcified trileaflet aortic valve with decreased  opening. Peak transaortic valve gradient equals 61 mm Hg,  mean transaortic valve gradient equals 29 mm Hg, estimated  aortic valve area equals 0.9 sqcm (by continuity equation),  aortic valve velocity time integral equals 88 cm, the DVI=  0.27, consistent with moderate-severe aortic stenosis. No  aortic valve regurgitation seen.  *** No previous Echo exam.  ------------------------------------------------------------------------  PROCEDURE DESCRIPTION: Transthoracic echocardiogram with  2-D, M-Mode and complete spectral and color flow Doppler.  ------------------------------------------------------------------------  ECHOCARDIOGRAPHIC EXAMINATION:  AORTIC ROOT:  Aortic Root (Leaflet): 2.9 cm  Aortic Root Index: 1.0  Aortic Tubular: 3.1 cm  LEFT ATRIUM:  AP Dimensions: 4.1 cm  LA Volume Index: 91.00 cc/sqm  LA Volume: 117.8 cc  LEFT VENTRICLE:  LVIDd: 4.1 cm  LVIDs: 2.6 cm  Fraction Short: 37 %  IVS: 1.49  ILWT: 1.4 cm  RWT: 0.70  LV Mass: 234.0 gm  LV Mass Index: 180 gm/sqm  EF (Visual Estimate): 65-70%  HR and BP:  HR: 59 bpm  BP: 124/64  BSA: 1.30  ------------------------------------------------------------------------  HEMODYNAMICS:  LA Pressure Estimate: < 20  PAS: 36  IVRT: 106 ms  ------------------------------------------------------------------------  COLOR FLOW and SPECIAL DOPPLER:  AORTIC VALVE:  AV Peak Velocity: 3.9 M/sec  AV Peak Gradient: 60 mm Hg  AV Mean Gradient: 29 mm Hg  Valve Area: 0.9 sqcm  LVOT:  LVOT Velocity: 1.0 M/sec  LVOT Diameter: 2.0 cm  LVOT Peak Gradient Rest: 4 mm Hg  LVOT Mean Gradient Rest: 3 mm Hg  ------------------------------------------------------------------------  DIASTOLIC FUNCTION:  DT:303 ms  e' Septal: 5.0 cm/s  E/e' Septal: 20.0  e' Lateral: 6.0 cm/s  E/e' Lateral: 16.6  MV E wave: 1.0 m/s  ------------------------------------------------------------------------  Confirmed on  3/8/2022 - 10:15:38 by Aye Mckeon M.D.  ------------------------------------------------------------------------

## 2022-04-28 NOTE — PROGRESS NOTE ADULT - ASSESSMENT
89 female  PMH Afib on Xarelto, CHF, HTN, GERD, osteoarthritis, anxiety, iron deficiency anemia presents to ER with daughter c/o chest pain, left sided, radiating to left arm, shortness of breath, worse with exertion for the past few days and generalized weakness,     A fib, sever AS, HTN, HLD   - p/w YOUNG, chest tightness and arm pain,   - Echocardiogram 03/2022 demonstrates an NIELS of 0.9 sq cm, a DVI of 0.27, peak and mean gradients of 61 and 29 mmHg, and an AoV of 3.9 m/s; as per our review with Dr Aye Mckeon, Director of Structural Heart Echocardiography, these findings are consistent with moderate to severe AS., mild MR, EF= 65-70%, mod conc LVH, mod TR, borderline pulm HTN  - Pt initially declined TAVR, contacted [facility] and is now accepted for transfer to [facility] for structural heart evaluation (accepting physician Dr. Haynes) for possible valvuloplasty, pending bed availability  - Serum Pro-Brain Natriuretic Peptide: 9188   - CXR clear   - Continue Lasix 40 mg PO every other day. Assess volume status daily  - Strict intake and output  - may need IVF as she is not eating much    - Troponin down trending, elevated likely to reduced renal clearance from BRADY, no need to trend further   - EKG showed A fib   - No signs of acute ischemia      - A fib rate in 70-s0's on tele, had RVR this am to 130s, now improved   - Continue Cardizem po 120 qd, Digoxin, Toprol 100 mg   - Continue Xarelto  - Would like to keep HR close to 60s given severe AS     - Trend creatinine. BRADY improving     - Monitor and replete lytes, keep K>4, Mg>2.  - Will continue to follow.    Christiano Love, MS FNP, AGAP  Nurse Practitioner- Cardiology   Spectra #[phone]

## 2022-04-28 NOTE — PHARMACOTHERAPY INTERVENTION NOTE - COMMENTS
CHF Patient Transitions of Care Counseling  Counseled by ([facility] name): Leon Denise, Pharm. D.   Person(s) counseled: Patient, at bedside  Counseling materials provided/counseling aids used:  [email or web address] plan, Micromedex Ca[place]otes  Time spent Counseling: 15 minutes  Counseling provided according to [place] guidelines including side effects  Notes:

## 2022-04-28 NOTE — PROGRESS NOTE ADULT - PROBLEM SELECTOR PLAN 2
Pt presenting with increased dyspnea on exertion, chest discomfort for 3 days  - Likely 2/2 symptomatic aortic stenosis  - Prior echo from March 2022 showing LVEF of 65-70%, moderate to severe aortic stenosis  - Cardiology Dr. Alegre consulted in ED, pt accepted for transfer to [facility] for structural heart evaluation (accepting physician Dr. Haynes)  - As per cardio, pt is poor candidate for TAVR but may benefit from repeat structural heart evaluation and possible BAV  - Decrease Lasix to 40 mg every other day-HOLD as d/w DR MARIELY Mukherjee.  - Strict Is/Os, daily weights, fluid restriction to 1 L daily.  - on Toprol  mg daily  - Small dose of Xanax x1 on 4/27 d/w DR clement for Anxiety

## 2022-04-28 NOTE — PROGRESS NOTE ADULT - PROBLEM SELECTOR PLAN 3
BRADY/CKD 2- Cr on admission 2.0, baseline appears to be around 1.0- slowly improving - 1.3 this AM  - Likely secondary to decreased effective circulating volume due to aortic stenosis vs overdiuresis  - Trend renal indices  - Avoid nephrotoxic agents and IV contrast agents if possible  - Received Lasix 20 mg IVP in ED  - Lasix 40 mg every other day - as per cardio   - Avoid IVF at this time due to AS BRADY/CKD 2- Cr on admission 2.0, baseline appears to be around 1.0- slowly improving - 1.3 this AM  - Likely secondary to decreased effective circulating volume due to aortic stenosis vs overdiuresis  - BMP in AM   - Avoid nephrotoxic agents and IV contrast agents if possible  - Received Lasix 20 mg IVP in ED  - Lasix 40 mg every other day -  HOLD as per cardio   - Avoid IVF at this time due to AS

## 2022-04-28 NOTE — PROGRESS NOTE ADULT - ATTENDING COMMENTS
89 female  PMH Afib on Xarelto, CHF, HTN, GERD, osteoarthritis, anxiety, iron deficiency anemia presents to ER with daughter c/o chest pain, left sided, radiating to left arm, shortness of breath, worse with exertion for the past few days and generalized weakness.   Pt seen, examined, case & care plan d/w pt, residents at detail.  D/W Dtr  at detail.  D/W Cardiologist -DR Gray  at detail.   - NO IVF ,   PO diet  AM labs   D/W DR Granados & Dtr at detail, -Plan for transfer to [facility] for TAVR eval when bed available if stable Cr   -D/W Transfer center x 2 -No Tele Beds available .  Palliative care eval.   Total care time is 40 minutes.

## 2022-04-28 NOTE — PROGRESS NOTE ADULT - SUBJECTIVE AND OBJECTIVE BOX
Patient is a 89y old  Female who presents with a chief complaint of Shortness of breath (28 Apr 2022 11:19)    HPI:  This is a 89 y/o female with PMHx of Afib on Xarelto, CHF, HTN, GERD, osteoarthritis, anxiety, iron deficiency anemia who presents with 3 days of worsening dyspnea on exertion along with chest discomfort. Hx was obtained from pt's daughter, as hx was limited due to patient's dementia. At baseline, pt lives alone with help from an aide, but is able to perform most of her ADLs and cook on her own without difficulty. However, pt has had increasing difficulty with ADLs due to her shortness of breath. Pt also has midline back pain radiating to both arms and some chest discomfort that occurs when the patient is short of breath. She has not had any fevers, chills, lightheadedness, palpitations, abd pain, constipation/diarrhea, dysuria, or pain/swelling in the legs. Pt has no other complaints or concerns at this time, and only current symptom is L-sided back/arm pain.    ED course:  T 95.9, , /72, RR 22, SpO2 100% on 2L NC  Labs significant for BUN/Cr 53/2.00, BNP 9188, TropI 86.9  CXR negative for acute pathology  EKG AFib, rate 86, LAFB, nonspecific ST/T wave abnormality  Given Lasix 20 mg IVP, Cardizem 10 mg IVP in ED   (25 Apr 2022 18:07)    INTERVAL HPI:  4/26/22: Patient was seen and examined at bedside. States she is feeling well and her breathing has improved. Denies any chest pain or palpitations. Transfer planning to [facility].   4/27/22: Patient was seen and examined at bedside. Denies any acute complaints. Transfer to [facility], awaiting bed.   4/28/22: Patient was seen and examined at bedside. States she is feeling well. No acute complaints. Transfer to [facility], awaiting bed.     OVERNIGHT EVENTS:  No acute events overnight.     Home Medications:  Ambien 10 mg oral tablet: 1 tab(s) orally once a day (at bedtime) (25 Apr 2022 20:01)  famotidine 20 mg oral tablet: 1 tab(s) orally once a day (26 Apr 2022 22:42)  Lasix 40 mg oral tablet: 1 tab(s) orally every other day (25 Apr 2022 20:01)  memantine 28 mg oral capsule, extended release: 1 cap(s) orally once a day (25 Apr 2022 20:01)  olopatadine 0.2% ophthalmic solution: 1 drop(s) to each affected eye once a day, As Needed (25 Apr 2022 20:01)  pantoprazole 20 mg oral delayed release tablet: 1 tab(s) orally once a day (25 Apr 2022 20:01)  rivaroxaban 15 mg oral tablet: 1 tab(s) orally once a day (25 Apr 2022 20:01)  Toprol- mg oral tablet, extended release: 1 tab(s) orally once a day (25 Apr 2022 20:01)  Vitamin D3 1250 mcg (50,000 intl units) oral capsule: 1 cap(s) orally once a week on Monday  (25 Apr 2022 20:01)      MEDICATIONS  (STANDING):  digoxin     Tablet 125 MICROGram(s) Oral <User Schedule>  diltiazem    milliGRAM(s) Oral daily  famotidine    Tablet 20 milliGRAM(s) Oral daily  metoprolol succinate  milliGRAM(s) Oral daily  pantoprazole  Injectable 40 milliGRAM(s) IV Push daily  rivaroxaban 15 milliGRAM(s) Oral with dinner  senna 2 Tablet(s) Oral at bedtime    MEDICATIONS  (PRN):  zolpidem 5 milliGRAM(s) Oral at bedtime PRN Insomnia      Allergies    No Known Allergies    Intolerances        Social History:  Lives at home alone, ambulates with walker, has aide to help with ADLs/IADLs  Denies alcohol, tobacco, recreational drug use  Vaccinated for COVID x 3 (Moderna) (25 Apr 2022 18:07)      REVIEW OF SYSTEMS: i am good   CONSTITUTIONAL: No fever, No chills, No fatigue, No myalgia, No Body ache, No Weakness  EYES: No eye pain,  No visual disturbances, No discharge, NO Redness  ENMT:  No ear pain, No nose bleed, No vertigo; No sinus pain, NO throat pain, No Congestion  NECK: No pain, No stiffness  RESPIRATORY: No cough, NO wheezing, No  hemoptysis, NO  shortness of breath  CARDIOVASCULAR: No chest pain, palpitations  GASTROINTESTINAL: No abdominal pain, NO epigastric pain. No nausea, No vomiting; No diarrhea, No constipation. [x  ] No BM  GENITOURINARY: No dysuria, No frequency, No urgency, No hematuria, NO incontinence  NEUROLOGICAL: No headaches, No dizziness, No numbness, No tingling, No tremors, No weakness  EXT: No Swelling, No Pain, No Edema  SKIN:  [ x ] No itching, burning, rashes, or lesions   MUSCULOSKELETAL: No joint pain ,No Jt swelling; No muscle pain, No back pain, No extremity pain  PSYCHIATRIC: No depression,  No anxiety,  No mood swings ,No difficulty sleeping at night  PAIN SCALE: [ x ] None  [  ] Other-  ROS Unable to obtain due to - [  ] Dementia  [  ] Lethargy [  ] Drowsy [  ] Sedated [  ] non verbal  REST OF REVIEW Of SYSTEM - [ x ] Normal     Vital Signs Last 24 Hrs  T(C): 36.3 (28 Apr 2022 11:02), Max: 36.4 (27 Apr 2022 19:41)  T(F): 97.4 (28 Apr 2022 11:02), Max: 97.6 (27 Apr 2022 19:41)  HR: 77 (28 Apr 2022 11:02) (74 - 89)  BP: 147/61 (28 Apr 2022 11:02) (95/64 - 147/61)  BP(mean): --  RR: 18 (28 Apr 2022 11:02) (18 - 19)  SpO2: 99% (28 Apr 2022 11:02) (96% - 99%)  Finger Stick          PHYSICAL EXAM: i am good, on 2L NC  GENERAL:  [ x ] NAD , [x  ] well appearing, [  ] Agitated, [  ] Drowsy,  [  ] Lethargy, [  ] confused   HEAD:  [ x ] Normal, [  ] Other  EYES:  [ x ] EOMI, [ x ] PERRLA, [x  ] conjunctiva and sclera clear normal, [  ] Other,  [  ] Pallor,[  ] Discharge  ENMT:  [x  ] Normal, [ x ] Moist mucous membranes, [x  ] Good dentition, [ x ] No Thrush  NECK:  [x  ] Supple, [x  ] No JVD, [x  ] Normal thyroid, [  ] Lymphadenopathy [  ] Other  CHEST/LUNG:  [ x ] Clear to auscultation bilaterally, [x  ] Breath Sounds equal B/L / Decrease, [x  ] poor effort  [x  ] No rales, [ x ] No rhonchi  [x  ]  No wheezing,   HEART:  [ x ] Regular rate and rhythm, [  ] tachycardia, [  ] Bradycardia,  [  ] irregular  [ x ] No murmurs, No rubs, No gallops, [  ] PPM in place (Mfr:  )  ABDOMEN:  [x  ] Soft, [x  ] Nontender, [  x] Nondistended, [ x ] No mass, [ x ] Bowel sounds present, [  ] obese  NERVOUS SYSTEM:  [x  ] Alert & Oriented X3, [ x ] Nonfocal  [  ] Confusion  [  ] Encephalopathic [  ] Sedated [  ] Unable to assess, [  ] Dementia [  ] Other-  EXTREMITIES: [x  ] 2+ Peripheral Pulses, No clubbing, No cyanosis,  [  ] edema B/L lower EXT. [  ] PVD stasis skin changes B/L Lower EXT, [  ] wound  LYMPH: No lymphadenopathy noted  SKIN:  [x  ] No rashes or lesions, [  ] Pressure Ulcers, [  ] ecchymosis, [  ] Skin Tears, [  ] Other    DIET: Diet, DASH/TLC:   Sodium & Cholesterol Restricted  1000mL Fluid Restriction ([id number]) (04-25-22 @ 17:44)      LABS:                        13.5   5.88  )-----------( 206      ( 28 Apr 2022 09:03 )             40.3     28 Apr 2022 09:03    139    |  108    |  37     ----------------------------<  143    4.1     |  23     |  1.30     Ca    9.6        28 Apr 2022 09:03    TPro  6.6    /  Alb  3.3    /  TBili  0.5    /  DBili  x      /  AST  18     /  ALT  16     /  AlkPhos  69     28 Apr 2022 09:03        Serum Pro-Brain Natriuretic Peptide: 9188 pg/mL (04-25-22 @ 14:34)      RADIOLOGY & ADDITIONAL TESTS:      HEALTH ISSUES - PROBLEM Dx:  Chronic atrial fibrillation    HTN (hypertension)    Dementia    GERD (gastroesophageal reflux disease)    Aortic stenosis    BRADY (acute kidney injury)    Allergic conjunctivitis, bilateral    Need for prophylactic measure    Chest pressure          Consultant(s) Notes Reviewed:  [ x ] YES     Care Discussed with [X] Consultants  [ x ] Patient  [ x ] Family- dtr  ] HCP [  ]   [  ] Social Service  [  x] RN, [  ] Physical Therapy,[  ] Palliative care team  DVT PPX: [  ] Lovenox, [  ] S C Heparin, [  ] Coumadin, [  x] Xarelto, [  ] Eliquis, [  ] Pradaxa, [  ] IV Heparin drip, [  ] SCD [  ] Contraindication 2 to GI Bleed,[  ] Ambulation [  ] Contraindicated 2 to  bleed [  ] Contraindicated 2 to Brain Bleed  Advanced directive: [  ] None, [  ] DNR/DNI [x] Prior MOLST  Patient is a 89y old  Female who presents with a chief complaint of Shortness of breath (28 Apr 2022 11:19)    HPI:  This is a 89 y/o female with PMHx of Afib on Xarelto, CHF, HTN, GERD, osteoarthritis, anxiety, iron deficiency anemia who presents with 3 days of worsening dyspnea on exertion along with chest discomfort. Hx was obtained from pt's daughter, as hx was limited due to patient's dementia. At baseline, pt lives alone with help from an aide, but is able to perform most of her ADLs and cook on her own without difficulty. However, pt has had increasing difficulty with ADLs due to her shortness of breath. Pt also has midline back pain radiating to both arms and some chest discomfort that occurs when the patient is short of breath. She has not had any fevers, chills, lightheadedness, palpitations, abd pain, constipation/diarrhea, dysuria, or pain/swelling in the legs. Pt has no other complaints or concerns at this time, and only current symptom is L-sided back/arm pain.    ED course:  T 95.9, , /72, RR 22, SpO2 100% on 2L NC  Labs significant for BUN/Cr 53/2.00, BNP 9188, TropI 86.9  CXR negative for acute pathology  EKG AFib, rate 86, LAFB, nonspecific ST/T wave abnormality  Given Lasix 20 mg IVP, Cardizem 10 mg IVP in ED   (25 Apr 2022 18:07)    INTERVAL HPI:  4/26/22: Patient was seen and examined at bedside. States she is feeling well and her breathing has improved. Denies any chest pain or palpitations. Transfer planning to [facility].   4/27/22: Patient was seen and examined at bedside. Denies any acute complaints. Transfer to [facility], awaiting bed.   4/28/22: Patient was seen and examined at bedside. States she is feeling well. No acute complaints. Transfer to [facility], awaiting bed.     OVERNIGHT EVENTS:  No acute events overnight.     Home Medications:  Ambien 10 mg oral tablet: 1 tab(s) orally once a day (at bedtime) (25 Apr 2022 20:01)  famotidine 20 mg oral tablet: 1 tab(s) orally once a day (26 Apr 2022 22:42)  Lasix 40 mg oral tablet: 1 tab(s) orally every other day (25 Apr 2022 20:01)  memantine 28 mg oral capsule, extended release: 1 cap(s) orally once a day (25 Apr 2022 20:01)  olopatadine 0.2% ophthalmic solution: 1 drop(s) to each affected eye once a day, As Needed (25 Apr 2022 20:01)  pantoprazole 20 mg oral delayed release tablet: 1 tab(s) orally once a day (25 Apr 2022 20:01)  rivaroxaban 15 mg oral tablet: 1 tab(s) orally once a day (25 Apr 2022 20:01)  Toprol- mg oral tablet, extended release: 1 tab(s) orally once a day (25 Apr 2022 20:01)  Vitamin D3 1250 mcg (50,000 intl units) oral capsule: 1 cap(s) orally once a week on Monday  (25 Apr 2022 20:01)      MEDICATIONS  (STANDING):  digoxin     Tablet 125 MICROGram(s) Oral <User Schedule>  diltiazem    milliGRAM(s) Oral daily  famotidine    Tablet 20 milliGRAM(s) Oral daily  metoprolol succinate  milliGRAM(s) Oral daily  pantoprazole  Injectable 40 milliGRAM(s) IV Push daily  rivaroxaban 15 milliGRAM(s) Oral with dinner  senna 2 Tablet(s) Oral at bedtime    MEDICATIONS  (PRN):  zolpidem 5 milliGRAM(s) Oral at bedtime PRN Insomnia      Allergies    No Known Allergies    Intolerances        Social History:  Lives at home alone, ambulates with walker, has aide to help with ADLs/IADLs  Denies alcohol, tobacco, recreational drug use  Vaccinated for COVID x 3 (Moderna) (25 Apr 2022 18:07)      REVIEW OF SYSTEMS: i am good   CONSTITUTIONAL: No fever, No chills, No fatigue, No myalgia, No Body ache, No Weakness  EYES: No eye pain,  No visual disturbances, No discharge, NO Redness  ENMT:  No ear pain, No nose bleed, No vertigo; No sinus pain, NO throat pain, No Congestion  NECK: No pain, No stiffness  RESPIRATORY: No cough, NO wheezing, No  hemoptysis, NO  shortness of breath  CARDIOVASCULAR: No chest pain, palpitations  GASTROINTESTINAL: No abdominal pain, NO epigastric pain. No nausea, No vomiting; No diarrhea, No constipation. [x  ] No BM  GENITOURINARY: No dysuria, No frequency, No urgency, No hematuria, NO incontinence  NEUROLOGICAL: No headaches, No dizziness, No numbness, No tingling, No tremors, No weakness  EXT: No Swelling, No Pain, No Edema  SKIN:  [ x ] No itching, burning, rashes, or lesions   MUSCULOSKELETAL: No joint pain ,No Jt swelling; No muscle pain, No back pain, No extremity pain  PSYCHIATRIC: No depression,  No anxiety,  No mood swings ,No difficulty sleeping at night  PAIN SCALE: [ x ] None  [  ] Other-  ROS Unable to obtain due to - [  ] Dementia  [  ] Lethargy [  ] Drowsy [  ] Sedated [  ] non verbal  REST OF REVIEW Of SYSTEM - [ x ] Normal     Vital Signs Last 24 Hrs  T(C): 36.3 (28 Apr 2022 11:02), Max: 36.4 (27 Apr 2022 19:41)  T(F): 97.4 (28 Apr 2022 11:02), Max: 97.6 (27 Apr 2022 19:41)  HR: 77 (28 Apr 2022 11:02) (74 - 89)  BP: 147/61 (28 Apr 2022 11:02) (95/64 - 147/61)  BP(mean): --  RR: 18 (28 Apr 2022 11:02) (18 - 19)  SpO2: 99% (28 Apr 2022 11:02) (96% - 99%)  Finger Stick          PHYSICAL EXAM: i am good, on 2L NC  GENERAL:  [ x ] NAD , [x  ] well appearing, [  ] Agitated, [  ] Drowsy,  [  ] Lethargy, [  ] confused   HEAD:  [ x ] Normal, [  ] Other  EYES:  [ x ] EOMI, [ x ] PERRLA, [x  ] conjunctiva and sclera clear normal, [  ] Other,  [  ] Pallor,[  ] Discharge  ENMT:  [x  ] Normal, [ x ] Moist mucous membranes, [x  ] Good dentition, [ x ] No Thrush  NECK:  [x  ] Supple, [x  ] No JVD, [x  ] Normal thyroid, [  ] Lymphadenopathy [  ] Other  CHEST/LUNG:  [ x ] Clear to auscultation bilaterally, [x  ] Breath Sounds equal B/L / Decrease, [x  ] poor effort  [x  ] No rales, [ x ] No rhonchi  [x  ]  No wheezing,   HEART:  [ x ] Regular rate and rhythm, [  ] tachycardia, [  ] Bradycardia,  [  ] irregular  [ x ] No murmurs, No rubs, No gallops, [  ] PPM in place (Mfr:  )  ABDOMEN:  [x  ] Soft, [x  ] Nontender, [  x] Nondistended, [ x ] No mass, [ x ] Bowel sounds present, [  ] obese  NERVOUS SYSTEM:  [x  ] Alert & Oriented X3, [ x ] Nonfocal  [  ] Confusion  [  ] Encephalopathic [  ] Sedated [  ] Unable to assess, [  ] Dementia [  ] Other-  EXTREMITIES: [x  ] 2+ Peripheral Pulses, No clubbing, No cyanosis,  [  ] edema B/L lower EXT. [  ] PVD stasis skin changes B/L Lower EXT, [  ] wound  LYMPH: No lymphadenopathy noted  SKIN:  [x  ] No rashes or lesions, [  ] Pressure Ulcers, [  ] ecchymosis, [  ] Skin Tears, [  ] Other    DIET: Diet, DASH/TLC:   Sodium & Cholesterol Restricted  1000mL Fluid Restriction ([id number]) (04-25-22 @ 17:44)      LABS:                        13.5   5.88  )-----------( 206      ( 28 Apr 2022 09:03 )             40.3     28 Apr 2022 09:03    139    |  108    |  37     ----------------------------<  143    4.1     |  23     |  1.30     Ca    9.6        28 Apr 2022 09:03    TPro  6.6    /  Alb  3.3    /  TBili  0.5    /  DBili  x      /  AST  18     /  ALT  16     /  AlkPhos  69     28 Apr 2022 09:03        Serum Pro-Brain Natriuretic Peptide: 9188 pg/mL (04-25-22 @ 14:34)      RADIOLOGY & ADDITIONAL TESTS:      HEALTH ISSUES - PROBLEM Dx:  Chronic atrial fibrillation    HTN (hypertension)    Dementia    GERD (gastroesophageal reflux disease)    Aortic stenosis    BRADY (acute kidney injury)    Allergic conjunctivitis, bilateral    Need for prophylactic measure    Chest pressure      Consultant(s) Notes Reviewed:  [ x ] YES     Care Discussed with [X] Consultants  [ x ] Patient  [ x ] Family- dtr  ] HCP [  ]   [  ] Social Service  [  x] RN, [  ] Physical Therapy,[  ] Palliative care team  DVT PPX: [  ] Lovenox, [  ] S C Heparin, [  ] Coumadin, [  x] Xarelto, [  ] Eliquis, [  ] Pradaxa, [  ] IV Heparin drip, [  ] SCD [  ] Contraindication 2 to GI Bleed,[  ] Ambulation [  ] Contraindicated 2 to  bleed [  ] Contraindicated 2 to Brain Bleed  Advanced directive: [  ] None, [  ] DNR/DNI [x] Prior MOLST

## 2022-04-28 NOTE — PROGRESS NOTE ADULT - PROBLEM SELECTOR PLAN 5
Continue Toprol  mg, Cardizem 120 mg daily, Lasix 40 mg QOD with hold parameters  - Hold spironolactone 50 mg daily for now to avoid overdiuresis, can resume in AM if needed  - Monitor routine hemodynamics Continue Toprol  mg, Cardizem 120 mg daily,  - Lasix 40 mg QOD on HOLD as per cardiology  - Hold spironolactone 50 mg daily for now to avoid overdiuresis, can resume in AM if needed

## 2022-04-28 NOTE — PROGRESS NOTE ADULT - PROBLEM SELECTOR PLAN 1
Chest Pressure/ YOUNG likely 2/2 Severe AS.  + Troponin ,likely 2/2 Demand ischemia, also 2/2 BRADY with decrease clearance.  -Serial CPK/MB/Troponin -q 8 hrs   -pt had Recent ECHO March 2022  -Cardiology-Dr Gray  follow up   Awaiting Transfer to [facility] for TAVR eval.-Pt is a poor candidate 2/2 BRADY  On Toprol  mg daily Chest Pressure/ YOUNG likely 2/2 Severe AS.-RESOLVED  + Troponin ,likely 2/2 Demand ischemia, also 2/2 BRADY with decrease clearance.  -Serial CPK/MB/Troponin -q 8 hrs   -pt had Recent ECHO March 2022  -Cardiology-Dr Gray  follow up   Awaiting Transfer to [facility] for TAVR eval.-Pt is a poor candidate 2/2 BRADY  On Toprol  mg daily

## 2022-04-29 DIAGNOSIS — K59.00 CONSTIPATION, UNSPECIFIED: ICD-10-CM

## 2022-04-29 LAB
ALBUMIN SERPL ELPH-MCNC: 3.1 G/DL — LOW (ref 3.3–5)
ALP SERPL-CCNC: 71 U/L — SIGNIFICANT CHANGE UP (ref 40–120)
ALT FLD-CCNC: 15 U/L — SIGNIFICANT CHANGE UP (ref 12–78)
ANION GAP SERPL CALC-SCNC: 8 MMOL/L — SIGNIFICANT CHANGE UP (ref 5–17)
AST SERPL-CCNC: 14 U/L — LOW (ref 15–37)
BASOPHILS # BLD AUTO: 0.02 K/UL — SIGNIFICANT CHANGE UP (ref 0–0.2)
BASOPHILS NFR BLD AUTO: 0.3 % — SIGNIFICANT CHANGE UP (ref 0–2)
BILIRUB SERPL-MCNC: 0.5 MG/DL — SIGNIFICANT CHANGE UP (ref 0.2–1.2)
BUN SERPL-MCNC: 32 MG/DL — HIGH (ref 7–23)
CALCIUM SERPL-MCNC: 9.1 MG/DL — SIGNIFICANT CHANGE UP (ref 8.5–10.1)
CHLORIDE SERPL-SCNC: 108 MMOL/L — SIGNIFICANT CHANGE UP (ref 96–108)
CO2 SERPL-SCNC: 24 MMOL/L — SIGNIFICANT CHANGE UP (ref 22–31)
CREAT SERPL-MCNC: 1.1 MG/DL — SIGNIFICANT CHANGE UP (ref 0.5–1.3)
EGFR: 46 ML/MIN/1.73M2 — LOW
EOSINOPHIL # BLD AUTO: 0.17 K/UL — SIGNIFICANT CHANGE UP (ref 0–0.5)
EOSINOPHIL NFR BLD AUTO: 2.9 % — SIGNIFICANT CHANGE UP (ref 0–6)
GLUCOSE SERPL-MCNC: 98 MG/DL — SIGNIFICANT CHANGE UP (ref 70–99)
HCT VFR BLD CALC: 37.7 % — SIGNIFICANT CHANGE UP (ref 34.5–45)
HGB BLD-MCNC: 12.5 G/DL — SIGNIFICANT CHANGE UP (ref 11.5–15.5)
IMM GRANULOCYTES NFR BLD AUTO: 0.3 % — SIGNIFICANT CHANGE UP (ref 0–1.5)
LYMPHOCYTES # BLD AUTO: 1.58 K/UL — SIGNIFICANT CHANGE UP (ref 1–3.3)
LYMPHOCYTES # BLD AUTO: 26.6 % — SIGNIFICANT CHANGE UP (ref 13–44)
MCHC RBC-ENTMCNC: 29.3 PG — SIGNIFICANT CHANGE UP (ref 27–34)
MCHC RBC-ENTMCNC: 33.2 GM/DL — SIGNIFICANT CHANGE UP (ref 32–36)
MCV RBC AUTO: 88.3 FL — SIGNIFICANT CHANGE UP (ref 80–100)
MONOCYTES # BLD AUTO: 0.59 K/UL — SIGNIFICANT CHANGE UP (ref 0–0.9)
MONOCYTES NFR BLD AUTO: 9.9 % — SIGNIFICANT CHANGE UP (ref 2–14)
NEUTROPHILS # BLD AUTO: 3.56 K/UL — SIGNIFICANT CHANGE UP (ref 1.8–7.4)
NEUTROPHILS NFR BLD AUTO: 60 % — SIGNIFICANT CHANGE UP (ref 43–77)
NRBC # BLD: 0 /100 WBCS — SIGNIFICANT CHANGE UP (ref 0–0)
PLATELET # BLD AUTO: 192 K/UL — SIGNIFICANT CHANGE UP (ref 150–400)
POTASSIUM SERPL-MCNC: 3.9 MMOL/L — SIGNIFICANT CHANGE UP (ref 3.5–5.3)
POTASSIUM SERPL-SCNC: 3.9 MMOL/L — SIGNIFICANT CHANGE UP (ref 3.5–5.3)
PROT SERPL-MCNC: 6.3 G/DL — SIGNIFICANT CHANGE UP (ref 6–8.3)
RBC # BLD: 4.27 M/UL — SIGNIFICANT CHANGE UP (ref 3.8–5.2)
RBC # FLD: 14 % — SIGNIFICANT CHANGE UP (ref 10.3–14.5)
SODIUM SERPL-SCNC: 140 MMOL/L — SIGNIFICANT CHANGE UP (ref 135–145)
WBC # BLD: 5.94 K/UL — SIGNIFICANT CHANGE UP (ref 3.8–10.5)
WBC # FLD AUTO: 5.94 K/UL — SIGNIFICANT CHANGE UP (ref 3.8–10.5)

## 2022-04-29 PROCEDURE — 99232 SBSQ HOSP IP/OBS MODERATE 35: CPT

## 2022-04-29 RX ORDER — POLYETHYLENE GLYCOL 3350 17 G/17G
17 POWDER, FOR SOLUTION ORAL DAILY
Refills: 0 | Status: DISCONTINUED | OUTPATIENT
Start: 2022-04-29 | End: 2022-04-29

## 2022-04-29 RX ORDER — LINACLOTIDE 145 UG/1
290 CAPSULE, GELATIN COATED ORAL ONCE
Refills: 0 | Status: COMPLETED | OUTPATIENT
Start: 2022-04-29 | End: 2022-04-29

## 2022-04-29 RX ORDER — LINACLOTIDE 145 UG/1
1 CAPSULE, GELATIN COATED ORAL
Qty: 30 | Refills: 0
Start: 2022-04-29 | End: 2022-05-28

## 2022-04-29 RX ORDER — POLYETHYLENE GLYCOL 3350 17 G/17G
17 POWDER, FOR SOLUTION ORAL
Refills: 0 | Status: DISCONTINUED | OUTPATIENT
Start: 2022-04-29 | End: 2022-04-30

## 2022-04-29 RX ADMIN — FAMOTIDINE 20 MILLIGRAM(S): 10 INJECTION INTRAVENOUS at 11:29

## 2022-04-29 RX ADMIN — POLYETHYLENE GLYCOL 3350 17 GRAM(S): 17 POWDER, FOR SOLUTION ORAL at 17:48

## 2022-04-29 RX ADMIN — LINACLOTIDE 290 MICROGRAM(S): 145 CAPSULE, GELATIN COATED ORAL at 17:48

## 2022-04-29 RX ADMIN — Medication 120 MILLIGRAM(S): at 05:30

## 2022-04-29 RX ADMIN — PANTOPRAZOLE SODIUM 40 MILLIGRAM(S): 20 TABLET, DELAYED RELEASE ORAL at 11:29

## 2022-04-29 RX ADMIN — SENNA PLUS 2 TABLET(S): 8.6 TABLET ORAL at 21:16

## 2022-04-29 RX ADMIN — Medication 100 MILLIGRAM(S): at 05:30

## 2022-04-29 RX ADMIN — ZOLPIDEM TARTRATE 5 MILLIGRAM(S): 10 TABLET ORAL at 21:29

## 2022-04-29 RX ADMIN — RIVAROXABAN 15 MILLIGRAM(S): KIT at 17:48

## 2022-04-29 NOTE — CONSULT NOTE ADULT - SUBJECTIVE AND OBJECTIVE BOX
[facility]  Michel Miller PA-C  [facility] [place] [address]  [phone]      Chief Complaint:  Patient is a 89y old  Female who presents with a chief complaint of Shortness of breath (2022 10:59)      HPI: This is a 89 y/o female with PMHx of Afib on Xarelto, CHF, HTN, GERD, osteoarthritis, anxiety, iron deficiency anemia who presents with 3 days of worsening dyspnea on exertion along with chest discomfort. Hx was obtained from pt's daughter, as hx was limited due to patient's dementia. At baseline, pt lives alone with help from an aide, but is able to perform most of her ADLs and cook on her own without difficulty. However, pt has had increasing difficulty with ADLs due to her shortness of breath. Pt also has midline back pain radiating to both arms and some chest discomfort that occurs when the patient is short of breath. She has not had any fevers, chills, lightheadedness, palpitations, abd pain, constipation/diarrhea, dysuria, or pain/swelling in the legs. Pt has no other complaints or concerns at this time, and only current symptom is L-sided back/arm pain.    Allergies:  No Known Allergies      Medications:  digoxin     Tablet 125 MICROGram(s) Oral <User Schedule>  diltiazem    milliGRAM(s) Oral daily  famotidine    Tablet 20 milliGRAM(s) Oral daily  metoprolol succinate  milliGRAM(s) Oral daily  pantoprazole  Injectable 40 milliGRAM(s) IV Push daily  rivaroxaban 15 milliGRAM(s) Oral with dinner  senna 2 Tablet(s) Oral at bedtime  zolpidem 5 milliGRAM(s) Oral at bedtime PRN      PMHX/PSHX:  Chronic atrial fibrillation    CHF, chronic    Hypertension    Osteoarthritis    Dementia    GERD (gastroesophageal reflux disease)    Iron deficiency anemia    History of cholecystectomy        Family history:  Family history of breast cancer (Mother)    FH: diabetes mellitus (Father)        Social History:     ROS:     General:  No wt loss, fevers, chills, night sweats, fatigue,   Eyes:  Good vision, no reported pain  ENT:  No sore throat, pain, runny nose, dysphagia  CV:  No pain, palpitations, hypo/hypertension  Resp:  No dyspnea, cough, tachypnea, wheezing  GI:  No pain, No nausea, No vomiting, No diarrhea, No constipation, No weight loss, No fever, No pruritis, No rectal bleeding, No tarry stools, No dysphagia,  :  No pain, bleeding, incontinence, nocturia  Muscle:  No pain, weakness  Neuro:  No weakness, tingling, memory problems  Psych:  No fatigue, insomnia, mood problems, depression  Endocrine:  No polyuria, polydipsia, cold/heat intolerance  Heme:  No petechiae, ecchymosis, easy bruisability  Skin:  No rash, tattoos, scars, edema      PHYSICAL EXAM:   Vital Signs:  Vital Signs Last 24 Hrs  T(C): 36.3 (2022 04:05), Max: 36.3 (2022 20:55)  T(F): 97.4 (2022 04:05), Max: 97.4 (2022 20:55)  HR: 68 (2022 04:05) (68 - 70)  BP: 124/69 (2022 04:05) (105/57 - 124/69)  BP(mean): --  RR: 17 (2022 04:05) (16 - 17)  SpO2: 99% (2022 04:05) (99% - 99%)  Daily     Daily Weight in k.5 (2022 04:05)    GENERAL:  Appears stated age,   HEENT:  NC/AT,    CHEST:  Full & symmetric excursion,   HEART:  Regular rhythm  ABDOMEN:  Soft, non-tender, non-distended,   EXTEREMITIES:  no cyanosis,clubbing or edema  SKIN:  No rash  NEURO:  Alert,    LABS:                        12.5   5.94  )-----------( 192      ( 2022 07:30 )             37.7     04    140  |  108  |  32<H>  ----------------------------<  98  3.9   |  24  |  1.10    Ca    9.1      2022 07:30    TPro  6.3  /  Alb  3.1<L>  /  TBili  0.5  /  DBili  x   /  AST  14<L>  /  ALT  15  /  AlkPhos  71  04-29    LIVER FUNCTIONS - ( 2022 07:30 )  Alb: 3.1 g/dL / Pro: 6.3 g/dL / ALK PHOS: 71 U/L / ALT: 15 U/L / AST: 14 U/L / GGT: x                   Imaging:

## 2022-04-29 NOTE — PROGRESS NOTE ADULT - PROBLEM SELECTOR PLAN 2
Pt presenting with increased dyspnea on exertion, chest discomfort for 3 days  - Likely 2/2 symptomatic aortic stenosis  - Prior echo from March 2022 showing LVEF of 65-70%, moderate to severe aortic stenosis  - Cardiology Dr. Alegre consulted in ED, pt accepted for transfer to [facility] for structural heart evaluation (accepting physician Dr. Haynes)  - As per cardio, pt is poor candidate for TAVR but may benefit from repeat structural heart evaluation and possible BAV  - Decrease Lasix to 40 mg every other day-HOLD as d/w DR MARIELY Mukherjee.  - Strict Is/Os, daily weights, fluid restriction to 1 L daily.  - on Toprol  mg daily  - Small dose of Xanax x1 on 4/27 d/w DR clement for Anxiety

## 2022-04-29 NOTE — PROGRESS NOTE ADULT - PROBLEM SELECTOR PLAN 1
Chest Pressure/ YOUNG likely 2/2 Severe AS.-RESOLVED  + Troponin ,likely 2/2 Demand ischemia, also 2/2 BRADY with decrease clearance.  -Serial CPK/MB/Troponin -q 8 hrs   -pt had Recent ECHO March 2022  -Cardiology-Dr Gray  follow up   Awaiting Transfer to [facility] for TAVR eval.-Pt is a poor candidate 2/2 BRADY  On Toprol  mg daily

## 2022-04-29 NOTE — PROGRESS NOTE ADULT - PROBLEM SELECTOR PLAN 5
Continue Toprol  mg, Cardizem 120 mg daily,  - Lasix 40 mg QOD on HOLD as per cardiology  - Hold spironolactone 50 mg daily for now to avoid overdiuresis, can resume in AM if needed

## 2022-04-29 NOTE — CONSULT NOTE ADULT - ASSESSMENT
chronic constipation  abdominal pain       chronic constipation  abdominal pain    increase miralax to bid  cont regular diet  cont senna  previously did not respond to linzess 72mcg ; can consider to increase 145mcg as outpatient  monitor GI fn  will follow

## 2022-04-29 NOTE — PROGRESS NOTE ADULT - PROBLEM SELECTOR PLAN 8
Pt on home olopatadine drops for allergic conjunctivitis, hold while inpatient on Senna Q HS  Miralax 2x day.  1 dose Linzess  po as per Dr Sheikh LEÓN

## 2022-04-29 NOTE — PROGRESS NOTE ADULT - ATTENDING COMMENTS
89 female  PMH Afib on Xarelto, CHF, HTN, GERD, osteoarthritis, anxiety, iron deficiency anemia presents to ER with daughter c/o chest pain, left sided, radiating to left arm, shortness of breath, worse with exertion for the past few days and generalized weakness.   Pt seen, examined, case & care plan d/w pt, residents at detail.  D/W Cardiologist -DR Lopez  at [facility]. will restart Lasix 40 mg every other day on D/C   -NO IVF ,   PO diet  D/W DR Granados & Dtr at detail, d/w transfer center -No Beds available.  Pt & Family wants  pt to go home NOW - PT follow up---> HCPT  Total care time is 45 minutes.

## 2022-04-29 NOTE — PROGRESS NOTE ADULT - ASSESSMENT
89 female  PMH Afib on Xarelto, CHF, HTN, GERD, osteoarthritis, anxiety, iron deficiency anemia presents to ER with daughter c/o chest pain, left sided, radiating to left arm, shortness of breath, worse with exertion for the past few days and generalized weakness,     A fib, sever AS, HTN, HLD   - p/w YOUNG, chest tightness and arm pain,   - Echocardiogram 03/2022 demonstrates an NIELS of 0.9 sq cm, a DVI of 0.27, peak and mean gradients of 61 and 29 mmHg, and an AoV of 3.9 m/s; as per our review with Dr Aye Mckeon, these are consistent with moderate to severe AS., mild MR, EF= 65-70%, mod conc LVH, mod TR, borderline pulm HTN  - Pt initially declined TAVR, contacted [facility] and is now accepted for transfer to [facility] for structural heart evaluation (accepting physician Dr. Haynes) for possible BAV vs TAVR, pending bed availability  - Serum Pro-Brain Natriuretic Peptide: 9188   - CXR clear   - no signs of volume overload, s/p Lasix, Assess volume status daily  - Strict intake and output    - Troponin down trending, elevated likely to reduced renal clearance from BRADY, no need to trend further   - EKG showed A fib   - No signs of acute ischemia      - telemetry: A fib 40-60's with non sustained RVR 's   - Continue CCB, BB, AC  - Would like to keep HR close to 60s given severe AS     - Monitor and replete Lytes. Keep K > 4 and Mg > 2  - All other medical needs as per primary team.  - Other cardiovascular workup will depend on clinical course.  - Will continue to follow.    Becka Haider, [facility]  Nurse Practitioner - Cardiology   Spectra #[id number]

## 2022-04-29 NOTE — PROGRESS NOTE ADULT - PROBLEM SELECTOR PLAN 3
BRADY/CKD 2- Cr on admission 2.0, baseline appears to be around 1.0- slowly improving - 1.3 this AM  - Likely secondary to decreased effective circulating volume due to aortic stenosis vs overdiuresis  - BMP in AM   - Avoid nephrotoxic agents and IV contrast agents if possible  - Received Lasix 20 mg IVP in ED  - Lasix 40 mg every other day -  HOLD as per cardio   - Avoid IVF at this time due to AS

## 2022-04-29 NOTE — PROGRESS NOTE ADULT - NS ATTEND AMEND GEN_ALL_CORE FT
chart reviewed    Patient seen and examined    Agree with plans as outlined    89 female  PMH Afib on Xarelto, CHF, HTN, GERD, osteoarthritis, anxiety, iron deficiency anemia presents to ER with daughter c/o chest pain, left sided, radiating to left arm, shortness of breath, worse with exertion for the past few days and generalized weakness,     A fib, sever AS, HTN, HLD   - p/w YOUNG, chest tightness and arm pain   - Echocardiogram 03/2022 demonstrates an NIELS of 0.9 sq cm, a DVI of 0.27, peak and mean gradients of 61 and 29 mmHg, and an AoV of 3.9 m/s; as per our review with Dr Aye Mckeon, Director of Structural Heart Echocardiography, these findings are consistent with moderate to severe AS., mild MR, EF= 65-70%, mod conc LVH, mod TR, borderline pulm HTN  - Pt initially declined TAVR, contacted [facility] and is now accepted for transfer to [facility] for structural heart evaluation (accepting physician Dr. Haynes) for possible valvuloplasty, pending bed availability  - Serum Pro-Brain Natriuretic Peptide: 9188   - CXR clear   - Continue Lasix 40 mg PO every other day. Assess volume status daily  - Strict intake and output  - may need IVF as she is not eating much
doing better overall. cont po diuretics  for transfer to  for BAV vs TAVR when bed available
have been waiting for bed at [facility] for structural heart to eval for bav or tavr  no bed available  no further sob and seems reasonable to defer this eval further  if able to ambulate without dyspnea and hypoxia, can consider dc home with outpt followup
Appears compensated from HF POV. plan for tx to  for structural eval. cont current meds. May need some gentle  IVf as not eating much Further cardiac workup will depend on clinical course.

## 2022-04-29 NOTE — PROGRESS NOTE ADULT - SUBJECTIVE AND OBJECTIVE BOX
Patient is a 89y old  Female who presents with a chief complaint of Shortness of breath (28 Apr 2022 12:21)    HPI:  This is a 89 y/o female with PMHx of Afib on Xarelto, CHF, HTN, GERD, osteoarthritis, anxiety, iron deficiency anemia who presents with 3 days of worsening dyspnea on exertion along with chest discomfort. Hx was obtained from pt's daughter, as hx was limited due to patient's dementia. At baseline, pt lives alone with help from an aide, but is able to perform most of her ADLs and cook on her own without difficulty. However, pt has had increasing difficulty with ADLs due to her shortness of breath. Pt also has midline back pain radiating to both arms and some chest discomfort that occurs when the patient is short of breath. She has not had any fevers, chills, lightheadedness, palpitations, abd pain, constipation/diarrhea, dysuria, or pain/swelling in the legs. Pt has no other complaints or concerns at this time, and only current symptom is L-sided back/arm pain.    ED course:  T 95.9, , /72, RR 22, SpO2 100% on 2L NC  Labs significant for BUN/Cr 53/2.00, BNP 9188, TropI 86.9  CXR negative for acute pathology  EKG AFib, rate 86, LAFB, nonspecific ST/T wave abnormality  Given Lasix 20 mg IVP, Cardizem 10 mg IVP in ED   (25 Apr 2022 18:07)    INTERVAL HPI:  4/26/22: Patient was seen and examined at bedside. States she is feeling well and her breathing has improved. Denies any chest pain or palpitations. Transfer planning to [facility].   4/27/22: Patient was seen and examined at bedside. Denies any acute complaints. Transfer to [facility], awaiting bed.   4/28/22: Patient was seen and examined at bedside. States she is feeling well. No acute complaints. Transfer to [facility], awaiting bed.   4/29/22: Patient was seen and examined at bedside. Denies any acute complaints. Asking to go home. D/w daughter- still want TAVR eval. Transfer to [facility], awaiting bed.     OVERNIGHT EVENTS:  No acute events overnight.     Home Medications:  Ambien 10 mg oral tablet: 1 tab(s) orally once a day (at bedtime) (25 Apr 2022 20:01)  famotidine 20 mg oral tablet: 1 tab(s) orally once a day (26 Apr 2022 22:42)  Lasix 40 mg oral tablet: 1 tab(s) orally every other day (25 Apr 2022 20:01)  memantine 28 mg oral capsule, extended release: 1 cap(s) orally once a day (25 Apr 2022 20:01)  olopatadine 0.2% ophthalmic solution: 1 drop(s) to each affected eye once a day, As Needed (25 Apr 2022 20:01)  pantoprazole 20 mg oral delayed release tablet: 1 tab(s) orally once a day (25 Apr 2022 20:01)  rivaroxaban 15 mg oral tablet: 1 tab(s) orally once a day (25 Apr 2022 20:01)  senna oral tablet: 2 tab(s) orally once a day (at bedtime) (28 Apr 2022 13:34)  Toprol- mg oral tablet, extended release: 1 tab(s) orally once a day (25 Apr 2022 20:01)  Vitamin D3 1250 mcg (50,000 intl units) oral capsule: 1 cap(s) orally once a week on Monday  (25 Apr 2022 20:01)      MEDICATIONS  (STANDING):  digoxin     Tablet 125 MICROGram(s) Oral <User Schedule>  diltiazem    milliGRAM(s) Oral daily  famotidine    Tablet 20 milliGRAM(s) Oral daily  metoprolol succinate  milliGRAM(s) Oral daily  pantoprazole  Injectable 40 milliGRAM(s) IV Push daily  rivaroxaban 15 milliGRAM(s) Oral with dinner  senna 2 Tablet(s) Oral at bedtime    MEDICATIONS  (PRN):  zolpidem 5 milliGRAM(s) Oral at bedtime PRN Insomnia      Allergies    No Known Allergies    Intolerances      Social History:  Lives at home alone, ambulates with walker, has aide to help with ADLs/IADLs  Denies alcohol, tobacco, recreational drug use  Vaccinated for COVID x 3 (Moderna) (25 Apr 2022 18:07)      REVIEW OF SYSTEMS: i am good, i want to go home   CONSTITUTIONAL: No fever, No chills, No fatigue, No myalgia, No Body ache, No Weakness  EYES: No eye pain,  No visual disturbances, No discharge, NO Redness  ENMT:  No ear pain, No nose bleed, No vertigo; No sinus pain, NO throat pain, No Congestion  NECK: No pain, No stiffness  RESPIRATORY: No cough, NO wheezing, No  hemoptysis, NO  shortness of breath  CARDIOVASCULAR: No chest pain, palpitations  GASTROINTESTINAL: No abdominal pain, NO epigastric pain. No nausea, No vomiting; No diarrhea, No constipation. [x  ] No BM  GENITOURINARY: No dysuria, No frequency, No urgency, No hematuria, NO incontinence  NEUROLOGICAL: No headaches, No dizziness, No numbness, No tingling, No tremors, No weakness  EXT: No Swelling, No Pain, No Edema  SKIN:  [ x ] No itching, burning, rashes, or lesions   MUSCULOSKELETAL: No joint pain ,No Jt swelling; No muscle pain, No back pain, No extremity pain  PSYCHIATRIC: No depression,  No anxiety,  No mood swings ,No difficulty sleeping at night  PAIN SCALE: [ x ] None  [  ] Other-  ROS Unable to obtain due to - [  ] Dementia  [  ] Lethargy [  ] Drowsy [  ] Sedated [  ] non verbal  REST OF REVIEW Of SYSTEM - [ x ] Normal       Vital Signs Last 24 Hrs  T(C): 36.3 (29 Apr 2022 04:05), Max: 36.3 (28 Apr 2022 11:02)  T(F): 97.4 (29 Apr 2022 04:05), Max: 97.4 (28 Apr 2022 11:02)  HR: 68 (29 Apr 2022 04:05) (68 - 77)  BP: 124/69 (29 Apr 2022 04:05) (105/57 - 147/61)  BP(mean): --  RR: 17 (29 Apr 2022 04:05) (16 - 18)  SpO2: 99% (29 Apr 2022 04:05) (99% - 99%)  Finger Stick          PHYSICAL EXAM: i am good, on 2L NC  GENERAL:  [ x ] NAD , [x  ] well appearing, [  ] Agitated, [  ] Drowsy,  [  ] Lethargy, [  ] confused   HEAD:  [ x ] Normal, [  ] Other  EYES:  [ x ] EOMI, [ x ] PERRLA, [x  ] conjunctiva and sclera clear normal, [  ] Other,  [  ] Pallor,[  ] Discharge  ENMT:  [x  ] Normal, [ x ] Moist mucous membranes, [x  ] Good dentition, [ x ] No Thrush  NECK:  [x  ] Supple, [x  ] No JVD, [x  ] Normal thyroid, [  ] Lymphadenopathy [  ] Other  CHEST/LUNG:  [ x ] Clear to auscultation bilaterally, [x  ] Breath Sounds equal B/L / Decrease, [x  ] poor effort  [x  ] No rales, [ x ] No rhonchi  [x  ]  No wheezing,   HEART:  [ x ] Regular rate and rhythm, [  ] tachycardia, [  ] Bradycardia,  [  ] irregular  [ x ] No murmurs, No rubs, No gallops, [  ] PPM in place (Mfr:  )  ABDOMEN:  [x  ] Soft, [x  ] Nontender, [  x] Nondistended, [ x ] No mass, [ x ] Bowel sounds present, [  ] obese  NERVOUS SYSTEM:  [x  ] Alert & Oriented X3, [ x ] Nonfocal  [  ] Confusion  [  ] Encephalopathic [  ] Sedated [  ] Unable to assess, [  ] Dementia [  ] Other-  EXTREMITIES: [x  ] 2+ Peripheral Pulses, No clubbing, No cyanosis,  [  ] edema B/L lower EXT. [  ] PVD stasis skin changes B/L Lower EXT, [  ] wound  LYMPH: No lymphadenopathy noted  SKIN:  [x  ] No rashes or lesions, [  ] Pressure Ulcers, [  ] ecchymosis, [  ] Skin Tears, [  ] Other      DIET: Diet, DASH/TLC:   Sodium & Cholesterol Restricted  1000mL Fluid Restriction ([id number]) (04-25-22 @ 17:44)      LABS:                        12.5   5.94  )-----------( 192      ( 29 Apr 2022 07:30 )             37.7     29 Apr 2022 07:30    140    |  108    |  32     ----------------------------<  98     3.9     |  24     |  1.10     Ca    9.1        29 Apr 2022 07:30    TPro  6.3    /  Alb  3.1    /  TBili  0.5    /  DBili  x      /  AST  14     /  ALT  15     /  AlkPhos  71     29 Apr 2022 07:30          CARDIAC MARKERS ( 25 Apr 2022 14:34 )  x     / x     / x     / x     / 3.0 ng/mL           Anemia Panel:      Thyroid Panel:          Serum Pro-Brain Natriuretic Peptide: 9188 pg/mL (04-25-22 @ 14:34)      RADIOLOGY & ADDITIONAL TESTS:      HEALTH ISSUES - PROBLEM Dx:  Chronic atrial fibrillation    HTN (hypertension)    Dementia    GERD (gastroesophageal reflux disease)    Aortic stenosis    BRADY (acute kidney injury)    Allergic conjunctivitis, bilateral    Need for prophylactic measure    Chest pressure        Consultant(s) Notes Reviewed:  [ x ] YES     Care Discussed with [X] Consultants  [ x ] Patient  [ x ] Family- dtr  ] HCP [  ]   [  ] Social Service  [  x] RN, [  ] Physical Therapy,[  ] Palliative care team  DVT PPX: [  ] Lovenox, [  ] S C Heparin, [  ] Coumadin, [  x] Xarelto, [  ] Eliquis, [  ] Pradaxa, [  ] IV Heparin drip, [  ] SCD [  ] Contraindication 2 to GI Bleed,[  ] Ambulation [  ] Contraindicated 2 to  bleed [  ] Contraindicated 2 to Brain Bleed  Advanced directive: [  ] None, [  ] DNR/DNI [x] Prior [facility]  Patient is a 89y old  Female who presents with a chief complaint of Shortness of breath (28 Apr 2022 12:21)    HPI:  This is a 89 y/o female with PMHx of Afib on Xarelto, CHF, HTN, GERD, osteoarthritis, anxiety, iron deficiency anemia who presents with 3 days of worsening dyspnea on exertion along with chest discomfort. Hx was obtained from pt's daughter, as hx was limited due to patient's dementia. At baseline, pt lives alone with help from an aide, but is able to perform most of her ADLs and cook on her own without difficulty. However, pt has had increasing difficulty with ADLs due to her shortness of breath. Pt also has midline back pain radiating to both arms and some chest discomfort that occurs when the patient is short of breath. She has not had any fevers, chills, lightheadedness, palpitations, abd pain, constipation/diarrhea, dysuria, or pain/swelling in the legs. Pt has no other complaints or concerns at this time, and only current symptom is L-sided back/arm pain.    ED course:  T 95.9, , /72, RR 22, SpO2 100% on 2L NC  Labs significant for BUN/Cr 53/2.00, BNP 9188, TropI 86.9  CXR negative for acute pathology  EKG AFib, rate 86, LAFB, nonspecific ST/T wave abnormality  Given Lasix 20 mg IVP, Cardizem 10 mg IVP in ED   (25 Apr 2022 18:07)    INTERVAL HPI:  4/26/22: Patient was seen and examined at bedside. States she is feeling well and her breathing has improved. Denies any chest pain or palpitations. Transfer planning to [facility].   4/27/22: Patient was seen and examined at bedside. Denies any acute complaints. Transfer to [facility], awaiting bed.   4/28/22: Patient was seen and examined at bedside. States she is feeling well. No acute complaints. Transfer to [facility], awaiting bed.   4/29/22: Patient was seen and examined at bedside. Denies any acute complaints. Asking to go home. D/w daughter- still want TAVR eval. Transfer to [facility], awaiting bed.     OVERNIGHT EVENTS:  No acute events overnight.     Home Medications:  Ambien 10 mg oral tablet: 1 tab(s) orally once a day (at bedtime) (25 Apr 2022 20:01)  famotidine 20 mg oral tablet: 1 tab(s) orally once a day (26 Apr 2022 22:42)  Lasix 40 mg oral tablet: 1 tab(s) orally every other day (25 Apr 2022 20:01)  memantine 28 mg oral capsule, extended release: 1 cap(s) orally once a day (25 Apr 2022 20:01)  olopatadine 0.2% ophthalmic solution: 1 drop(s) to each affected eye once a day, As Needed (25 Apr 2022 20:01)  pantoprazole 20 mg oral delayed release tablet: 1 tab(s) orally once a day (25 Apr 2022 20:01)  rivaroxaban 15 mg oral tablet: 1 tab(s) orally once a day (25 Apr 2022 20:01)  senna oral tablet: 2 tab(s) orally once a day (at bedtime) (28 Apr 2022 13:34)  Toprol- mg oral tablet, extended release: 1 tab(s) orally once a day (25 Apr 2022 20:01)  Vitamin D3 1250 mcg (50,000 intl units) oral capsule: 1 cap(s) orally once a week on Monday  (25 Apr 2022 20:01)      MEDICATIONS  (STANDING):  digoxin     Tablet 125 MICROGram(s) Oral <User Schedule>  diltiazem    milliGRAM(s) Oral daily  famotidine    Tablet 20 milliGRAM(s) Oral daily  metoprolol succinate  milliGRAM(s) Oral daily  pantoprazole  Injectable 40 milliGRAM(s) IV Push daily  rivaroxaban 15 milliGRAM(s) Oral with dinner  senna 2 Tablet(s) Oral at bedtime    MEDICATIONS  (PRN):  zolpidem 5 milliGRAM(s) Oral at bedtime PRN Insomnia      Allergies    No Known Allergies    Intolerances      Social History:  Lives at home alone, ambulates with walker, has aide to help with ADLs/IADLs  Denies alcohol, tobacco, recreational drug use  Vaccinated for COVID x 3 (Moderna) (25 Apr 2022 18:07)      REVIEW OF SYSTEMS: i am good, i want to go home   CONSTITUTIONAL: No fever, No chills, No fatigue, No myalgia, No Body ache, No Weakness  EYES: No eye pain,  No visual disturbances, No discharge, NO Redness  ENMT:  No ear pain, No nose bleed, No vertigo; No sinus pain, NO throat pain, No Congestion  NECK: No pain, No stiffness  RESPIRATORY: No cough, NO wheezing, No  hemoptysis, NO  shortness of breath  CARDIOVASCULAR: No chest pain, palpitations  GASTROINTESTINAL: No abdominal pain, NO epigastric pain. No nausea, No vomiting; No diarrhea, No constipation. [x  ] No BM  GENITOURINARY: No dysuria, No frequency, No urgency, No hematuria, NO incontinence  NEUROLOGICAL: No headaches, No dizziness, No numbness, No tingling, No tremors, No weakness  EXT: No Swelling, No Pain, No Edema  SKIN:  [ x ] No itching, burning, rashes, or lesions   MUSCULOSKELETAL: No joint pain ,No Jt swelling; No muscle pain, No back pain, No extremity pain  PSYCHIATRIC: No depression,  No anxiety,  No mood swings ,No difficulty sleeping at night  PAIN SCALE: [ x ] None  [  ] Other-  ROS Unable to obtain due to - [  ] Dementia  [  ] Lethargy [  ] Drowsy [  ] Sedated [  ] non verbal  REST OF REVIEW Of SYSTEM - [ x ] Normal       Vital Signs Last 24 Hrs  T(C): 36.3 (29 Apr 2022 04:05), Max: 36.3 (28 Apr 2022 11:02)  T(F): 97.4 (29 Apr 2022 04:05), Max: 97.4 (28 Apr 2022 11:02)  HR: 68 (29 Apr 2022 04:05) (68 - 77)  BP: 124/69 (29 Apr 2022 04:05) (105/57 - 147/61)  BP(mean): --  RR: 17 (29 Apr 2022 04:05) (16 - 18)  SpO2: 99% (29 Apr 2022 04:05) (99% - 99%)  Finger Stick          PHYSICAL EXAM: i am good, on 2L NC  GENERAL:  [ x ] NAD , [x  ] well appearing, [  ] Agitated, [  ] Drowsy,  [  ] Lethargy, [  ] confused   HEAD:  [ x ] Normal, [  ] Other  EYES:  [ x ] EOMI, [ x ] PERRLA, [x  ] conjunctiva and sclera clear normal, [  ] Other,  [  ] Pallor,[  ] Discharge  ENMT:  [x  ] Normal, [ x ] Moist mucous membranes, [x  ] Good dentition, [ x ] No Thrush  NECK:  [x  ] Supple, [x  ] No JVD, [x  ] Normal thyroid, [  ] Lymphadenopathy [  ] Other  CHEST/LUNG:  [ x ] Clear to auscultation bilaterally, [x  ] Breath Sounds equal B/L / Decrease, [x  ] poor effort  [x  ] No rales, [ x ] No rhonchi  [x  ]  No wheezing,   HEART:  [ x ] Regular rate and rhythm, [  ] tachycardia, [  ] Bradycardia,  [  ] irregular  [ x ] No murmurs, No rubs, No gallops, [  ] PPM in place (Mfr:  )  ABDOMEN:  [x  ] Soft, [x  ] Nontender, [  x] Nondistended, [ x ] No mass, [ x ] Bowel sounds present, [  ] obese  NERVOUS SYSTEM:  [x  ] Alert & Oriented X3, [ x ] Nonfocal  [  ] Confusion  [  ] Encephalopathic [  ] Sedated [  ] Unable to assess, [  ] Dementia [  ] Other-  EXTREMITIES: [x  ] 2+ Peripheral Pulses, No clubbing, No cyanosis,  [  ] edema B/L lower EXT. [  ] PVD stasis skin changes B/L Lower EXT, [  ] wound  LYMPH: No lymphadenopathy noted  SKIN:  [x  ] No rashes or lesions, [  ] Pressure Ulcers, [  ] ecchymosis, [  ] Skin Tears, [  ] Other      DIET: Diet, DASH/TLC:   Sodium & Cholesterol Restricted  1000mL Fluid Restriction ([id number]) (04-25-22 @ 17:44)      LABS:                        12.5   5.94  )-----------( 192      ( 29 Apr 2022 07:30 )             37.7     29 Apr 2022 07:30    140    |  108    |  32     ----------------------------<  98     3.9     |  24     |  1.10     Ca    9.1        29 Apr 2022 07:30    TPro  6.3    /  Alb  3.1    /  TBili  0.5    /  DBili  x      /  AST  14     /  ALT  15     /  AlkPhos  71     29 Apr 2022 07:30      CARDIAC MARKERS ( 25 Apr 2022 14:34 )  x     / x     / x     / x     / 3.0 ng/mL      Serum Pro-Brain Natriuretic Peptide: 9188 pg/mL (04-25-22 @ 14:34)      RADIOLOGY & ADDITIONAL TESTS:none      HEALTH ISSUES - PROBLEM Dx:  Chronic atrial fibrillation    HTN (hypertension)    Dementia    GERD (gastroesophageal reflux disease)    Aortic stenosis    BRADY (acute kidney injury)    Allergic conjunctivitis, bilateral    Need for prophylactic measure    Chest pressure        Consultant(s) Notes Reviewed:  [ x ] YES     Care Discussed with [X] Consultants  [ x ] Patient  [ x ] Family- dtr  ] HCP [ x ]   [  ] Social Service  [  x] RN, [x  ] Physical Therapy,[  ] Palliative care team  DVT PPX: [  ] Lovenox, [  ] S C Heparin, [  ] Coumadin, [  x] Xarelto, [  ] Eliquis, [  ] Pradaxa, [  ] IV Heparin drip, [  ] SCD [  ] Contraindication 2 to GI Bleed,[  ] Ambulation [  ] Contraindicated 2 to  bleed [  ] Contraindicated 2 to Brain Bleed  Advanced directive: [  ] None, [  ] DNR/DNI [x] Prior [facility]

## 2022-04-29 NOTE — PROGRESS NOTE ADULT - SUBJECTIVE AND OBJECTIVE BOX
[facility] -- Sage Monzon, Isaac, Jessica, Lonny Alegre, Isabella Handley: Office # [phone]    Follow Up:  chest pain, valvular disease    Subjective/Observations: Patient seen and examined. Patient awake, alert, resting comfortably in bed, lying flat, non orthopneic. denies chest pain, dyspnea Tolerating O2 via nasal cannula.     REVIEW OF SYSTEMS: All review of systems is negative for eye, ENT, GI, , allergic, dermatologic, musculoskeletal and neurologic except as described above    PAST MEDICAL & SURGICAL HISTORY:  Chronic atrial fibrillation    CHF, chronic    Hypertension    Osteoarthritis    Dementia    GERD (gastroesophageal reflux disease)    Iron deficiency anemia    History of cholecystectomy        MEDICATIONS  (STANDING):  digoxin     Tablet 125 MICROGram(s) Oral <User Schedule>  diltiazem    milliGRAM(s) Oral daily  famotidine    Tablet 20 milliGRAM(s) Oral daily  metoprolol succinate  milliGRAM(s) Oral daily  pantoprazole  Injectable 40 milliGRAM(s) IV Push daily  rivaroxaban 15 milliGRAM(s) Oral with dinner  senna 2 Tablet(s) Oral at bedtime    MEDICATIONS  (PRN):  zolpidem 5 milliGRAM(s) Oral at bedtime PRN Insomnia    Allergies    No Known Allergies    Intolerances      Vital Signs Last 24 Hrs  T(C): 36.3 (29 Apr 2022 04:05), Max: 36.3 (28 Apr 2022 11:02)  T(F): 97.4 (29 Apr 2022 04:05), Max: 97.4 (28 Apr 2022 11:02)  HR: 68 (29 Apr 2022 04:05) (68 - 77)  BP: 124/69 (29 Apr 2022 04:05) (105/57 - 147/61)  BP(mean): --  RR: 17 (29 Apr 2022 04:05) (16 - 18)  SpO2: 99% (29 Apr 2022 04:05) (99% - 99%)  I&O's Summary        TELE: A fib 40-60's with non sustained RVR 's   PHYSICAL EXAM:  Constitutional: NAD, awake and alert, Frail   HEENT: Moist Mucous Membranes, Anicteric  Pulmonary: Non-labored, breath sounds are clear bilaterally, No wheezing, rales or rhonchi  Cardiovascular: Irregular, S1 and S2, + murmurs, rubs, gallops or clicks  Gastrointestinal:  soft, nontender, nondistended   Lymph: No peripheral edema. No lymphadenopathy.   Skin: No visible rashes or ulcers.  Psych:  Mood & affect appropriate    LABS: All Labs Reviewed:                        12.5   5.94  )-----------( 192      ( 29 Apr 2022 07:30 )             37.7                         13.5   5.88  )-----------( 206      ( 28 Apr 2022 09:03 )             40.3                         13.2   5.97  )-----------( 210      ( 27 Apr 2022 07:44 )             38.9     29 Apr 2022 07:30    140    |  108    |  32     ----------------------------<  98     3.9     |  24     |  1.10   28 Apr 2022 09:03    139    |  108    |  37     ----------------------------<  143    4.1     |  23     |  1.30   27 Apr 2022 07:44    136    |  102    |  44     ----------------------------<  110    3.2     |  26     |  1.50     Ca    9.1        29 Apr 2022 07:30  Ca    9.6        28 Apr 2022 09:03  Ca    9.3        27 Apr 2022 07:44    TPro  6.3    /  Alb  3.1    /  TBili  0.5    /  DBili  x      /  AST  14     /  ALT  15     /  AlkPhos  71     29 Apr 2022 07:30  TPro  6.6    /  Alb  3.3    /  TBili  0.5    /  DBili  x      /  AST  18     /  ALT  16     /  AlkPhos  69     28 Apr 2022 09:03  TPro  6.6    /  Alb  3.4    /  TBili  0.6    /  DBili  x      /  AST  22     /  ALT  17     /  AlkPhos  74     27 Apr 2022 07:44      Troponin I, High Sensitivity Result: 108.5 ng/L (04-26-22 @ 07:56)  Troponin I, High Sensitivity Result: 124.2 ng/L (04-26-22 @ 00:02)  Troponin I, High Sensitivity Result: 116.5 ng/L (04-25-22 @ 21:34)  Troponin I, High Sensitivity Result: 86.9 ng/L (04-25-22 @ 14:34)                12 Lead ECG:   Ventricular Rate 86 BPM    QRS Duration 112 ms    Q-T Interval 376 ms    QTC Calculation(Bazett) 449 ms    R Axis -53 degrees    T Axis 136 degrees    Diagnosis Line Atrial fibrillation  Left anterior fascicular block  Moderate voltage criteria forLVH, may be normal variant ( R in aVL , Raad product )  Nonspecific ST and T wave abnormality  Abnormal ECG  When compared with ECG of 17-NOV-2021 22:55,  Significant changes have occurred  Confirmed by Reinier Gray MD ([id number]) on 4/27/2022 11:44:25 AM (04-25-22 @ 14:03)    < from: Xray Chest 1 View- PORTABLE-Urgent (Xray Chest 1 View- PORTABLE-Urgent .) (04.25.22 @ 15:18) >    ACC: [id number] EXAM:  XR CHEST PORTABLE URGENT 1V                          PROCEDURE DATE:  04/25/2022          INTERPRETATION:  Shortness of breath.    AP chest. Prior 11/21/2021. Heart magnified by projection.  no   consolidation or effusion. Degenerative change right shoulder and old   fracture proximal right humerus.    IMPRESSION: No acute findings.    --- End of Report ---            ARIANA KHANNA MD; Attending Radiologist  This document has been electronically signed. Apr 25 2022  4:28PM    < end of copied text >  < from: Transthoracic Echocardiogram (03.08.22 @ 07:48) >    Patient name: LINDSAY SALDAÑA  YOB: 1924   Age: 89 (F)   MR#: [id number]  Study Date: 3/8/2022  Location: O/PSonographer: Domenica ThomasSTACEY  Study quality: Technically good  Referring Physician: Khari Haynes MD / Georges Villarreal MD  Blood Pressure: 124/64 mmHg  Height: 145 cm  Weight: 42 kg  BSA: 1.3 m2  Heart Rate: 59 mmHg  ------------------------------------------------------------------------  PROCEDURE: Transthoracic echocardiogram with 2-D, M-Mode  and complete spectral and color flow Doppler.  INDICATION: Nonrheumatic aortic (valve) stenosis (I35.0)  ------------------------------------------------------------------------  MEASUREMENTS: (See below)  ------------------------------------------------------------------------  OBSERVATIONS:  Mitral Valve: There is extensive mitral annular, leaflets  and subvalvular calcification. Mild mitral regurgitation.  The peak/mean gradients= 4/1mmHg (HR= 61bpm).  Aortic Root: Aortic Root: 2.9 cm.  Ascending Aorta: 3.1 cm.  LVOT diameter: 2 cm.  Aortic Valve: Calcified trileaflet aortic valve with  decreased opening.  There is LVOT calcification. Peak transaortic valve  gradient equals 61 mm Hg, mean transaortic valve gradient  equals 29 mm Hg, estimated aortic valve area equals 0.9  sqcm (by continuity equation), aortic valve velocity time  integral equals 88 cm, the DVI= 0.27, consistent with  moderate-severe aortic stenosis. Minimal aortic  regurgitation.  Peak left ventricular outflow tract  gradient equals 4 mm Hg, mean gradient is equal to 3 mm Hg,  LVOT velocity time integral equals 24 cm.  Left Atrium: Severely dilated left atrium.  LA volume index  = 91 cc/m2.  Left Ventricle: The left ventricular function is increased.   The LVEF= 65-70%. No regional wall motion abnormalities.  Normal left ventricular size with moderate concentric  hypertrophy.  Right Heart: Normal right atrium. Normal right ventricular  size and function. Normal tricuspid valve. Moderate  tricuspid regurgitation. Normal pulmonic valve. No pulmonic  regurgitation.  Pericardium/PleuraThere is a trace pericardial effusion.  Hemodynamic: The estimated right atrial pressure is normal.  Estimated right ventricular systolic pressure equals 36 mm  Hg, assuming right atrial pressure equals 5 mm Hg,  consistent with borderline pulmonary hypertension.  ------------------------------------------------------------------------  CONCLUSIONS:  1. Calcified trileaflet aortic valve with decreased  opening. Peak transaortic valve gradient equals 61 mm Hg,  mean transaortic valve gradient equals 29 mm Hg, estimated  aortic valve area equals 0.9 sqcm (by continuity equation),  aortic valve velocity time integral equals 88 cm, the DVI=  0.27, consistent with moderate-severe aortic stenosis. No  aortic valve regurgitation seen.  *** No previous Echo exam.  ------------------------------------------------------------------------  PROCEDURE DESCRIPTION: Transthoracic echocardiogram with  2-D, M-Mode and complete spectral and color flow Doppler.  ------------------------------------------------------------------------  ECHOCARDIOGRAPHIC EXAMINATION:  AORTIC ROOT:  Aortic Root (Leaflet): 2.9 cm  Aortic Root Index: 1.0  Aortic Tubular: 3.1 cm  LEFT ATRIUM:  AP Dimensions: 4.1 cm  LA Volume Index: 91.00 cc/sqm  LA Volume: 117.8 cc  LEFT VENTRICLE:  LVIDd: 4.1 cm  LVIDs: 2.6 cm  Fraction Short: 37 %  IVS: 1.49  ILWT: 1.4 cm  RWT: 0.70  LV Mass: 234.0 gm  LV Mass Index: 180 gm/sqm  EF (Visual Estimate): 65-70%  HR and BP:  HR: 59 bpm  BP: 124/64  BSA: 1.30  ------------------------------------------------------------------------  HEMODYNAMICS:  LA Pressure Estimate: < 20  PAS: 36  IVRT: 106 ms  ------------------------------------------------------------------------  COLOR FLOW and SPECIAL DOPPLER:  AORTIC VALVE:  AV Peak Velocity: 3.9 M/sec  AV Peak Gradient: 60 mm Hg  AV Mean Gradient: 29 mm Hg  Valve Area: 0.9 sqcm  LVOT:  LVOT Velocity: 1.0 M/sec  LVOT Diameter: 2.0 cm  LVOT Peak Gradient Rest: 4 mm Hg  LVOT Mean Gradient Rest: 3 mm Hg  ------------------------------------------------------------------------  DIASTOLIC FUNCTION:  DT:303 ms  e' Septal: 5.0 cm/s  E/e' Septal: 20.0  e' Lateral: 6.0 cm/s  E/e' Lateral: 16.6  MV E wave: 1.0 m/s  ------------------------------------------------------------------------  Confirmed on  3/8/2022 - 10:15:38 by Aye Mckeon M.D.  ------------------------------------------------------------------------    < end of copied text >

## 2022-04-30 ENCOUNTER — TRANSCRIPTION ENCOUNTER (OUTPATIENT)
Age: 87
End: 2022-04-30

## 2022-04-30 VITALS
HEART RATE: 65 BPM | SYSTOLIC BLOOD PRESSURE: 103 MMHG | DIASTOLIC BLOOD PRESSURE: 62 MMHG | OXYGEN SATURATION: 98 % | TEMPERATURE: 97 F | RESPIRATION RATE: 18 BRPM

## 2022-04-30 DIAGNOSIS — I50.9 HEART FAILURE, UNSPECIFIED: ICD-10-CM

## 2022-04-30 LAB
ALBUMIN SERPL ELPH-MCNC: 3.2 G/DL — LOW (ref 3.3–5)
ALP SERPL-CCNC: 68 U/L — SIGNIFICANT CHANGE UP (ref 40–120)
ALT FLD-CCNC: 16 U/L — SIGNIFICANT CHANGE UP (ref 12–78)
ANION GAP SERPL CALC-SCNC: 8 MMOL/L — SIGNIFICANT CHANGE UP (ref 5–17)
AST SERPL-CCNC: 15 U/L — SIGNIFICANT CHANGE UP (ref 15–37)
BASOPHILS # BLD AUTO: 0.02 K/UL — SIGNIFICANT CHANGE UP (ref 0–0.2)
BASOPHILS NFR BLD AUTO: 0.3 % — SIGNIFICANT CHANGE UP (ref 0–2)
BILIRUB SERPL-MCNC: 0.4 MG/DL — SIGNIFICANT CHANGE UP (ref 0.2–1.2)
BUN SERPL-MCNC: 33 MG/DL — HIGH (ref 7–23)
CALCIUM SERPL-MCNC: 9.4 MG/DL — SIGNIFICANT CHANGE UP (ref 8.5–10.1)
CHLORIDE SERPL-SCNC: 108 MMOL/L — SIGNIFICANT CHANGE UP (ref 96–108)
CO2 SERPL-SCNC: 26 MMOL/L — SIGNIFICANT CHANGE UP (ref 22–31)
CREAT SERPL-MCNC: 1.1 MG/DL — SIGNIFICANT CHANGE UP (ref 0.5–1.3)
EGFR: 46 ML/MIN/1.73M2 — LOW
EOSINOPHIL # BLD AUTO: 0.13 K/UL — SIGNIFICANT CHANGE UP (ref 0–0.5)
EOSINOPHIL NFR BLD AUTO: 2.2 % — SIGNIFICANT CHANGE UP (ref 0–6)
GLUCOSE SERPL-MCNC: 92 MG/DL — SIGNIFICANT CHANGE UP (ref 70–99)
HCT VFR BLD CALC: 37.8 % — SIGNIFICANT CHANGE UP (ref 34.5–45)
HGB BLD-MCNC: 12.2 G/DL — SIGNIFICANT CHANGE UP (ref 11.5–15.5)
IMM GRANULOCYTES NFR BLD AUTO: 0.3 % — SIGNIFICANT CHANGE UP (ref 0–1.5)
LYMPHOCYTES # BLD AUTO: 1.43 K/UL — SIGNIFICANT CHANGE UP (ref 1–3.3)
LYMPHOCYTES # BLD AUTO: 24.7 % — SIGNIFICANT CHANGE UP (ref 13–44)
MAGNESIUM SERPL-MCNC: 2.4 MG/DL — SIGNIFICANT CHANGE UP (ref 1.6–2.6)
MCHC RBC-ENTMCNC: 29.2 PG — SIGNIFICANT CHANGE UP (ref 27–34)
MCHC RBC-ENTMCNC: 32.3 GM/DL — SIGNIFICANT CHANGE UP (ref 32–36)
MCV RBC AUTO: 90.4 FL — SIGNIFICANT CHANGE UP (ref 80–100)
MONOCYTES # BLD AUTO: 0.6 K/UL — SIGNIFICANT CHANGE UP (ref 0–0.9)
MONOCYTES NFR BLD AUTO: 10.4 % — SIGNIFICANT CHANGE UP (ref 2–14)
NEUTROPHILS # BLD AUTO: 3.58 K/UL — SIGNIFICANT CHANGE UP (ref 1.8–7.4)
NEUTROPHILS NFR BLD AUTO: 62.1 % — SIGNIFICANT CHANGE UP (ref 43–77)
NRBC # BLD: 0 /100 WBCS — SIGNIFICANT CHANGE UP (ref 0–0)
PHOSPHATE SERPL-MCNC: 3.9 MG/DL — SIGNIFICANT CHANGE UP (ref 2.5–4.5)
PLATELET # BLD AUTO: 201 K/UL — SIGNIFICANT CHANGE UP (ref 150–400)
POTASSIUM SERPL-MCNC: 3.7 MMOL/L — SIGNIFICANT CHANGE UP (ref 3.5–5.3)
POTASSIUM SERPL-SCNC: 3.7 MMOL/L — SIGNIFICANT CHANGE UP (ref 3.5–5.3)
PROT SERPL-MCNC: 6.3 G/DL — SIGNIFICANT CHANGE UP (ref 6–8.3)
RBC # BLD: 4.18 M/UL — SIGNIFICANT CHANGE UP (ref 3.8–5.2)
RBC # FLD: 14.3 % — SIGNIFICANT CHANGE UP (ref 10.3–14.5)
SODIUM SERPL-SCNC: 142 MMOL/L — SIGNIFICANT CHANGE UP (ref 135–145)
WBC # BLD: 5.78 K/UL — SIGNIFICANT CHANGE UP (ref 3.8–10.5)
WBC # FLD AUTO: 5.78 K/UL — SIGNIFICANT CHANGE UP (ref 3.8–10.5)

## 2022-04-30 PROCEDURE — 85610 PROTHROMBIN TIME: CPT

## 2022-04-30 PROCEDURE — 82553 CREATINE MB FRACTION: CPT

## 2022-04-30 PROCEDURE — 83880 ASSAY OF NATRIURETIC PEPTIDE: CPT

## 2022-04-30 PROCEDURE — 0225U NFCT DS DNA&RNA 21 SARSCOV2: CPT

## 2022-04-30 PROCEDURE — 80162 ASSAY OF DIGOXIN TOTAL: CPT

## 2022-04-30 PROCEDURE — 84484 ASSAY OF TROPONIN QUANT: CPT

## 2022-04-30 PROCEDURE — 97161 PT EVAL LOW COMPLEX 20 MIN: CPT

## 2022-04-30 PROCEDURE — 85730 THROMBOPLASTIN TIME PARTIAL: CPT

## 2022-04-30 PROCEDURE — 99232 SBSQ HOSP IP/OBS MODERATE 35: CPT

## 2022-04-30 PROCEDURE — 85025 COMPLETE CBC W/AUTO DIFF WBC: CPT

## 2022-04-30 PROCEDURE — 99285 EMERGENCY DEPT VISIT HI MDM: CPT

## 2022-04-30 PROCEDURE — 71045 X-RAY EXAM CHEST 1 VIEW: CPT

## 2022-04-30 PROCEDURE — 97116 GAIT TRAINING THERAPY: CPT

## 2022-04-30 PROCEDURE — 93005 ELECTROCARDIOGRAM TRACING: CPT

## 2022-04-30 PROCEDURE — 97110 THERAPEUTIC EXERCISES: CPT

## 2022-04-30 PROCEDURE — 84100 ASSAY OF PHOSPHORUS: CPT

## 2022-04-30 PROCEDURE — 85027 COMPLETE CBC AUTOMATED: CPT

## 2022-04-30 PROCEDURE — 97530 THERAPEUTIC ACTIVITIES: CPT

## 2022-04-30 PROCEDURE — 80053 COMPREHEN METABOLIC PANEL: CPT

## 2022-04-30 PROCEDURE — 36415 COLL VENOUS BLD VENIPUNCTURE: CPT

## 2022-04-30 PROCEDURE — 83735 ASSAY OF MAGNESIUM: CPT

## 2022-04-30 RX ORDER — METOPROLOL TARTRATE 50 MG
100 TABLET ORAL ONCE
Refills: 0 | Status: COMPLETED | OUTPATIENT
Start: 2022-04-30 | End: 2022-04-30

## 2022-04-30 RX ORDER — DIGOXIN 250 MCG
125 TABLET ORAL ONCE
Refills: 0 | Status: COMPLETED | OUTPATIENT
Start: 2022-04-30 | End: 2022-04-30

## 2022-04-30 RX ORDER — POTASSIUM CHLORIDE 20 MEQ
15 PACKET (EA) ORAL
Qty: 0 | Refills: 0 | DISCHARGE

## 2022-04-30 RX ORDER — LINACLOTIDE 145 UG/1
1 CAPSULE, GELATIN COATED ORAL
Qty: 30 | Refills: 0
Start: 2022-04-30 | End: 2022-05-29

## 2022-04-30 RX ORDER — DILTIAZEM HCL 120 MG
120 CAPSULE, EXT RELEASE 24 HR ORAL ONCE
Refills: 0 | Status: DISCONTINUED | OUTPATIENT
Start: 2022-04-30 | End: 2022-04-30

## 2022-04-30 RX ORDER — DILTIAZEM HCL 120 MG
120 CAPSULE, EXT RELEASE 24 HR ORAL ONCE
Refills: 0 | Status: COMPLETED | OUTPATIENT
Start: 2022-04-30 | End: 2022-04-30

## 2022-04-30 RX ADMIN — FAMOTIDINE 20 MILLIGRAM(S): 10 INJECTION INTRAVENOUS at 11:12

## 2022-04-30 RX ADMIN — POLYETHYLENE GLYCOL 3350 17 GRAM(S): 17 POWDER, FOR SOLUTION ORAL at 05:10

## 2022-04-30 RX ADMIN — Medication 100 MILLIGRAM(S): at 11:08

## 2022-04-30 RX ADMIN — Medication 125 MICROGRAM(S): at 11:12

## 2022-04-30 RX ADMIN — PANTOPRAZOLE SODIUM 40 MILLIGRAM(S): 20 TABLET, DELAYED RELEASE ORAL at 11:13

## 2022-04-30 RX ADMIN — Medication 120 MILLIGRAM(S): at 11:45

## 2022-04-30 NOTE — PROGRESS NOTE ADULT - SUBJECTIVE AND OBJECTIVE BOX
[facility]    Sage Monzon, Isaac, Jessica, Sis, Lonny, Ender      [phone]    CHIEF COMPLAINT: Patient is a 89y old  Female who presents with a chief complaint of Shortness of breath (30 Apr 2022 09:07)      Follow Up: as, af, sob    Interim history: The patient reports no new symptoms.  Denies chest discomfort and shortness of breath.  No abdominal pain.  No new neurologic symptoms.      MEDICATIONS  (STANDING):  digoxin     Tablet 125 MICROGram(s) Oral <User Schedule>  diltiazem    milliGRAM(s) Oral once  diltiazem    milliGRAM(s) Oral daily  famotidine    Tablet 20 milliGRAM(s) Oral daily  metoprolol succinate  milliGRAM(s) Oral daily  pantoprazole  Injectable 40 milliGRAM(s) IV Push daily  polyethylene glycol 3350 17 Gram(s) Oral two times a day  rivaroxaban 15 milliGRAM(s) Oral with dinner  senna 2 Tablet(s) Oral at bedtime    MEDICATIONS  (PRN):  zolpidem 5 milliGRAM(s) Oral at bedtime PRN Insomnia      REVIEW OF SYSTEMS:  eye, ent, GI, , allergic, dermatologic, musculoskeletal and neurologic are negative except as described above    Vital Signs Last 24 Hrs  T(C): 36.3 (30 Apr 2022 11:26), Max: 37.4 (29 Apr 2022 12:59)  T(F): 97.3 (30 Apr 2022 11:26), Max: 99.3 (29 Apr 2022 12:59)  HR: 65 (30 Apr 2022 11:26) (55 - 78)  BP: 103/62 (30 Apr 2022 11:26) (92/56 - 121/65)  BP(mean): --  RR: 18 (30 Apr 2022 11:26) (16 - 18)  SpO2: 98% (30 Apr 2022 11:26) (95% - 98%)    I&O's Summary      Telemetry past 24h: af controlled    PHYSICAL EXAM:    Constitutional: well-nourished, well-developed, NAD   HEENT:  MMM, sclerae anicteric, conjunctivae clear, no oral cyanosis.  Pulmonary: Non-labored, breath sounds are clear bilaterally, No wheezing, rales or rhonchi  Cardiovascular: irregular, S1 and S2.  1-2/6 sys murmur.  No rubs, gallops or clicks  Gastrointestinal: Bowel Sounds present, soft, nontender.   Lymph: No peripheral edema.   Neurological: Alert, no focal deficits  Skin: No rashes.  Psych:  Mood & affect appropriate    LABS: All Labs Reviewed:                        12.2   5.78  )-----------( 201      ( 30 Apr 2022 08:19 )             37.8                         12.5   5.94  )-----------( 192      ( 29 Apr 2022 07:30 )             37.7                         13.5   5.88  )-----------( 206      ( 28 Apr 2022 09:03 )             40.3     30 Apr 2022 08:19    142    |  108    |  33     ----------------------------<  92     3.7     |  26     |  1.10   29 Apr 2022 07:30    140    |  108    |  32     ----------------------------<  98     3.9     |  24     |  1.10   28 Apr 2022 09:03    139    |  108    |  37     ----------------------------<  143    4.1     |  23     |  1.30     Ca    9.4        30 Apr 2022 08:19  Ca    9.1        29 Apr 2022 07:30  Ca    9.6        28 Apr 2022 09:03  Phos  3.9       30 Apr 2022 08:19  Mg     2.4       30 Apr 2022 08:19    TPro  6.3    /  Alb  3.2    /  TBili  0.4    /  DBili  x      /  AST  15     /  ALT  16     /  AlkPhos  68     30 Apr 2022 08:19  TPro  6.3    /  Alb  3.1    /  TBili  0.5    /  DBili  x      /  AST  14     /  ALT  15     /  AlkPhos  71     29 Apr 2022 07:30  TPro  6.6    /  Alb  3.3    /  TBili  0.5    /  DBili  x      /  AST  18     /  ALT  16     /  AlkPhos  69     28 Apr 2022 09:03          Blood Culture:         RADIOLOGY:    EKG:    Echo:

## 2022-04-30 NOTE — PROGRESS NOTE ADULT - SUBJECTIVE AND OBJECTIVE BOX
Patient is a 89y old  Female who presents with a chief complaint of Shortness of breath (29 Apr 2022 11:51)    HPI:  This is a 89 y/o female with PMHx of Afib on Xarelto, CHF, HTN, GERD, osteoarthritis, anxiety, iron deficiency anemia who presents with 3 days of worsening dyspnea on exertion along with chest discomfort. Hx was obtained from pt's daughter, as hx was limited due to patient's dementia. At baseline, pt lives alone with help from an aide, but is able to perform most of her ADLs and cook on her own without difficulty. However, pt has had increasing difficulty with ADLs due to her shortness of breath. Pt also has midline back pain radiating to both arms and some chest discomfort that occurs when the patient is short of breath. She has not had any fevers, chills, lightheadedness, palpitations, abd pain, constipation/diarrhea, dysuria, or pain/swelling in the legs. Pt has no other complaints or concerns at this time, and only current symptom is L-sided back/arm pain.    ED course:  T 95.9, , /72, RR 22, SpO2 100% on 2L NC  Labs significant for BUN/Cr 53/2.00, BNP 9188, TropI 86.9  CXR negative for acute pathology  EKG AFib, rate 86, LAFB, nonspecific ST/T wave abnormality  Given Lasix 20 mg IVP, Cardizem 10 mg IVP in ED   (25 Apr 2022 18:07)    INTERVAL HPI:  4/26/22: Patient was seen and examined at bedside. States she is feeling well and her breathing has improved. Denies any chest pain or palpitations. Transfer planning to [facility].   4/27/22: Patient was seen and examined at bedside. Denies any acute complaints. Transfer to [facility], awaiting bed.   4/28/22: Patient was seen and examined at bedside. States she is feeling well. No acute complaints. Transfer to [facility], awaiting bed.   4/29/22: Patient was seen and examined at bedside. Denies any acute complaints. Asking to go home. D/w daughter- still want TAVR eval. Transfer to [facility], awaiting bed.   4/30/22: Patient seen and examined at bedside. Doing well. No acute complaints. Wants to go home.     OVERNIGHT EVENTS: Patient had intermittent episodes of unsustained bradycardia rate 40-50. Per tele monitor review lowest HR 41. Patient asymptomatic, sleeping at that time. Denies any acute complaints this AM.    Home Medications:  Ambien 10 mg oral tablet: 1 tab(s) orally once a day (at bedtime) (25 Apr 2022 20:01)  famotidine 20 mg oral tablet: 1 tab(s) orally once a day (26 Apr 2022 22:42)  Lasix 40 mg oral tablet: 1 tab(s) orally every other day (25 Apr 2022 20:01)  memantine 28 mg oral capsule, extended release: 1 cap(s) orally once a day (25 Apr 2022 20:01)  olopatadine 0.2% ophthalmic solution: 1 drop(s) to each affected eye once a day, As Needed (25 Apr 2022 20:01)  pantoprazole 20 mg oral delayed release tablet: 1 tab(s) orally once a day (25 Apr 2022 20:01)  rivaroxaban 15 mg oral tablet: 1 tab(s) orally once a day (25 Apr 2022 20:01)  senna oral tablet: 2 tab(s) orally once a day (at bedtime) (28 Apr 2022 13:34)  Toprol- mg oral tablet, extended release: 1 tab(s) orally once a day (25 Apr 2022 20:01)  Vitamin D3 1250 mcg (50,000 intl units) oral capsule: 1 cap(s) orally once a week on Monday  (25 Apr 2022 20:01)      MEDICATIONS  (STANDING):  digoxin     Tablet 125 MICROGram(s) Oral <User Schedule>  diltiazem    milliGRAM(s) Oral daily  famotidine    Tablet 20 milliGRAM(s) Oral daily  metoprolol succinate  milliGRAM(s) Oral daily  pantoprazole  Injectable 40 milliGRAM(s) IV Push daily  polyethylene glycol 3350 17 Gram(s) Oral two times a day  rivaroxaban 15 milliGRAM(s) Oral with dinner  senna 2 Tablet(s) Oral at bedtime    MEDICATIONS  (PRN):  zolpidem 5 milliGRAM(s) Oral at bedtime PRN Insomnia      No Known Allergies      Social History:  Lives at home alone, ambulates with walker, has aide to help with ADLs/IADLs  Denies alcohol, tobacco, recreational drug use  Vaccinated for COVID x 3 (Moderna) (25 Apr 2022 18:07)      REVIEW OF SYSTEMS: i am good, i want to go home   CONSTITUTIONAL: No fever, No chills, No fatigue, No myalgia, No Body ache, No Weakness  EYES: No eye pain,  No visual disturbances, No discharge, NO Redness  ENMT:  No ear pain, No nose bleed, No vertigo; No sinus pain, NO throat pain, No Congestion  NECK: No pain, No stiffness  RESPIRATORY: No cough, NO wheezing, No  hemoptysis, NO  shortness of breath  CARDIOVASCULAR: No chest pain, palpitations  GASTROINTESTINAL: No abdominal pain, NO epigastric pain. No nausea, No vomiting; No diarrhea, No constipation. [x  ] No BM  GENITOURINARY: No dysuria, No frequency, No urgency, No hematuria, NO incontinence  NEUROLOGICAL: No headaches, No dizziness, No numbness, No tingling, No tremors, No weakness  EXT: No Swelling, No Pain, No Edema  SKIN:  [ x ] No itching, burning, rashes, or lesions   MUSCULOSKELETAL: No joint pain ,No Jt swelling; No muscle pain, No back pain, No extremity pain  PSYCHIATRIC: No depression,  No anxiety,  No mood swings ,No difficulty sleeping at night  PAIN SCALE: [ x ] None  [  ] Other-  ROS Unable to obtain due to - [  ] Dementia  [  ] Lethargy [  ] Drowsy [  ] Sedated [  ] non verbal  REST OF REVIEW Of SYSTEM - [ x ] Normal       Vital Signs Last 24 Hrs  T(C): 36.6 (30 Apr 2022 04:47), Max: 37.4 (29 Apr 2022 12:59)  T(F): 97.8 (30 Apr 2022 04:47), Max: 99.3 (29 Apr 2022 12:59)  HR: 58 (30 Apr 2022 04:47) (55 - 74)  BP: 109/60 (30 Apr 2022 04:47) (92/56 - 121/65)  BP(mean): --  RR: 16 (30 Apr 2022 04:47) (16 - 18)  SpO2: 96% (30 Apr 2022 04:47) (95% - 97%)    CAPILLARY BLOOD GLUCOSE          I&O's Summary    PHYSICAL EXAM: i am good, on 2L NC  GENERAL:  [ x ] NAD , [x  ] well appearing, [  ] Agitated, [  ] Drowsy,  [  ] Lethargy, [  ] confused   HEAD:  [ x ] Normal, [  ] Other  EYES:  [ x ] EOMI, [ x ] PERRLA, [x  ] conjunctiva and sclera clear normal, [  ] Other,  [  ] Pallor,[  ] Discharge  ENMT:  [x  ] Normal, [ x ] Moist mucous membranes, [x  ] Good dentition, [ x ] No Thrush  NECK:  [x  ] Supple, [x  ] No JVD, [x  ] Normal thyroid, [  ] Lymphadenopathy [  ] Other  CHEST/LUNG:  [ x ] Clear to auscultation bilaterally, [x  ] Breath Sounds equal B/L / Decrease, [x  ] poor effort  [x  ] No rales, [ x ] No rhonchi  [x  ]  No wheezing,   HEART:  [ x ] Regular rate and rhythm, [  ] tachycardia, [  ] Bradycardia,  [  ] irregular  [x]Systolic murmur present [  ] PPM in place (Mfr:  )  ABDOMEN:  [x  ] Soft, [x  ] Nontender, [  x] Nondistended, [ x ] No mass, [ x ] Bowel sounds present, [  ] obese  NERVOUS SYSTEM:  [x  ] Alert & Oriented X3, [ x ] Nonfocal  [  ] Confusion  [  ] Encephalopathic [  ] Sedated [  ] Unable to assess, [  ] Dementia [  ] Other-  EXTREMITIES: [x  ] 2+ Peripheral Pulses, No clubbing, No cyanosis,  [  ] edema B/L lower EXT. [  ] PVD stasis skin changes B/L Lower EXT, [  ] wound  LYMPH: No lymphadenopathy noted  SKIN:  [x  ] No rashes or lesions, [  ] Pressure Ulcers, [  ] ecchymosis, [  ] Skin Tears, [  ] Other    DIET: Diet, DASH/TLC:   Sodium & Cholesterol Restricted  1000mL Fluid Restriction ([id number]) (04-25-22 @ 17:44)      LABS:                        12.2   5.78  )-----------( 201      ( 30 Apr 2022 08:19 )             37.8     30 Apr 2022 08:19    142    |  108    |  33     ----------------------------<  92     3.7     |  26     |  1.10     Ca    9.4        30 Apr 2022 08:19    TPro  6.3    /  Alb  3.2    /  TBili  0.4    /  DBili  x      /  AST  15     /  ALT  16     /  AlkPhos  68     30 Apr 2022 08:19            CARDIAC MARKERS ( 25 Apr 2022 14:34 )  x     / x     / x     / x     / 3.0 ng/mL         Anemia Panel:      Thyroid Panel:            Serum Pro-Brain Natriuretic Peptide: 9188 pg/mL (04-25-22 @ 14:34)      RADIOLOGY & ADDITIONAL TESTS:      HEALTH ISSUES - PROBLEM Dx:  Chronic atrial fibrillation  Dementia  GERD (gastroesophageal reflux disease)  Allergic conjunctivitis, bilateral  Need for prophylactic measure  Aortic stenosis  BRADY (acute kidney injury)  HTN (hypertension)  Chest pressure  Constipation          Consultant(s) Notes Reviewed:  [ x ] YES     Care Discussed with [ x ] Consultants, [  x] Patient, [  x] Family, [  ] HCP, [  ] , [  ] Social Service, [  ] RN, [  ] Physical Therapy, [  ] Palliative Care Team  DVT PPX: [  ] Lovenox, [  ] SC Heparin, [  ] Coumadin, [ x ] Xarelto, [  ] Eliquis, [  ] Pradaxa, [  ] IV Heparin drip, [  ] SCD, [  ] Ambulation, [  ] Contraindicated 2/2 GI Bleed, [  ] Contraindicated 2/2  Bleed, [  ] Contraindicated 2/2 Brain Bleed  Advanced Directive: [  ] None, [x  ] DNR/DNI Patient is a 89y old  Female who presents with a chief complaint of Shortness of breath (29 Apr 2022 11:51)    HPI:  This is a 89 y/o female with PMHx of Afib on Xarelto, CHF, HTN, GERD, osteoarthritis, anxiety, iron deficiency anemia who presents with 3 days of worsening dyspnea on exertion along with chest discomfort. Hx was obtained from pt's daughter, as hx was limited due to patient's dementia. At baseline, pt lives alone with help from an aide, but is able to perform most of her ADLs and cook on her own without difficulty. However, pt has had increasing difficulty with ADLs due to her shortness of breath. Pt also has midline back pain radiating to both arms and some chest discomfort that occurs when the patient is short of breath. She has not had any fevers, chills, lightheadedness, palpitations, abd pain, constipation/diarrhea, dysuria, or pain/swelling in the legs. Pt has no other complaints or concerns at this time, and only current symptom is L-sided back/arm pain.    ED course:  T 95.9, , /72, RR 22, SpO2 100% on 2L NC  Labs significant for BUN/Cr 53/2.00, BNP 9188, TropI 86.9  CXR negative for acute pathology  EKG AFib, rate 86, LAFB, nonspecific ST/T wave abnormality  Given Lasix 20 mg IVP, Cardizem 10 mg IVP in ED   (25 Apr 2022 18:07)    INTERVAL HPI:  4/26/22: Patient was seen and examined at bedside. States she is feeling well and her breathing has improved. Denies any chest pain or palpitations. Transfer planning to [facility].   4/27/22: Patient was seen and examined at bedside. Denies any acute complaints. Transfer to [facility], awaiting bed.   4/28/22: Patient was seen and examined at bedside. States she is feeling well. No acute complaints. Transfer to [facility], awaiting bed.   4/29/22: Patient was seen and examined at bedside. Denies any acute complaints. Asking to go home. D/w daughter- still want TAVR eval. Transfer to [facility], awaiting bed.   4/30/22: Patient seen and examined at bedside. Doing well. No acute complaints. Wants to go home.     OVERNIGHT EVENTS: Patient had intermittent episodes of unsustained bradycardia rate 40-60. Per tele monitor review lowest HR 41 unsustained. Patient asymptomatic, sleeping at that time. Denies any acute complaints this AM.    Home Medications:  Ambien 10 mg oral tablet: 1 tab(s) orally once a day (at bedtime) (25 Apr 2022 20:01)  famotidine 20 mg oral tablet: 1 tab(s) orally once a day (26 Apr 2022 22:42)  Lasix 40 mg oral tablet: 1 tab(s) orally every other day (25 Apr 2022 20:01)  memantine 28 mg oral capsule, extended release: 1 cap(s) orally once a day (25 Apr 2022 20:01)  olopatadine 0.2% ophthalmic solution: 1 drop(s) to each affected eye once a day, As Needed (25 Apr 2022 20:01)  pantoprazole 20 mg oral delayed release tablet: 1 tab(s) orally once a day (25 Apr 2022 20:01)  rivaroxaban 15 mg oral tablet: 1 tab(s) orally once a day (25 Apr 2022 20:01)  senna oral tablet: 2 tab(s) orally once a day (at bedtime) (28 Apr 2022 13:34)  Toprol- mg oral tablet, extended release: 1 tab(s) orally once a day (25 Apr 2022 20:01)  Vitamin D3 1250 mcg (50,000 intl units) oral capsule: 1 cap(s) orally once a week on Monday  (25 Apr 2022 20:01)      MEDICATIONS  (STANDING):  digoxin     Tablet 125 MICROGram(s) Oral <User Schedule>  diltiazem    milliGRAM(s) Oral daily  famotidine    Tablet 20 milliGRAM(s) Oral daily  metoprolol succinate  milliGRAM(s) Oral daily  pantoprazole  Injectable 40 milliGRAM(s) IV Push daily  polyethylene glycol 3350 17 Gram(s) Oral two times a day  rivaroxaban 15 milliGRAM(s) Oral with dinner  senna 2 Tablet(s) Oral at bedtime    MEDICATIONS  (PRN):  zolpidem 5 milliGRAM(s) Oral at bedtime PRN Insomnia      No Known Allergies      Social History:  Lives at home alone, ambulates with walker, has aide to help with ADLs/IADLs  Denies alcohol, tobacco, recreational drug use  Vaccinated for COVID x 3 (Moderna) (25 Apr 2022 18:07)      REVIEW OF SYSTEMS: i am good, i want to go home   CONSTITUTIONAL: No fever, No chills, No fatigue, No myalgia, No Body ache, No Weakness  EYES: No eye pain,  No visual disturbances, No discharge, NO Redness  ENMT:  No ear pain, No nose bleed, No vertigo; No sinus pain, NO throat pain, No Congestion  NECK: No pain, No stiffness  RESPIRATORY: No cough, NO wheezing, No  hemoptysis, NO  shortness of breath  CARDIOVASCULAR: No chest pain, palpitations  GASTROINTESTINAL: No abdominal pain, NO epigastric pain. No nausea, No vomiting; No diarrhea, No constipation. [x  ] No BM  GENITOURINARY: No dysuria, No frequency, No urgency, No hematuria, NO incontinence  NEUROLOGICAL: No headaches, No dizziness, No numbness, No tingling, No tremors, No weakness  EXT: No Swelling, No Pain, No Edema  SKIN:  [ x ] No itching, burning, rashes, or lesions   MUSCULOSKELETAL: No joint pain ,No Jt swelling; No muscle pain, No back pain, No extremity pain  PSYCHIATRIC: No depression,  No anxiety,  No mood swings ,No difficulty sleeping at night  PAIN SCALE: [ x ] None  [  ] Other-  ROS Unable to obtain due to - [  ] Dementia  [  ] Lethargy [  ] Drowsy [  ] Sedated [  ] non verbal  REST OF REVIEW Of SYSTEM - [ x ] Normal       Vital Signs Last 24 Hrs  T(C): 36.6 (30 Apr 2022 04:47), Max: 37.4 (29 Apr 2022 12:59)  T(F): 97.8 (30 Apr 2022 04:47), Max: 99.3 (29 Apr 2022 12:59)  HR: 58 (30 Apr 2022 04:47) (55 - 74)  BP: 109/60 (30 Apr 2022 04:47) (92/56 - 121/65)  BP(mean): --  RR: 16 (30 Apr 2022 04:47) (16 - 18)  SpO2: 96% (30 Apr 2022 04:47) (95% - 97%)    CAPILLARY BLOOD GLUCOSE          I&O's Summary    PHYSICAL EXAM: i am good, on 2L NC  GENERAL:  [ x ] NAD , [x  ] well appearing, [  ] Agitated, [  ] Drowsy,  [  ] Lethargy, [  ] confused   HEAD:  [ x ] Normal, [  ] Other  EYES:  [ x ] EOMI, [ x ] PERRLA, [x  ] conjunctiva and sclera clear normal, [  ] Other,  [  ] Pallor,[  ] Discharge  ENMT:  [x  ] Normal, [ x ] Moist mucous membranes, [x  ] Good dentition, [ x ] No Thrush  NECK:  [x  ] Supple, [x  ] No JVD, [x  ] Normal thyroid, [  ] Lymphadenopathy [  ] Other  CHEST/LUNG:  [ x ] Clear to auscultation bilaterally, [x  ] Breath Sounds equal B/L / Decrease, [x  ] poor effort  [x  ] No rales, [ x ] No rhonchi  [x  ]  No wheezing,   HEART:  [ x ] Regular rate and rhythm, [  ] tachycardia, [  ] Bradycardia,  [  ] irregular  [x]Systolic murmur present [  ] PPM in place (Mfr:  )  ABDOMEN:  [x  ] Soft, [x  ] Nontender, [  x] Nondistended, [ x ] No mass, [ x ] Bowel sounds present, [  ] obese  NERVOUS SYSTEM:  [x  ] Alert & Oriented X3, [ x ] Nonfocal  [  ] Confusion  [  ] Encephalopathic [  ] Sedated [  ] Unable to assess, [  ] Dementia [  ] Other-  EXTREMITIES: [x  ] 2+ Peripheral Pulses, No clubbing, No cyanosis,  [  ] edema B/L lower EXT. [  ] PVD stasis skin changes B/L Lower EXT, [  ] wound  LYMPH: No lymphadenopathy noted  SKIN:  [x  ] No rashes or lesions, [  ] Pressure Ulcers, [  ] ecchymosis, [  ] Skin Tears, [  ] Other    DIET: Diet, DASH/TLC:   Sodium & Cholesterol Restricted  1000mL Fluid Restriction ([id number]) (04-25-22 @ 17:44)      LABS:                        12.2   5.78  )-----------( 201      ( 30 Apr 2022 08:19 )             37.8     30 Apr 2022 08:19    142    |  108    |  33     ----------------------------<  92     3.7     |  26     |  1.10     Ca    9.4        30 Apr 2022 08:19    TPro  6.3    /  Alb  3.2    /  TBili  0.4    /  DBili  x      /  AST  15     /  ALT  16     /  AlkPhos  68     30 Apr 2022 08:19            CARDIAC MARKERS ( 25 Apr 2022 14:34 )  x     / x     / x     / x     / 3.0 ng/mL         Anemia Panel:      Thyroid Panel:            Serum Pro-Brain Natriuretic Peptide: 9188 pg/mL (04-25-22 @ 14:34)      RADIOLOGY & ADDITIONAL TESTS:      HEALTH ISSUES - PROBLEM Dx:  Chronic atrial fibrillation  Dementia  GERD (gastroesophageal reflux disease)  Allergic conjunctivitis, bilateral  Need for prophylactic measure  Aortic stenosis  BRADY (acute kidney injury)  HTN (hypertension)  Chest pressure  Constipation          Consultant(s) Notes Reviewed:  [ x ] YES     Care Discussed with [ x ] Consultants, [  x] Patient, [  x] Family, [  ] HCP, [  ] , [  ] Social Service, [  ] RN, [  ] Physical Therapy, [  ] Palliative Care Team  DVT PPX: [  ] Lovenox, [  ] SC Heparin, [  ] Coumadin, [ x ] Xarelto, [  ] Eliquis, [  ] Pradaxa, [  ] IV Heparin drip, [  ] SCD, [  ] Ambulation, [  ] Contraindicated 2/2 GI Bleed, [  ] Contraindicated 2/2  Bleed, [  ] Contraindicated 2/2 Brain Bleed  Advanced Directive: [  ] None, [x  ] DNR/DNI Patient is a 89y old  Female who presents with a chief complaint of Shortness of breath (29 Apr 2022 11:51)    HPI:  This is a 89 y/o female with PMHx of Afib on Xarelto, CHF, HTN, GERD, osteoarthritis, anxiety, iron deficiency anemia who presents with 3 days of worsening dyspnea on exertion along with chest discomfort. Hx was obtained from pt's daughter, as hx was limited due to patient's dementia. At baseline, pt lives alone with help from an aide, but is able to perform most of her ADLs and cook on her own without difficulty. However, pt has had increasing difficulty with ADLs due to her shortness of breath. Pt also has midline back pain radiating to both arms and some chest discomfort that occurs when the patient is short of breath. She has not had any fevers, chills, lightheadedness, palpitations, abd pain, constipation/diarrhea, dysuria, or pain/swelling in the legs. Pt has no other complaints or concerns at this time, and only current symptom is L-sided back/arm pain.    ED course:  T 95.9, , /72, RR 22, SpO2 100% on 2L NC  Labs significant for BUN/Cr 53/2.00, BNP 9188, TropI 86.9  CXR negative for acute pathology  EKG AFib, rate 86, LAFB, nonspecific ST/T wave abnormality  Given Lasix 20 mg IVP, Cardizem 10 mg IVP in ED   (25 Apr 2022 18:07)    INTERVAL HPI:  4/26/22: Patient was seen and examined at bedside. States she is feeling well and her breathing has improved. Denies any chest pain or palpitations. Transfer planning to [facility].   4/27/22: Patient was seen and examined at bedside. Denies any acute complaints. Transfer to [facility], awaiting bed.   4/28/22: Patient was seen and examined at bedside. States she is feeling well. No acute complaints. Transfer to [facility], awaiting bed.   4/29/22: Patient was seen and examined at bedside. Denies any acute complaints. Asking to go home. D/w daughter- still want TAVR eval. Transfer to [facility], awaiting bed.   4/30/22: Patient seen and examined at bedside. Doing well. No acute complaints. Wants to go home.     OVERNIGHT EVENTS: Patient had intermittent episodes of unsustained bradycardia rate 40-60. Per tele monitor review lowest HR 41 unsustained. Patient asymptomatic, sleeping at that time. Denies any acute complaints this AM.    Home Medications:  Ambien 10 mg oral tablet: 1 tab(s) orally once a day (at bedtime) (25 Apr 2022 20:01)  famotidine 20 mg oral tablet: 1 tab(s) orally once a day (26 Apr 2022 22:42)  Lasix 40 mg oral tablet: 1 tab(s) orally every other day (25 Apr 2022 20:01)  memantine 28 mg oral capsule, extended release: 1 cap(s) orally once a day (25 Apr 2022 20:01)  olopatadine 0.2% ophthalmic solution: 1 drop(s) to each affected eye once a day, As Needed (25 Apr 2022 20:01)  pantoprazole 20 mg oral delayed release tablet: 1 tab(s) orally once a day (25 Apr 2022 20:01)  rivaroxaban 15 mg oral tablet: 1 tab(s) orally once a day (25 Apr 2022 20:01)  senna oral tablet: 2 tab(s) orally once a day (at bedtime) (28 Apr 2022 13:34)  Toprol- mg oral tablet, extended release: 1 tab(s) orally once a day (25 Apr 2022 20:01)  Vitamin D3 1250 mcg (50,000 intl units) oral capsule: 1 cap(s) orally once a week on Monday  (25 Apr 2022 20:01)      MEDICATIONS  (STANDING):  digoxin     Tablet 125 MICROGram(s) Oral <User Schedule>  diltiazem    milliGRAM(s) Oral daily  famotidine    Tablet 20 milliGRAM(s) Oral daily  metoprolol succinate  milliGRAM(s) Oral daily  pantoprazole  Injectable 40 milliGRAM(s) IV Push daily  polyethylene glycol 3350 17 Gram(s) Oral two times a day  rivaroxaban 15 milliGRAM(s) Oral with dinner  senna 2 Tablet(s) Oral at bedtime    MEDICATIONS  (PRN):  zolpidem 5 milliGRAM(s) Oral at bedtime PRN Insomnia      No Known Allergies      Social History:  Lives at home alone, ambulates with walker, has aide to help with ADLs/IADLs  Denies alcohol, tobacco, recreational drug use  Vaccinated for COVID x 3 (Moderna) (25 Apr 2022 18:07)      REVIEW OF SYSTEMS: i am good, i want to go home   CONSTITUTIONAL: No fever, No chills, No fatigue, No myalgia, No Body ache, No Weakness  EYES: No eye pain,  No visual disturbances, No discharge, NO Redness  ENMT:  No ear pain, No nose bleed, No vertigo; No sinus pain, NO throat pain, No Congestion  NECK: No pain, No stiffness  RESPIRATORY: No cough, NO wheezing, No  hemoptysis, NO  shortness of breath  CARDIOVASCULAR: No chest pain, palpitations  GASTROINTESTINAL: No abdominal pain, NO epigastric pain. No nausea, No vomiting; No diarrhea, No constipation. [x  ] No BM  GENITOURINARY: No dysuria, No frequency, No urgency, No hematuria, NO incontinence  NEUROLOGICAL: No headaches, No dizziness, No numbness, No tingling, No tremors, No weakness  EXT: No Swelling, No Pain, No Edema  SKIN:  [ x ] No itching, burning, rashes, or lesions   MUSCULOSKELETAL: No joint pain ,No Jt swelling; No muscle pain, No back pain, No extremity pain  PSYCHIATRIC: No depression,  No anxiety,  No mood swings ,No difficulty sleeping at night  PAIN SCALE: [ x ] None  [  ] Other-  ROS Unable to obtain due to - [  ] Dementia  [  ] Lethargy [  ] Drowsy [  ] Sedated [  ] non verbal  REST OF REVIEW Of SYSTEM - [ x ] Normal       Vital Signs Last 24 Hrs  T(C): 36.6 (30 Apr 2022 04:47), Max: 37.4 (29 Apr 2022 12:59)  T(F): 97.8 (30 Apr 2022 04:47), Max: 99.3 (29 Apr 2022 12:59)  HR: 58 (30 Apr 2022 04:47) (55 - 74)  BP: 109/60 (30 Apr 2022 04:47) (92/56 - 121/65)  BP(mean): --  RR: 16 (30 Apr 2022 04:47) (16 - 18)  SpO2: 96% (30 Apr 2022 04:47) (95% - 97%)    CAPILLARY BLOOD GLUCOSE          I&O's Summary    PHYSICAL EXAM: i am good, on 2L NC  GENERAL:  [ x ] NAD , [x  ] well appearing, [  ] Agitated, [  ] Drowsy,  [  ] Lethargy, [  ] confused   HEAD:  [ x ] Normal, [  ] Other  EYES:  [ x ] EOMI, [ x ] PERRLA, [x  ] conjunctiva and sclera clear normal, [  ] Other,  [  ] Pallor,[  ] Discharge  ENMT:  [x  ] Normal, [ x ] Moist mucous membranes, [x  ] Good dentition, [ x ] No Thrush  NECK:  [x  ] Supple, [x  ] No JVD, [x  ] Normal thyroid, [  ] Lymphadenopathy [  ] Other  CHEST/LUNG:  [ x ] Clear to auscultation bilaterally, [x  ] Breath Sounds equal B/L / Decrease, [x  ] poor effort  [x  ] No rales, [ x ] No rhonchi  [x  ]  No wheezing,   HEART:  [ x ] Regular rate and rhythm, [  ] tachycardia, [  ] Bradycardia,  [  ] irregular  [x]Systolic murmur present [  ] PPM in place (Mfr:  )  ABDOMEN:  [x  ] Soft, [x  ] Nontender, [  x] Nondistended, [ x ] No mass, [ x ] Bowel sounds present, [  ] obese  NERVOUS SYSTEM:  [x  ] Alert & Oriented X3, [ x ] Nonfocal  [  ] Confusion  [  ] Encephalopathic [  ] Sedated [  ] Unable to assess, [  ] Dementia [  ] Other-  EXTREMITIES: [x  ] 2+ Peripheral Pulses, No clubbing, No cyanosis,  [  ] edema B/L lower EXT. [  ] PVD stasis skin changes B/L Lower EXT, [  ] wound  LYMPH: No lymphadenopathy noted  SKIN:  [x  ] No rashes or lesions, [  ] Pressure Ulcers, [  ] ecchymosis, [  ] Skin Tears, [  ] Other    DIET: Diet, DASH/TLC:   Sodium & Cholesterol Restricted  1000mL Fluid Restriction ([id number]) (04-25-22 @ 17:44)      LABS:                        12.2   5.78  )-----------( 201      ( 30 Apr 2022 08:19 )             37.8     30 Apr 2022 08:19    142    |  108    |  33     ----------------------------<  92     3.7     |  26     |  1.10     Ca    9.4        30 Apr 2022 08:19    TPro  6.3    /  Alb  3.2    /  TBili  0.4    /  DBili  x      /  AST  15     /  ALT  16     /  AlkPhos  68     30 Apr 2022 08:19      CARDIAC MARKERS ( 25 Apr 2022 14:34 )  x     / x     / x     / x     / 3.0 ng/mL      Serum Pro-Brain Natriuretic Peptide: 9188 pg/mL (04-25-22 @ 14:34)      RADIOLOGY & ADDITIONAL TESTS: NONE      HEALTH ISSUES - PROBLEM Dx:  Chronic atrial fibrillation  Dementia  GERD (gastroesophageal reflux disease)  Allergic conjunctivitis, bilateral  Need for prophylactic measure  Aortic stenosis  BRADY (acute kidney injury)  HTN (hypertension)  Chest pressure  Constipation          Consultant(s) Notes Reviewed:  [ x ] YES     Care Discussed with [ x ] Consultants, [  x] Patient, [  x] Family, [  ] HCP, [ x ] , [  ] Social Service, [ x ] RN, [  ] Physical Therapy, [  ] Palliative Care Team  DVT PPX: [  ] Lovenox, [  ] SC Heparin, [  ] Coumadin, [ x ] Xarelto, [  ] Eliquis, [  ] Pradaxa, [  ] IV Heparin drip, [  ] SCD, [  ] Ambulation, [  ] Contraindicated 2/2 GI Bleed, [  ] Contraindicated 2/2  Bleed, [  ] Contraindicated 2/2 Brain Bleed  Advanced Directive: [  ] None, [x  ] DNR/DNI

## 2022-04-30 NOTE — PROGRESS NOTE ADULT - PROBLEM SELECTOR PLAN 8
on Senna Q HS  Miralax 2x day.  1 dose Linzess  po as per Dr Sheikh LEÓN on Senna Q HS  Miralax 2x day.  offered 1 dose Linzess  po as per Dr Sheikh LEÓN however patient refused on Senna Q HS  Miralax 2x day.  offered 1 dose Linzess  po as per Dr Sheikh LEÓN however patient refused daily meds, wants prescription.  Linzess 290 mcg 1 tab daily

## 2022-04-30 NOTE — PROGRESS NOTE ADULT - PROBLEM SELECTOR PLAN 5
Continue Toprol  mg, Cardizem 120 mg daily,  - Lasix 40 mg QOD on HOLD as per cardiology  - Hold spironolactone 50 mg daily for now to avoid overdiuresis, can resume in AM if needed Continue Toprol  mg, Cardizem 120 mg daily,  - Lasix 40 mg QOD on HOLD as per cardiology  - STOP spironolactone 50 mg daily for now to avoid overdiuresis, can resume in AM if needed

## 2022-04-30 NOTE — PROGRESS NOTE ADULT - PROBLEM SELECTOR PLAN 6
Continue home memantine 28 mg daily- family to bring from home Chronic Diastolic CHF -stable  Restart Lasix 40 mg 1 tab every other day  Dig 1 tab Q OD  STOP Spironolactone.

## 2022-04-30 NOTE — DISCHARGE NOTE NURSING/CASE MANAGEMENT/SOCIAL WORK - PATIENT PORTAL LINK FT
You can access the FollowMyHealth Patient Portal offered by [facility] by registering at the following website: [email or web address]. By joining [email or web address] FollowMyHealth portal, you will also be able to view your health information using other applications (apps) compatible with our system.

## 2022-04-30 NOTE — PROGRESS NOTE ADULT - ASSESSMENT
89 female  PMH Afib on Xarelto, CHF, HTN, GERD, osteoarthritis, anxiety, iron deficiency anemia presents to ER with daughter c/o chest pain, left sided, radiating to left arm, shortness of breath, worse with exertion for the past few days and generalized weakness,     A fib, sever AS, HTN, HLD   - p/w YOUNG, chest tightness and arm pain,   - Echocardiogram 03/2022 demonstrates an NIELS of 0.9 sq cm, a DVI of 0.27, peak and mean gradients of 61 and 29 mmHg, and an AoV of 3.9 m/s; as per our review with Dr Aye Mckeon, these are consistent with moderate to severe AS., mild MR, EF= 65-70%, mod conc LVH, mod TR, borderline pulm HTN  - Pt initially declined TAVR, contacted [facility] and is now accepted for transfer to [facility] for structural heart evaluation (accepting physician Dr. Haynes) for possible BAV vs TAVR, pending bed availability  -given difficulty in finding a bed it now has been determined that we can prob defer transfer for tavr and send her home to reconsider this more carefully  - Serum Pro-Brain Natriuretic Peptide: 9188   - CXR clear   - no signs of volume overload, s/p Lasix, Assess volume status daily  - Strict intake and output    - Troponin down trending, elevated likely to reduced renal clearance from BRADY, no need to trend further   - EKG showed A fib   - No signs of acute ischemia      - telemetry: A fib 40-60's with non sustained RVR 's   - Continue CCB, BB, AC  - Would like to keep HR close to 60s given severe AS     - Monitor and replete Lytes. Keep K > 4 and Mg > 2  - All other medical needs and dc planning as per primary team.  - Other cardiovascular workup will depend on clinical course.  - Will continue to follow.

## 2022-04-30 NOTE — PROGRESS NOTE ADULT - PROBLEM SELECTOR PLAN 4
Chronic, EKG showing rate-controlled AFib, patient bradycardic overnight  - Continue digoxin 125 mcg every other day, Dig level 1.3 on admission  - Continue Toprol  mg daily, Cardizem 120 mg daily with hold parameters  -Patient missed AM Toprol, Cardizem, Digoxin this AM given bradycardia, adjust meds pending cardio recs  - Continue Xarelto 15 mg daily Chronic, EKG showing rate-controlled AFib, patient bradycardic overnight although unsustained, OK to give meds  - Continue digoxin 125 mcg every other day, Dig level 1.3 on admission  - Continue Toprol  mg daily, Cardizem 120 mg daily with hold parameters  - Continue Xarelto 15 mg daily Chronic, EKG showing rate-controlled AFib, patient bradycardic overnight although unsustained, OK to give meds  - Continue digoxin 125 mcg every other day, Dig level 1.3 on admission  - Continue Toprol  mg daily, Cardizem CD  120 mg daily with hold parameters  - Continue Xarelto 15 mg daily

## 2022-04-30 NOTE — PROGRESS NOTE ADULT - PROBLEM SELECTOR PLAN 3
BRADY/CKD 2- Cr on admission 2.0, baseline appears to be around 1.0- RESOLVED  - Likely secondary to decreased effective circulating volume due to aortic stenosis vs overdiuresis  - Lasix 40 mg every other day upon discharge  - Avoid IVF at this time due to AS  - Avoid nephrotoxic agents and IV contrast agents if possible BRADY/CKD 2- Cr on admission 2.0, baseline appears to be around 1.0- RESOLVED  - Likely secondary to decreased effective circulating volume due to aortic stenosis vs overdiuresis  - Lasix 40 mg every other day upon discharge  - Avoid IVF at this time due to AS  - Avoid nephrotoxic agents   -Cr stable.

## 2022-04-30 NOTE — DISCHARGE NOTE NURSING/CASE MANAGEMENT/SOCIAL WORK - NSDCVIVACCINE_GEN_ALL_CORE_FT
influenza, high-dose, quadrivalent; 22-Nov-2021 12:39; Araceli Bernal (YUMIKO); [employer]; [id number] (Exp. Date: 30-Jun-2022); IntraMuscular; Deltoid Left.; 0.7 milliLiter(s); VIS (VIS Published: 06-Aug-2021, VIS Presented: 22-Nov-2021);

## 2022-04-30 NOTE — PROGRESS NOTE ADULT - ATTENDING COMMENTS
89 female  PMH Afib on Xarelto, CHF, HTN, GERD, osteoarthritis, anxiety, iron deficiency anemia presents to ER with daughter c/o chest pain, left sided, radiating to left arm, shortness of breath, worse with exertion for the past few days and generalized weakness.   Pt seen, examined, case & care plan d/w pt, residents at detail.  D/W Cardiologist -DR Lopez  at detail. will restart Lasix 40 mg every other day on D/C   -NO IVF ,   PO diet  D/W DR Granados & Dtr at detail,   PT---> HCPT, pt & Family DOES NOT want HCPT on d/c   Pt & Family wants  pt to go home today   -D/C Home today , D/W case management at detail , asper Dtr pt's HHA has been resumed , pt does NOT want HCPT  Total  d/c care time is 60 minutes. 89 female  PMH Afib on Xarelto, CHF, HTN, GERD, osteoarthritis, anxiety, iron deficiency anemia presents to ER with daughter c/o chest pain, left sided, radiating to left arm, shortness of breath, worse with exertion for the past few days and generalized weakness.   Pt seen, examined, case & care plan d/w pt, residents at detail.  D/W Cardiologist -DR Lopez  at detail. will restart Lasix 40 mg every other day on D/C   -NO IVF ,   PO diet  D/W DR Granados & Dtr at detail,   PT---> HCPT, pt & Family DOES NOT want HCPT on d/c , as per family pt takes K Cl liquid 20 meq with Lasix   Pt & Family wants  pt to go home today   -D/C Home today , D/W case management at detail , asper Dtr pt's HHA has been resumed , pt does NOT want HCPT  Total  d/c care time is 60 minutes.

## 2022-04-30 NOTE — PROGRESS NOTE ADULT - PROBLEM SELECTOR PLAN 1
Chest Pressure/ YOUNG likely 2/2 Severe AS.-RESOLVED  -pt had Recent ECHO March 2022  + Troponin peaked ,likely 2/2 Demand ischemia, also 2/2 BRADY with decrease clearance.  -continue Toprol  mg daily pending cardio recs given bradycardia  -Cardiology-Dr Gray  follow up   -Initially awaiting transfer to [facility] for TAVR eval, however now likely d/c home with HCPT per family/patient wishesas patient a poor surgical candidate given risk of ARF Chest Pressure/ YOUNG likely 2/2 Severe AS.-RESOLVED  -pt had Recent ECHO March 2022  + Troponin peaked ,likely 2/2 Demand ischemia, also 2/2 BRADY with decrease clearance.  -continue Toprol  mg daily per cardio as bradycardia unsustained, asymptomatic  -Cardiology-Dr Gray  follow up   -Initially awaiting transfer to [facility] for TAVR eval, however now likely d/c home with HCPT per family/patient wishesas patient a poor surgical candidate given risk of ARF Chest Pressure/ YOUNG likely 2/2 Severe AS.-RESOLVED  -pt had Recent ECHO March 2022  + Troponin peaked ,likely 2/2 Demand ischemia, also 2/2 BRADY with decrease clearance.  -continue Toprol  mg daily per cardio as bradycardia unsustained, asymptomatic  -Cardiology-Dr Gray  follow up   -Initially awaiting transfer to [facility] for TAVR eval, however now likely d/c home with HCPT per family/patient wishes as patient a poor surgical candidate given risk of ARF Chest Pressure/ YOUNG likely 2/2 Severe AS.-RESOLVED  -pt had Recent ECHO March 2022  + Troponin peaked ,likely 2/2 Demand ischemia, also 2/2 BRADY with decrease clearance.  -continue Toprol  mg daily per cardio as bradycardia unsustained, asymptomatic  -Cardiology-Dr Lopez  -stable for d/c   -Initially awaiting transfer to [facility] for TAVR eval, however now likely d/c home with HCPT per family/patient wishes as patient a poor surgical candidate given risk of ARF & age  as per cardiology

## 2022-04-30 NOTE — DISCHARGE NOTE NURSING/CASE MANAGEMENT/SOCIAL WORK - NSDCPEFALRISK_GEN_ALL_CORE
For information on Fall & Injury Prevention, visit: [email or web address] OR  [email or web address] OR  https://www.cdc.gov/steadi/patient.html

## 2022-04-30 NOTE — PROGRESS NOTE ADULT - PROBLEM SELECTOR PLAN 2
Pt presenting with increased dyspnea on exertion, chest discomfort for 3 days, RESOLVED  - Prior echo from March 2022 showing LVEF of 65-70%, moderate to severe aortic stenosis  - Likely 2/2 symptomatic aortic stenosis  - Decrease Lasix to 40 mg every other day upon discharge  - Strict Is/Os, daily weights, fluid restriction to 1 L daily.  - on Toprol  mg daily, possible decrease pending cardio recs  - Cardiology Dr. Alegre consulted in ED, pt accepted for transfer to [facility] for structural heart evaluation (accepting physician Dr. Haynes)  - As per cardio, pt is poor candidate for TAVR but may benefit from repeat structural heart evaluation and possible BAV  -Now likely d/c home with HCPT per family/patient wishes as patient a poor surgical candidate given risk of ARF Pt presenting with increased dyspnea on exertion, chest discomfort for 3 days, RESOLVED  - Prior echo from March 2022 showing LVEF of 65-70%, moderate to severe aortic stenosis  - Likely 2/2 symptomatic aortic stenosis  - Decrease Lasix to 40 mg every other day upon discharge  - Strict Is/Os, daily weights, fluid restriction to 1 L daily.  - on Toprol  mg daily  - Cardiology Dr. Alegre consulted in ED, pt accepted for transfer to [facility] for structural heart evaluation (accepting physician Dr. Haynes)  - As per cardio, pt is poor candidate for TAVR but may benefit from repeat structural heart evaluation and possible BAV  -Now likely d/c home with HCPT per family/patient wishes as patient a poor surgical candidate given risk of ARF Pt presenting with increased dyspnea on exertion, chest discomfort for 3 days, RESOLVED  - Prior echo from March 2022 showing LVEF of 65-70%, moderate to severe aortic stenosis  - Likely 2/2 symptomatic aortic stenosis  - Decrease Lasix to 40 mg every other day upon discharge  - Strict Is/Os, daily weights, fluid restriction to 1 L daily.  - on Toprol  mg daily  - Cardiology Dr. Alegre consulted in ED, pt  was initially accepted for transfer to [facility] for structural heart evaluation (accepting physician Dr. Haynes)  - As per cardio, pt is poor candidate for TAVR but may benefit from repeat structural heart evaluation and possible BAV  -Now likely d/c home with HCPT per family/patient wishes as patient a poor surgical candidate given risk of ARF

## 2022-05-16 NOTE — PATIENT PROFILE ADULT - HEALTH LITERACY
[facility]  Discharge Note    Pt discharged with followings belongings:   Dental Appliances: None  Vision - Corrective Lenses: None  Hearing Aid: None  Jewelry: None  Body Piercings Removed: No  Clothing: Shirt,Socks,Pants,Footwear  Other Valuables: Other (Comment) (cell phone in safe)   Valuables sent home with patient or returned to patient. Patient education on aftercare instructions: yes. Information faxed to N/A by this writer  at 2:18 PM .Patient verbalize understanding of AVS:  yes. Status EXAM upon discharge:  Mental Status and Behavioral Exam  Normal: No  Level of Assistance: Independent/Self  Facial Expression: Flat  Affect: Appropriate  Level of Consciousness: Alert  Frequency of Checks: 4 times per hour, close  Mood:Normal: No  Mood: Depressed  Motor Activity:Normal: Yes  Eye Contact: Good  Observed Behavior: Cooperative  Sexual Misconduct History: Current - no  Preception: [place] to time,[place] to place,[place] to person,[place] no

## 2022-06-21 NOTE — PROGRESS NOTE ADULT - PROBLEM SELECTOR PROBLEM 2
Atrial fibrillation
Atrial fibrillation
abdominal pain
Atrial fibrillation

## 2022-08-29 ENCOUNTER — NON-APPOINTMENT (OUTPATIENT)
Age: 87
End: 2022-08-29

## 2022-09-06 ENCOUNTER — APPOINTMENT (OUTPATIENT)
Dept: CARDIOTHORACIC SURGERY | Facility: CLINIC | Age: 87
End: 2022-09-06

## 2022-09-26 ENCOUNTER — RX RENEWAL (OUTPATIENT)
Age: 87
End: 2022-09-26

## 2023-03-03 ENCOUNTER — RX RENEWAL (OUTPATIENT)
Age: 88
End: 2023-03-03

## 2023-04-03 ENCOUNTER — RX RENEWAL (OUTPATIENT)
Age: 88
End: 2023-04-03

## 2023-06-07 NOTE — DATA REVIEWED
[FreeTextEntry1] : Echo: 12/7/21\par EF 55-60%, NIELS 0.6 sqcm, MGr 30 mmHg, pGr 50 mmHg, AoV 3.4 m/s\par  \par  Abdominal Pain    Many things can cause abdominal pain. Many times, abdominal pain is not caused by a disease and will improve without treatment. Your health care provider will do a physical exam to determine if there is a dangerous cause of your pain; blood tests and imaging may help determine the cause of your pain. However, in many cases, no cause may be found and you may need further testing as an outpatient. Monitor your abdominal pain for any changes.     SEEK IMMEDIATE MEDICAL CARE IF YOU HAVE ANY OF THE FOLLOWING SYMPTOMS: worsening abdominal pain, uncontrollable vomiting, profuse diarrhea, inability to have bowel movements or pass gas, black or bloody stools, fever accompanying chest pain or back pain, or fainting. These symptoms may represent a serious problem that is an emergency. Do not wait to see if the symptoms will go away. Get medical help right away. Call 911 and do not drive yourself to the hospital.    Please take Oxybutynin every 8 hours first day.  Please take Oxybutynin twice a day second day.  Please take Oxybutynin once third day

## 2024-01-15 NOTE — ED ADULT TRIAGE NOTE - ISOLATION TYPE:
mass.  Recommend US-guided biopsy.           9/21/23 Right breast US-guided core biopsy  DIAGNOSIS   A:  \" RIGHT BREAST, 1:00 POSITION, 8 CM FROM NIPPLE, CORE BIOPSY\":   INFILTRATING DUCTAL CARCINOMA WITH LOBULAR FEATURES, LOW-GRADE (WELL DIFFERENTIATED).   DEFINITE IN SITU COMPONENT AND LYMPHOVASCULAR INVASION ARE NOT IDENTIFIED   Sign Out Date: 9/22/2023  NIKOLAI Phelps Jr., M.D.   ER: Positive (93%); NH: Positive (55%); HER-2/ILA: Negative/Low (1+)             Preliminary US evaluation of the right axilla demonstrated no pathologically enlarged or dysmorphic nodes.  Post-bx mammo demonstrates the biopsy marker clip in expected position.         9/29/23 Breast MRI  A skin marker has been placed over the upper inner posterior to mid soft tissues of the right breast indicating the biopsy site showing right breast IDC.  The breasts are composed of scattered fibroglandular elements. No enlarged axillary lymph nodes are seen.   No enlarged internal mammary lymph nodes are seen. Evaluation of the lungs is limited by  MRI imaging.   However, no obvious pulmonary masses are seen. No significant pleural effusions are seen.   The liver is obscured by cardiac motion artifact and only partially visualized.   Only a 1.4 cm benign nonenhancing cyst is seen in the right lobe of the liver on axial T2-weighted image 13.     Following the administration of intravenous contrast, normal enhancement is seen of the heart and vascular structures of the breasts.   Only minimal background physiologic enhancement is seen in the bilateral breasts.     Right breast assessment is minimally limited by patient motion. In particular, this limits subtraction postcontrast assessment.   Underlying the right breast skin marker, there is a small residual enhancing mass seen on axial postcontrast subtracted image 114 measuring 0.3 cm in size.   Additional mild, vague enhancement is seen at this level and posterior to this level which is favored to 
None

## 2025-02-25 NOTE — PHYSICAL THERAPY INITIAL EVALUATION ADULT - ORIENTATION, REHAB EVAL
oriented to person, place, time and situation
Bed in lowest position, wheels locked, appropriate side rails in place/Call bell, personal items and telephone in reach/Instruct patient to call for assistance before getting out of bed or chair/Non-slip footwear when patient is out of bed/[place] to call system/Physically safe environment - no spills, clutter or unnecessary equipment/Purposeful Proactive Rounding/Room/bathroom lighting operational, light cord in reach